# Patient Record
Sex: MALE | Race: WHITE | NOT HISPANIC OR LATINO | Employment: OTHER | URBAN - METROPOLITAN AREA
[De-identification: names, ages, dates, MRNs, and addresses within clinical notes are randomized per-mention and may not be internally consistent; named-entity substitution may affect disease eponyms.]

---

## 2019-01-01 ENCOUNTER — HOSPITAL ENCOUNTER (INPATIENT)
Facility: HOSPITAL | Age: 74
LOS: 9 days | DRG: 246 | End: 2019-12-14
Attending: EMERGENCY MEDICINE | Admitting: INTERNAL MEDICINE
Payer: MEDICARE

## 2019-01-01 VITALS
TEMPERATURE: 97 F | BODY MASS INDEX: 24.91 KG/M2 | WEIGHT: 149.5 LBS | DIASTOLIC BLOOD PRESSURE: 45 MMHG | HEIGHT: 65 IN | SYSTOLIC BLOOD PRESSURE: 71 MMHG

## 2019-01-01 DIAGNOSIS — E87.5 HYPERKALEMIA: ICD-10-CM

## 2019-01-01 DIAGNOSIS — E87.20 METABOLIC ACIDOSIS: ICD-10-CM

## 2019-01-01 DIAGNOSIS — I46.9 CARDIAC ARREST: ICD-10-CM

## 2019-01-01 DIAGNOSIS — K72.01 ACUTE LIVER FAILURE WITH HEPATIC COMA: ICD-10-CM

## 2019-01-01 DIAGNOSIS — R00.0 SINUS TACHYCARDIA: ICD-10-CM

## 2019-01-01 DIAGNOSIS — J96.01 ACUTE HYPOXEMIC RESPIRATORY FAILURE: ICD-10-CM

## 2019-01-01 DIAGNOSIS — I49.9 CARDIAC RHYTHM DISORDER OR DISTURBANCE OR CHANGE: ICD-10-CM

## 2019-01-01 DIAGNOSIS — R94.31 ABNORMAL EKG: ICD-10-CM

## 2019-01-01 DIAGNOSIS — E16.2 HYPOGLYCEMIA: ICD-10-CM

## 2019-01-01 DIAGNOSIS — K76.82 HEPATIC ENCEPHALOPATHY: ICD-10-CM

## 2019-01-01 DIAGNOSIS — I21.3 STEMI (ST ELEVATION MYOCARDIAL INFARCTION): ICD-10-CM

## 2019-01-01 DIAGNOSIS — G47.33 OSA (OBSTRUCTIVE SLEEP APNEA): Chronic | ICD-10-CM

## 2019-01-01 DIAGNOSIS — J90 PLEURAL EFFUSION, BILATERAL: ICD-10-CM

## 2019-01-01 DIAGNOSIS — K55.059 AMI (ACUTE MESENTERIC ISCHEMIA): ICD-10-CM

## 2019-01-01 DIAGNOSIS — R57.0 CARDIOGENIC SHOCK: ICD-10-CM

## 2019-01-01 DIAGNOSIS — I48.3 TYPICAL ATRIAL FLUTTER: ICD-10-CM

## 2019-01-01 DIAGNOSIS — I50.21 ACUTE SYSTOLIC CONGESTIVE HEART FAILURE, NYHA CLASS 3: ICD-10-CM

## 2019-01-01 DIAGNOSIS — I21.02 ST ELEVATION MYOCARDIAL INFARCTION INVOLVING LEFT ANTERIOR DESCENDING (LAD) CORONARY ARTERY: ICD-10-CM

## 2019-01-01 DIAGNOSIS — I21.9 MI (MYOCARDIAL INFARCTION): ICD-10-CM

## 2019-01-01 DIAGNOSIS — I13.0 CARDIORENAL SYNDROME, STAGE 1-4 OR UNSPECIFIED CHRONIC KIDNEY DISEASE, WITH HEART FAILURE: ICD-10-CM

## 2019-01-01 DIAGNOSIS — N17.9 AKI (ACUTE KIDNEY INJURY): ICD-10-CM

## 2019-01-01 DIAGNOSIS — E78.2 MIXED HYPERLIPIDEMIA: ICD-10-CM

## 2019-01-01 DIAGNOSIS — R07.9 CHEST PAIN: ICD-10-CM

## 2019-01-01 LAB
ABO + RH BLD: NORMAL
ALBUMIN SERPL BCP-MCNC: 2.5 G/DL (ref 3.5–5.2)
ALBUMIN SERPL BCP-MCNC: 2.5 G/DL (ref 3.5–5.2)
ALBUMIN SERPL BCP-MCNC: 2.7 G/DL (ref 3.5–5.2)
ALBUMIN SERPL BCP-MCNC: 2.7 G/DL (ref 3.5–5.2)
ALBUMIN SERPL BCP-MCNC: 2.9 G/DL (ref 3.5–5.2)
ALBUMIN SERPL BCP-MCNC: 3.3 G/DL (ref 3.5–5.2)
ALBUMIN SERPL BCP-MCNC: 3.4 G/DL (ref 3.5–5.2)
ALBUMIN SERPL BCP-MCNC: 3.6 G/DL (ref 3.5–5.2)
ALBUMIN SERPL BCP-MCNC: 3.9 G/DL (ref 3.5–5.2)
ALBUMIN SERPL BCP-MCNC: 3.9 G/DL (ref 3.5–5.2)
ALLENS TEST: ABNORMAL
ALLENS TEST: ABNORMAL
ALP SERPL-CCNC: 71 U/L (ref 55–135)
ALP SERPL-CCNC: 80 U/L (ref 55–135)
ALP SERPL-CCNC: 81 U/L (ref 55–135)
ALP SERPL-CCNC: 82 U/L (ref 55–135)
ALP SERPL-CCNC: 89 U/L (ref 55–135)
ALP SERPL-CCNC: 90 U/L (ref 55–135)
ALP SERPL-CCNC: 90 U/L (ref 55–135)
ALP SERPL-CCNC: 95 U/L (ref 55–135)
ALP SERPL-CCNC: 97 U/L (ref 55–135)
ALP SERPL-CCNC: 98 U/L (ref 55–135)
ALT SERPL W/O P-5'-P-CCNC: 1047 U/L (ref 10–44)
ALT SERPL W/O P-5'-P-CCNC: 1059 U/L (ref 10–44)
ALT SERPL W/O P-5'-P-CCNC: 107 U/L (ref 10–44)
ALT SERPL W/O P-5'-P-CCNC: 107 U/L (ref 10–44)
ALT SERPL W/O P-5'-P-CCNC: 27 U/L (ref 10–44)
ALT SERPL W/O P-5'-P-CCNC: 38 U/L (ref 10–44)
ALT SERPL W/O P-5'-P-CCNC: 42 U/L (ref 10–44)
ALT SERPL W/O P-5'-P-CCNC: 52 U/L (ref 10–44)
ALT SERPL W/O P-5'-P-CCNC: 67 U/L (ref 10–44)
ALT SERPL W/O P-5'-P-CCNC: 76 U/L (ref 10–44)
AMMONIA PLAS-SCNC: 98 UMOL/L (ref 10–50)
ANION GAP SERPL CALC-SCNC: 10 MMOL/L (ref 8–16)
ANION GAP SERPL CALC-SCNC: 11 MMOL/L (ref 8–16)
ANION GAP SERPL CALC-SCNC: 12 MMOL/L (ref 8–16)
ANION GAP SERPL CALC-SCNC: 12 MMOL/L (ref 8–16)
ANION GAP SERPL CALC-SCNC: 13 MMOL/L (ref 8–16)
ANION GAP SERPL CALC-SCNC: 14 MMOL/L (ref 8–16)
ANION GAP SERPL CALC-SCNC: 15 MMOL/L (ref 8–16)
ANION GAP SERPL CALC-SCNC: 21 MMOL/L (ref 8–16)
ANION GAP SERPL CALC-SCNC: 23 MMOL/L (ref 8–16)
APTT BLDCRRT: 29.3 SEC (ref 21–32)
APTT BLDCRRT: 34.4 SEC (ref 21–32)
APTT BLDCRRT: 37.6 SEC (ref 21–32)
APTT BLDCRRT: 45.9 SEC (ref 21–32)
APTT BLDCRRT: 48.1 SEC (ref 21–32)
APTT BLDCRRT: 50.3 SEC (ref 21–32)
APTT BLDCRRT: 67.8 SEC (ref 21–32)
AST SERPL-CCNC: 1281 U/L (ref 10–40)
AST SERPL-CCNC: 1323 U/L (ref 10–40)
AST SERPL-CCNC: 159 U/L (ref 10–40)
AST SERPL-CCNC: 29 U/L (ref 10–40)
AST SERPL-CCNC: 332 U/L (ref 10–40)
AST SERPL-CCNC: 42 U/L (ref 10–40)
AST SERPL-CCNC: 470 U/L (ref 10–40)
AST SERPL-CCNC: 51 U/L (ref 10–40)
AST SERPL-CCNC: 52 U/L (ref 10–40)
AST SERPL-CCNC: 78 U/L (ref 10–40)
BACTERIA BLD CULT: NORMAL
BACTERIA BLD CULT: NORMAL
BASOPHILS # BLD AUTO: 0.02 K/UL (ref 0–0.2)
BASOPHILS # BLD AUTO: 0.02 K/UL (ref 0–0.2)
BASOPHILS # BLD AUTO: 0.03 K/UL (ref 0–0.2)
BASOPHILS # BLD AUTO: 0.04 K/UL (ref 0–0.2)
BASOPHILS NFR BLD: 0 % (ref 0–1.9)
BASOPHILS NFR BLD: 0.2 % (ref 0–1.9)
BASOPHILS NFR BLD: 0.3 % (ref 0–1.9)
BASOPHILS NFR BLD: 0.3 % (ref 0–1.9)
BASOPHILS NFR BLD: 0.5 % (ref 0–1.9)
BILIRUB SERPL-MCNC: 0.7 MG/DL (ref 0.1–1)
BILIRUB SERPL-MCNC: 0.8 MG/DL (ref 0.1–1)
BILIRUB SERPL-MCNC: 0.9 MG/DL (ref 0.1–1)
BILIRUB SERPL-MCNC: 1 MG/DL (ref 0.1–1)
BILIRUB SERPL-MCNC: 1.2 MG/DL (ref 0.1–1)
BILIRUB SERPL-MCNC: 1.4 MG/DL (ref 0.1–1)
BILIRUB SERPL-MCNC: 1.5 MG/DL (ref 0.1–1)
BILIRUB SERPL-MCNC: 1.6 MG/DL (ref 0.1–1)
BILIRUB SERPL-MCNC: 1.7 MG/DL (ref 0.1–1)
BILIRUB SERPL-MCNC: 1.9 MG/DL (ref 0.1–1)
BILIRUB UR QL STRIP: NEGATIVE
BILIRUB UR QL STRIP: NEGATIVE
BLD GP AB SCN CELLS X3 SERPL QL: NORMAL
BNP SERPL-MCNC: 1193 PG/ML (ref 0–99)
BNP SERPL-MCNC: 1396 PG/ML (ref 0–99)
BNP SERPL-MCNC: 15 PG/ML (ref 0–99)
BNP SERPL-MCNC: 917 PG/ML (ref 0–99)
BUN SERPL-MCNC: 104 MG/DL (ref 8–23)
BUN SERPL-MCNC: 110 MG/DL (ref 8–23)
BUN SERPL-MCNC: 17 MG/DL (ref 8–23)
BUN SERPL-MCNC: 20 MG/DL (ref 8–23)
BUN SERPL-MCNC: 21 MG/DL (ref 8–23)
BUN SERPL-MCNC: 30 MG/DL (ref 8–23)
BUN SERPL-MCNC: 41 MG/DL (ref 8–23)
BUN SERPL-MCNC: 42 MG/DL (ref 8–23)
BUN SERPL-MCNC: 45 MG/DL (ref 8–23)
BUN SERPL-MCNC: 48 MG/DL (ref 8–23)
BUN SERPL-MCNC: 49 MG/DL (ref 8–23)
BUN SERPL-MCNC: 50 MG/DL (ref 8–23)
BUN SERPL-MCNC: 53 MG/DL (ref 8–23)
BUN SERPL-MCNC: 66 MG/DL (ref 8–23)
CALCIUM SERPL-MCNC: 8.2 MG/DL (ref 8.7–10.5)
CALCIUM SERPL-MCNC: 8.4 MG/DL (ref 8.7–10.5)
CALCIUM SERPL-MCNC: 8.7 MG/DL (ref 8.7–10.5)
CALCIUM SERPL-MCNC: 8.8 MG/DL (ref 8.7–10.5)
CALCIUM SERPL-MCNC: 8.9 MG/DL (ref 8.7–10.5)
CALCIUM SERPL-MCNC: 8.9 MG/DL (ref 8.7–10.5)
CALCIUM SERPL-MCNC: 9 MG/DL (ref 8.7–10.5)
CALCIUM SERPL-MCNC: 9.1 MG/DL (ref 8.7–10.5)
CALCIUM SERPL-MCNC: 9.4 MG/DL (ref 8.7–10.5)
CALCIUM SERPL-MCNC: 9.7 MG/DL (ref 8.7–10.5)
CALCIUM SERPL-MCNC: 9.8 MG/DL (ref 8.7–10.5)
CALCIUM SERPL-MCNC: 9.9 MG/DL (ref 8.7–10.5)
CHLORIDE SERPL-SCNC: 105 MMOL/L (ref 95–110)
CHLORIDE SERPL-SCNC: 105 MMOL/L (ref 95–110)
CHLORIDE SERPL-SCNC: 107 MMOL/L (ref 95–110)
CHLORIDE SERPL-SCNC: 109 MMOL/L (ref 95–110)
CHLORIDE SERPL-SCNC: 91 MMOL/L (ref 95–110)
CHLORIDE SERPL-SCNC: 92 MMOL/L (ref 95–110)
CHLORIDE SERPL-SCNC: 92 MMOL/L (ref 95–110)
CHLORIDE SERPL-SCNC: 94 MMOL/L (ref 95–110)
CHLORIDE SERPL-SCNC: 95 MMOL/L (ref 95–110)
CHLORIDE SERPL-SCNC: 96 MMOL/L (ref 95–110)
CHLORIDE SERPL-SCNC: 96 MMOL/L (ref 95–110)
CHLORIDE SERPL-SCNC: 98 MMOL/L (ref 95–110)
CHOLEST SERPL-MCNC: 137 MG/DL (ref 120–199)
CHOLEST/HDLC SERPL: 3.8 {RATIO} (ref 2–5)
CK MB SERPL-MCNC: 1.3 NG/ML (ref 0.1–6.5)
CK MB SERPL-RTO: 2.3 % (ref 0–5)
CK SERPL-CCNC: 180 U/L (ref 20–200)
CK SERPL-CCNC: 57 U/L (ref 20–200)
CLARITY UR: CLEAR
CLARITY UR: CLEAR
CO2 SERPL-SCNC: 17 MMOL/L (ref 23–29)
CO2 SERPL-SCNC: 20 MMOL/L (ref 23–29)
CO2 SERPL-SCNC: 20 MMOL/L (ref 23–29)
CO2 SERPL-SCNC: 21 MMOL/L (ref 23–29)
CO2 SERPL-SCNC: 22 MMOL/L (ref 23–29)
CO2 SERPL-SCNC: 25 MMOL/L (ref 23–29)
CO2 SERPL-SCNC: 25 MMOL/L (ref 23–29)
CO2 SERPL-SCNC: 27 MMOL/L (ref 23–29)
CO2 SERPL-SCNC: 28 MMOL/L (ref 23–29)
CO2 SERPL-SCNC: 28 MMOL/L (ref 23–29)
CO2 SERPL-SCNC: 29 MMOL/L (ref 23–29)
CO2 SERPL-SCNC: 29 MMOL/L (ref 23–29)
CO2 SERPL-SCNC: 30 MMOL/L (ref 23–29)
CO2 SERPL-SCNC: 32 MMOL/L (ref 23–29)
COLOR UR: YELLOW
COLOR UR: YELLOW
CORONARY STENOSIS: ABNORMAL
CORONARY STENT: YES
CREAT SERPL-MCNC: 1.3 MG/DL (ref 0.5–1.4)
CREAT SERPL-MCNC: 1.3 MG/DL (ref 0.5–1.4)
CREAT SERPL-MCNC: 1.4 MG/DL (ref 0.5–1.4)
CREAT SERPL-MCNC: 1.5 MG/DL (ref 0.5–1.4)
CREAT SERPL-MCNC: 1.5 MG/DL (ref 0.5–1.4)
CREAT SERPL-MCNC: 1.6 MG/DL (ref 0.5–1.4)
CREAT SERPL-MCNC: 1.6 MG/DL (ref 0.5–1.4)
CREAT SERPL-MCNC: 1.7 MG/DL (ref 0.5–1.4)
CREAT SERPL-MCNC: 1.7 MG/DL (ref 0.5–1.4)
CREAT SERPL-MCNC: 1.8 MG/DL (ref 0.5–1.4)
CREAT SERPL-MCNC: 3.6 MG/DL (ref 0.5–1.4)
CREAT SERPL-MCNC: 3.9 MG/DL (ref 0.5–1.4)
DELSYS: ABNORMAL
DELSYS: ABNORMAL
DIASTOLIC DYSFUNCTION: YES
DIASTOLIC DYSFUNCTION: YES
DIFFERENTIAL METHOD: ABNORMAL
EOSINOPHIL # BLD AUTO: 0 K/UL (ref 0–0.5)
EOSINOPHIL # BLD AUTO: 0.1 K/UL (ref 0–0.5)
EOSINOPHIL # BLD AUTO: 0.2 K/UL (ref 0–0.5)
EOSINOPHIL NFR BLD: 0 % (ref 0–8)
EOSINOPHIL NFR BLD: 0.1 % (ref 0–8)
EOSINOPHIL NFR BLD: 0.2 % (ref 0–8)
EOSINOPHIL NFR BLD: 0.2 % (ref 0–8)
EOSINOPHIL NFR BLD: 0.3 % (ref 0–8)
EOSINOPHIL NFR BLD: 0.6 % (ref 0–8)
EOSINOPHIL NFR BLD: 2.4 % (ref 0–8)
EP: 8
EP: 8
ERYTHROCYTE [DISTWIDTH] IN BLOOD BY AUTOMATED COUNT: 12.5 % (ref 11.5–14.5)
ERYTHROCYTE [DISTWIDTH] IN BLOOD BY AUTOMATED COUNT: 12.6 % (ref 11.5–14.5)
ERYTHROCYTE [DISTWIDTH] IN BLOOD BY AUTOMATED COUNT: 12.7 % (ref 11.5–14.5)
ERYTHROCYTE [DISTWIDTH] IN BLOOD BY AUTOMATED COUNT: 12.7 % (ref 11.5–14.5)
ERYTHROCYTE [DISTWIDTH] IN BLOOD BY AUTOMATED COUNT: 12.9 % (ref 11.5–14.5)
ERYTHROCYTE [DISTWIDTH] IN BLOOD BY AUTOMATED COUNT: 13.1 % (ref 11.5–14.5)
ERYTHROCYTE [DISTWIDTH] IN BLOOD BY AUTOMATED COUNT: 13.2 % (ref 11.5–14.5)
ERYTHROCYTE [DISTWIDTH] IN BLOOD BY AUTOMATED COUNT: 13.2 % (ref 11.5–14.5)
ERYTHROCYTE [SEDIMENTATION RATE] IN BLOOD BY WESTERGREN METHOD: 16 MM/H
ERYTHROCYTE [SEDIMENTATION RATE] IN BLOOD BY WESTERGREN METHOD: 16 MM/H
EST. GFR  (AFRICAN AMERICAN): 17 ML/MIN/1.73 M^2
EST. GFR  (AFRICAN AMERICAN): 18 ML/MIN/1.73 M^2
EST. GFR  (AFRICAN AMERICAN): 42 ML/MIN/1.73 M^2
EST. GFR  (AFRICAN AMERICAN): 45 ML/MIN/1.73 M^2
EST. GFR  (AFRICAN AMERICAN): 45 ML/MIN/1.73 M^2
EST. GFR  (AFRICAN AMERICAN): 48.7 ML/MIN/1.73 M^2
EST. GFR  (AFRICAN AMERICAN): 49 ML/MIN/1.73 M^2
EST. GFR  (AFRICAN AMERICAN): 53 ML/MIN/1.73 M^2
EST. GFR  (AFRICAN AMERICAN): 53 ML/MIN/1.73 M^2
EST. GFR  (AFRICAN AMERICAN): 57 ML/MIN/1.73 M^2
EST. GFR  (AFRICAN AMERICAN): >60 ML/MIN/1.73 M^2
EST. GFR  (AFRICAN AMERICAN): >60 ML/MIN/1.73 M^2
EST. GFR  (NON AFRICAN AMERICAN): 14 ML/MIN/1.73 M^2
EST. GFR  (NON AFRICAN AMERICAN): 16 ML/MIN/1.73 M^2
EST. GFR  (NON AFRICAN AMERICAN): 37 ML/MIN/1.73 M^2
EST. GFR  (NON AFRICAN AMERICAN): 39 ML/MIN/1.73 M^2
EST. GFR  (NON AFRICAN AMERICAN): 39 ML/MIN/1.73 M^2
EST. GFR  (NON AFRICAN AMERICAN): 42 ML/MIN/1.73 M^2
EST. GFR  (NON AFRICAN AMERICAN): 42.1 ML/MIN/1.73 M^2
EST. GFR  (NON AFRICAN AMERICAN): 46 ML/MIN/1.73 M^2
EST. GFR  (NON AFRICAN AMERICAN): 46 ML/MIN/1.73 M^2
EST. GFR  (NON AFRICAN AMERICAN): 49 ML/MIN/1.73 M^2
EST. GFR  (NON AFRICAN AMERICAN): 54 ML/MIN/1.73 M^2
EST. GFR  (NON AFRICAN AMERICAN): 54 ML/MIN/1.73 M^2
ESTIMATED AVG GLUCOSE: 103 MG/DL (ref 68–131)
ESTIMATED PA SYSTOLIC PRESSURE: 15.25
ESTIMATED PA SYSTOLIC PRESSURE: 43.05
FIO2: 50
FIO2: 75
GLOBAL PERICARDIAL EFFUSION: ABNORMAL
GLUCOSE SERPL-MCNC: 104 MG/DL (ref 70–110)
GLUCOSE SERPL-MCNC: 109 MG/DL (ref 70–110)
GLUCOSE SERPL-MCNC: 121 MG/DL (ref 70–110)
GLUCOSE SERPL-MCNC: 122 MG/DL (ref 70–110)
GLUCOSE SERPL-MCNC: 122 MG/DL (ref 70–110)
GLUCOSE SERPL-MCNC: 123 MG/DL (ref 70–110)
GLUCOSE SERPL-MCNC: 135 MG/DL (ref 70–110)
GLUCOSE SERPL-MCNC: 139 MG/DL (ref 70–110)
GLUCOSE SERPL-MCNC: 142 MG/DL (ref 70–110)
GLUCOSE SERPL-MCNC: 148 MG/DL (ref 70–110)
GLUCOSE SERPL-MCNC: 170 MG/DL (ref 70–110)
GLUCOSE SERPL-MCNC: 201 MG/DL (ref 70–110)
GLUCOSE SERPL-MCNC: 66 MG/DL (ref 70–110)
GLUCOSE SERPL-MCNC: 73 MG/DL (ref 70–110)
GLUCOSE SERPL-MCNC: 77 MG/DL (ref 70–110)
GLUCOSE SERPL-MCNC: 97 MG/DL (ref 70–110)
GLUCOSE UR QL STRIP: NEGATIVE
GLUCOSE UR QL STRIP: NEGATIVE
HAV IGM SERPL QL IA: NEGATIVE
HBA1C MFR BLD HPLC: 5.2 % (ref 4–5.6)
HBV CORE IGM SERPL QL IA: NEGATIVE
HBV SURFACE AG SERPL QL IA: NEGATIVE
HCO3 UR-SCNC: 11.3 MMOL/L (ref 24–28)
HCO3 UR-SCNC: 15 MMOL/L (ref 24–28)
HCT VFR BLD AUTO: 38.9 % (ref 40–54)
HCT VFR BLD AUTO: 39.1 % (ref 40–54)
HCT VFR BLD AUTO: 41.8 % (ref 40–54)
HCT VFR BLD AUTO: 43.4 % (ref 40–54)
HCT VFR BLD AUTO: 44.6 % (ref 40–54)
HCT VFR BLD AUTO: 45.5 % (ref 40–54)
HCT VFR BLD AUTO: 47.5 % (ref 40–54)
HCT VFR BLD AUTO: 48.4 % (ref 40–54)
HCT VFR BLD AUTO: 49.8 % (ref 40–54)
HCT VFR BLD AUTO: 50 % (ref 40–54)
HCT VFR BLD CALC: 19 %PCV (ref 36–54)
HCT VFR BLD CALC: 33 %PCV (ref 36–54)
HCV AB SERPL QL IA: NEGATIVE
HDLC SERPL-MCNC: 36 MG/DL (ref 40–75)
HDLC SERPL: 26.3 % (ref 20–50)
HGB BLD-MCNC: 12.7 G/DL (ref 14–18)
HGB BLD-MCNC: 12.8 G/DL (ref 14–18)
HGB BLD-MCNC: 13.8 G/DL (ref 14–18)
HGB BLD-MCNC: 14.4 G/DL (ref 14–18)
HGB BLD-MCNC: 14.5 G/DL (ref 14–18)
HGB BLD-MCNC: 14.7 G/DL (ref 14–18)
HGB BLD-MCNC: 15.9 G/DL (ref 14–18)
HGB BLD-MCNC: 16 G/DL (ref 14–18)
HGB BLD-MCNC: 16.1 G/DL (ref 14–18)
HGB BLD-MCNC: 16.4 G/DL (ref 14–18)
HGB UR QL STRIP: ABNORMAL
HGB UR QL STRIP: ABNORMAL
HYALINE CASTS #/AREA URNS LPF: 4 /LPF
IMM GRANULOCYTES # BLD AUTO: 0.02 K/UL (ref 0–0.04)
IMM GRANULOCYTES # BLD AUTO: 0.05 K/UL (ref 0–0.04)
IMM GRANULOCYTES # BLD AUTO: 0.05 K/UL (ref 0–0.04)
IMM GRANULOCYTES # BLD AUTO: 0.06 K/UL (ref 0–0.04)
IMM GRANULOCYTES # BLD AUTO: 0.07 K/UL (ref 0–0.04)
IMM GRANULOCYTES # BLD AUTO: 0.18 K/UL (ref 0–0.04)
IMM GRANULOCYTES # BLD AUTO: ABNORMAL K/UL (ref 0–0.04)
IMM GRANULOCYTES NFR BLD AUTO: 0.2 % (ref 0–0.5)
IMM GRANULOCYTES NFR BLD AUTO: 0.4 % (ref 0–0.5)
IMM GRANULOCYTES NFR BLD AUTO: 0.5 % (ref 0–0.5)
IMM GRANULOCYTES NFR BLD AUTO: 0.6 % (ref 0–0.5)
IMM GRANULOCYTES NFR BLD AUTO: 0.6 % (ref 0–0.5)
IMM GRANULOCYTES NFR BLD AUTO: 1 % (ref 0–0.5)
IMM GRANULOCYTES NFR BLD AUTO: ABNORMAL % (ref 0–0.5)
INR PPP: 1 (ref 0.8–1.2)
INR PPP: 1.9 (ref 0.8–1.2)
IP: 14
IP: 14
KETONES UR QL STRIP: NEGATIVE
KETONES UR QL STRIP: NEGATIVE
LACTATE SERPL-SCNC: 10 MMOL/L (ref 0.5–2.2)
LDLC SERPL CALC-MCNC: 62.2 MG/DL (ref 63–159)
LEUKOCYTE ESTERASE UR QL STRIP: NEGATIVE
LEUKOCYTE ESTERASE UR QL STRIP: NEGATIVE
LYMPHOCYTES # BLD AUTO: 0.7 K/UL (ref 1–4.8)
LYMPHOCYTES # BLD AUTO: 0.8 K/UL (ref 1–4.8)
LYMPHOCYTES # BLD AUTO: 0.9 K/UL (ref 1–4.8)
LYMPHOCYTES # BLD AUTO: 1 K/UL (ref 1–4.8)
LYMPHOCYTES # BLD AUTO: 1.1 K/UL (ref 1–4.8)
LYMPHOCYTES # BLD AUTO: 2.8 K/UL (ref 1–4.8)
LYMPHOCYTES NFR BLD: 3.9 % (ref 18–48)
LYMPHOCYTES NFR BLD: 33.2 % (ref 18–48)
LYMPHOCYTES NFR BLD: 4 % (ref 18–48)
LYMPHOCYTES NFR BLD: 7.1 % (ref 18–48)
LYMPHOCYTES NFR BLD: 7.3 % (ref 18–48)
LYMPHOCYTES NFR BLD: 8.6 % (ref 18–48)
LYMPHOCYTES NFR BLD: 9.5 % (ref 18–48)
MAGNESIUM SERPL-MCNC: 1.9 MG/DL (ref 1.6–2.6)
MAGNESIUM SERPL-MCNC: 2.6 MG/DL (ref 1.6–2.6)
MAGNESIUM SERPL-MCNC: 2.7 MG/DL (ref 1.6–2.6)
MAGNESIUM SERPL-MCNC: 2.8 MG/DL (ref 1.6–2.6)
MAGNESIUM SERPL-MCNC: 2.8 MG/DL (ref 1.6–2.6)
MAGNESIUM SERPL-MCNC: 2.9 MG/DL (ref 1.6–2.6)
MCH RBC QN AUTO: 31.2 PG (ref 27–31)
MCH RBC QN AUTO: 31.2 PG (ref 27–31)
MCH RBC QN AUTO: 31.5 PG (ref 27–31)
MCH RBC QN AUTO: 31.6 PG (ref 27–31)
MCH RBC QN AUTO: 31.6 PG (ref 27–31)
MCH RBC QN AUTO: 31.7 PG (ref 27–31)
MCH RBC QN AUTO: 31.7 PG (ref 27–31)
MCH RBC QN AUTO: 31.8 PG (ref 27–31)
MCH RBC QN AUTO: 31.8 PG (ref 27–31)
MCH RBC QN AUTO: 31.9 PG (ref 27–31)
MCHC RBC AUTO-ENTMCNC: 32.2 G/DL (ref 32–36)
MCHC RBC AUTO-ENTMCNC: 32.3 G/DL (ref 32–36)
MCHC RBC AUTO-ENTMCNC: 32.5 G/DL (ref 32–36)
MCHC RBC AUTO-ENTMCNC: 32.6 G/DL (ref 32–36)
MCHC RBC AUTO-ENTMCNC: 32.7 G/DL (ref 32–36)
MCHC RBC AUTO-ENTMCNC: 32.9 G/DL (ref 32–36)
MCHC RBC AUTO-ENTMCNC: 33 G/DL (ref 32–36)
MCHC RBC AUTO-ENTMCNC: 33.1 G/DL (ref 32–36)
MCHC RBC AUTO-ENTMCNC: 33.2 G/DL (ref 32–36)
MCHC RBC AUTO-ENTMCNC: 33.5 G/DL (ref 32–36)
MCV RBC AUTO: 95 FL (ref 82–98)
MCV RBC AUTO: 96 FL (ref 82–98)
MCV RBC AUTO: 96 FL (ref 82–98)
MCV RBC AUTO: 97 FL (ref 82–98)
MCV RBC AUTO: 97 FL (ref 82–98)
MCV RBC AUTO: 98 FL (ref 82–98)
MCV RBC AUTO: 98 FL (ref 82–98)
MICROSCOPIC COMMENT: ABNORMAL
MICROSCOPIC COMMENT: ABNORMAL
MITRAL VALVE REGURGITATION: ABNORMAL
MODE: ABNORMAL
MODE: ABNORMAL
MONOCYTES # BLD AUTO: 0.5 K/UL (ref 0.3–1)
MONOCYTES # BLD AUTO: 0.9 K/UL (ref 0.3–1)
MONOCYTES # BLD AUTO: 1.3 K/UL (ref 0.3–1)
MONOCYTES # BLD AUTO: 1.4 K/UL (ref 0.3–1)
MONOCYTES # BLD AUTO: 1.6 K/UL (ref 0.3–1)
MONOCYTES # BLD AUTO: 1.7 K/UL (ref 0.3–1)
MONOCYTES NFR BLD: 10 % (ref 4–15)
MONOCYTES NFR BLD: 10.3 % (ref 4–15)
MONOCYTES NFR BLD: 12.3 % (ref 4–15)
MONOCYTES NFR BLD: 13.1 % (ref 4–15)
MONOCYTES NFR BLD: 13.3 % (ref 4–15)
MONOCYTES NFR BLD: 3.9 % (ref 4–15)
MONOCYTES NFR BLD: 6 % (ref 4–15)
NEUTROPHILS # BLD AUTO: 10.9 K/UL (ref 1.8–7.7)
NEUTROPHILS # BLD AUTO: 14.7 K/UL (ref 1.8–7.7)
NEUTROPHILS # BLD AUTO: 4.5 K/UL (ref 1.8–7.7)
NEUTROPHILS # BLD AUTO: 7.9 K/UL (ref 1.8–7.7)
NEUTROPHILS # BLD AUTO: 8.7 K/UL (ref 1.8–7.7)
NEUTROPHILS # BLD AUTO: 9.6 K/UL (ref 1.8–7.7)
NEUTROPHILS NFR BLD: 53.4 % (ref 38–73)
NEUTROPHILS NFR BLD: 61 % (ref 38–73)
NEUTROPHILS NFR BLD: 76.2 % (ref 38–73)
NEUTROPHILS NFR BLD: 77.5 % (ref 38–73)
NEUTROPHILS NFR BLD: 79.7 % (ref 38–73)
NEUTROPHILS NFR BLD: 84.3 % (ref 38–73)
NEUTROPHILS NFR BLD: 87.6 % (ref 38–73)
NEUTS BAND NFR BLD MANUAL: 29 %
NITRITE UR QL STRIP: NEGATIVE
NITRITE UR QL STRIP: NEGATIVE
NONHDLC SERPL-MCNC: 101 MG/DL
NRBC BLD-RTO: 0 /100 WBC
NRBC BLD-RTO: 1 /100 WBC
PCO2 BLDA: 23 MMHG (ref 35–45)
PCO2 BLDA: 23.8 MMHG (ref 35–45)
PH SMN: 7.29 [PH] (ref 7.35–7.45)
PH SMN: 7.42 [PH] (ref 7.35–7.45)
PH UR STRIP: 5 [PH] (ref 5–8)
PH UR STRIP: 5 [PH] (ref 5–8)
PHOSPHATE SERPL-MCNC: 3.3 MG/DL (ref 2.7–4.5)
PHOSPHATE SERPL-MCNC: 3.4 MG/DL (ref 2.7–4.5)
PHOSPHATE SERPL-MCNC: 4.7 MG/DL (ref 2.7–4.5)
PHOSPHATE SERPL-MCNC: 5.8 MG/DL (ref 2.7–4.5)
PLATELET # BLD AUTO: 230 K/UL (ref 150–350)
PLATELET # BLD AUTO: 232 K/UL (ref 150–350)
PLATELET # BLD AUTO: 252 K/UL (ref 150–350)
PLATELET # BLD AUTO: 262 K/UL (ref 150–350)
PLATELET # BLD AUTO: 287 K/UL (ref 150–350)
PLATELET # BLD AUTO: 287 K/UL (ref 150–350)
PLATELET # BLD AUTO: 292 K/UL (ref 150–350)
PLATELET # BLD AUTO: 297 K/UL (ref 150–350)
PLATELET # BLD AUTO: 311 K/UL (ref 150–350)
PLATELET # BLD AUTO: 329 K/UL (ref 150–350)
PLATELET BLD QL SMEAR: ABNORMAL
PLATELET BLD QL SMEAR: ABNORMAL
PMV BLD AUTO: 10 FL (ref 9.2–12.9)
PMV BLD AUTO: 10.4 FL (ref 9.2–12.9)
PMV BLD AUTO: 10.5 FL (ref 9.2–12.9)
PMV BLD AUTO: 10.7 FL (ref 9.2–12.9)
PMV BLD AUTO: 10.9 FL (ref 9.2–12.9)
PMV BLD AUTO: 11 FL (ref 9.2–12.9)
PMV BLD AUTO: 11.2 FL (ref 9.2–12.9)
PMV BLD AUTO: 11.4 FL (ref 9.2–12.9)
PMV BLD AUTO: 9.7 FL (ref 9.2–12.9)
PMV BLD AUTO: 9.8 FL (ref 9.2–12.9)
PO2 BLDA: 50 MMHG (ref 80–100)
PO2 BLDA: 60 MMHG (ref 80–100)
POC ACTIVATED CLOTTING TIME K: 142 SEC (ref 74–137)
POC ACTIVATED CLOTTING TIME K: 169 SEC (ref 74–137)
POC BE: -15 MMOL/L
POC BE: -9 MMOL/L
POC IONIZED CALCIUM: 0.93 MMOL/L (ref 1.06–1.42)
POC IONIZED CALCIUM: 1 MMOL/L (ref 1.06–1.42)
POC SATURATED O2: 81 % (ref 95–100)
POC SATURATED O2: 92 % (ref 95–100)
POCT GLUCOSE: 104 MG/DL (ref 70–110)
POCT GLUCOSE: 108 MG/DL (ref 70–110)
POCT GLUCOSE: 112 MG/DL (ref 70–110)
POCT GLUCOSE: 113 MG/DL (ref 70–110)
POCT GLUCOSE: 114 MG/DL (ref 70–110)
POCT GLUCOSE: 115 MG/DL (ref 70–110)
POCT GLUCOSE: 117 MG/DL (ref 70–110)
POCT GLUCOSE: 121 MG/DL (ref 70–110)
POCT GLUCOSE: 123 MG/DL (ref 70–110)
POCT GLUCOSE: 127 MG/DL (ref 70–110)
POCT GLUCOSE: 128 MG/DL (ref 70–110)
POCT GLUCOSE: 128 MG/DL (ref 70–110)
POCT GLUCOSE: 132 MG/DL (ref 70–110)
POCT GLUCOSE: 134 MG/DL (ref 70–110)
POCT GLUCOSE: 137 MG/DL (ref 70–110)
POCT GLUCOSE: 143 MG/DL (ref 70–110)
POCT GLUCOSE: 144 MG/DL (ref 70–110)
POCT GLUCOSE: 147 MG/DL (ref 70–110)
POCT GLUCOSE: 149 MG/DL (ref 70–110)
POCT GLUCOSE: 155 MG/DL (ref 70–110)
POCT GLUCOSE: 158 MG/DL (ref 70–110)
POCT GLUCOSE: 170 MG/DL (ref 70–110)
POCT GLUCOSE: 175 MG/DL (ref 70–110)
POCT GLUCOSE: 227 MG/DL (ref 70–110)
POCT GLUCOSE: 27 MG/DL (ref 70–110)
POCT GLUCOSE: 34 MG/DL (ref 70–110)
POCT GLUCOSE: 40 MG/DL (ref 70–110)
POCT GLUCOSE: 57 MG/DL (ref 70–110)
POCT GLUCOSE: 67 MG/DL (ref 70–110)
POCT GLUCOSE: 72 MG/DL (ref 70–110)
POCT GLUCOSE: 89 MG/DL (ref 70–110)
POCT GLUCOSE: 95 MG/DL (ref 70–110)
POCT GLUCOSE: 95 MG/DL (ref 70–110)
POLYCHROMASIA BLD QL SMEAR: ABNORMAL
POTASSIUM BLD-SCNC: 3.4 MMOL/L (ref 3.5–5.1)
POTASSIUM BLD-SCNC: 5.6 MMOL/L (ref 3.5–5.1)
POTASSIUM SERPL-SCNC: 3.3 MMOL/L (ref 3.5–5.1)
POTASSIUM SERPL-SCNC: 3.8 MMOL/L (ref 3.5–5.1)
POTASSIUM SERPL-SCNC: 3.8 MMOL/L (ref 3.5–5.1)
POTASSIUM SERPL-SCNC: 3.9 MMOL/L (ref 3.5–5.1)
POTASSIUM SERPL-SCNC: 4.3 MMOL/L (ref 3.5–5.1)
POTASSIUM SERPL-SCNC: 4.3 MMOL/L (ref 3.5–5.1)
POTASSIUM SERPL-SCNC: 4.6 MMOL/L (ref 3.5–5.1)
POTASSIUM SERPL-SCNC: 4.6 MMOL/L (ref 3.5–5.1)
POTASSIUM SERPL-SCNC: 4.7 MMOL/L (ref 3.5–5.1)
POTASSIUM SERPL-SCNC: 4.8 MMOL/L (ref 3.5–5.1)
POTASSIUM SERPL-SCNC: 5.6 MMOL/L (ref 3.5–5.1)
POTASSIUM SERPL-SCNC: 5.6 MMOL/L (ref 3.5–5.1)
POTASSIUM SERPL-SCNC: 6.1 MMOL/L (ref 3.5–5.1)
PROCALCITONIN SERPL IA-MCNC: 0.39 NG/ML
PROT SERPL-MCNC: 5.5 G/DL (ref 6–8.4)
PROT SERPL-MCNC: 5.6 G/DL (ref 6–8.4)
PROT SERPL-MCNC: 6.5 G/DL (ref 6–8.4)
PROT SERPL-MCNC: 6.7 G/DL (ref 6–8.4)
PROT SERPL-MCNC: 6.8 G/DL (ref 6–8.4)
PROT SERPL-MCNC: 7.1 G/DL (ref 6–8.4)
PROT SERPL-MCNC: 7.2 G/DL (ref 6–8.4)
PROT SERPL-MCNC: 7.5 G/DL (ref 6–8.4)
PROT UR QL STRIP: NEGATIVE
PROT UR QL STRIP: NEGATIVE
PROTHROMBIN TIME: 10.4 SEC (ref 9–12.5)
PROTHROMBIN TIME: 10.7 SEC (ref 9–12.5)
PROTHROMBIN TIME: 11 SEC (ref 9–12.5)
PROTHROMBIN TIME: 20.3 SEC (ref 9–12.5)
RBC # BLD AUTO: 4 M/UL (ref 4.6–6.2)
RBC # BLD AUTO: 4.1 M/UL (ref 4.6–6.2)
RBC # BLD AUTO: 4.36 M/UL (ref 4.6–6.2)
RBC # BLD AUTO: 4.56 M/UL (ref 4.6–6.2)
RBC # BLD AUTO: 4.64 M/UL (ref 4.6–6.2)
RBC # BLD AUTO: 4.65 M/UL (ref 4.6–6.2)
RBC # BLD AUTO: 4.98 M/UL (ref 4.6–6.2)
RBC # BLD AUTO: 5.08 M/UL (ref 4.6–6.2)
RBC # BLD AUTO: 5.1 M/UL (ref 4.6–6.2)
RBC # BLD AUTO: 5.16 M/UL (ref 4.6–6.2)
RBC #/AREA URNS HPF: 2 /HPF (ref 0–4)
RBC #/AREA URNS HPF: 30 /HPF (ref 0–4)
RETIRED EF AND QEF - SEE NOTES: 15 (ref 55–65)
RETIRED EF AND QEF - SEE NOTES: 15 (ref 55–65)
RETIRED EF AND QEF - SEE NOTES: 25 (ref 55–65)
SAMPLE: ABNORMAL
SITE: ABNORMAL
SITE: ABNORMAL
SODIUM BLD-SCNC: 121 MMOL/L (ref 136–145)
SODIUM BLD-SCNC: 138 MMOL/L (ref 136–145)
SODIUM SERPL-SCNC: 131 MMOL/L (ref 136–145)
SODIUM SERPL-SCNC: 131 MMOL/L (ref 136–145)
SODIUM SERPL-SCNC: 133 MMOL/L (ref 136–145)
SODIUM SERPL-SCNC: 134 MMOL/L (ref 136–145)
SODIUM SERPL-SCNC: 135 MMOL/L (ref 136–145)
SODIUM SERPL-SCNC: 135 MMOL/L (ref 136–145)
SODIUM SERPL-SCNC: 136 MMOL/L (ref 136–145)
SODIUM SERPL-SCNC: 138 MMOL/L (ref 136–145)
SODIUM SERPL-SCNC: 139 MMOL/L (ref 136–145)
SODIUM SERPL-SCNC: 139 MMOL/L (ref 136–145)
SODIUM SERPL-SCNC: 142 MMOL/L (ref 136–145)
SODIUM SERPL-SCNC: 144 MMOL/L (ref 136–145)
SP GR UR STRIP: 1.02 (ref 1–1.03)
SP GR UR STRIP: >=1.03 (ref 1–1.03)
TRIGL SERPL-MCNC: 194 MG/DL (ref 30–150)
TROPONIN I SERPL DL<=0.01 NG/ML-MCNC: 10.31 NG/ML (ref 0–0.03)
TROPONIN I SERPL DL<=0.01 NG/ML-MCNC: 12.84 NG/ML (ref 0–0.03)
TROPONIN I SERPL DL<=0.01 NG/ML-MCNC: 2.67 NG/ML (ref 0–0.03)
TROPONIN I SERPL DL<=0.01 NG/ML-MCNC: 38.67 NG/ML (ref 0–0.03)
TROPONIN I SERPL DL<=0.01 NG/ML-MCNC: <0.006 NG/ML (ref 0–0.03)
TROPONIN I SERPL DL<=0.01 NG/ML-MCNC: >50 NG/ML (ref 0–0.03)
URN SPEC COLLECT METH UR: ABNORMAL
URN SPEC COLLECT METH UR: ABNORMAL
UROBILINOGEN UR STRIP-ACNC: NEGATIVE EU/DL
UROBILINOGEN UR STRIP-ACNC: NEGATIVE EU/DL
WBC # BLD AUTO: 10.3 K/UL (ref 3.9–12.7)
WBC # BLD AUTO: 10.33 K/UL (ref 3.9–12.7)
WBC # BLD AUTO: 10.92 K/UL (ref 3.9–12.7)
WBC # BLD AUTO: 12.36 K/UL (ref 3.9–12.7)
WBC # BLD AUTO: 12.38 K/UL (ref 3.9–12.7)
WBC # BLD AUTO: 14.32 K/UL (ref 3.9–12.7)
WBC # BLD AUTO: 15.08 K/UL (ref 3.9–12.7)
WBC # BLD AUTO: 15.3 K/UL (ref 3.9–12.7)
WBC # BLD AUTO: 17.38 K/UL (ref 3.9–12.7)
WBC # BLD AUTO: 8.37 K/UL (ref 3.9–12.7)
WBC #/AREA URNS HPF: 5 /HPF (ref 0–5)

## 2019-01-01 PROCEDURE — 83735 ASSAY OF MAGNESIUM: CPT

## 2019-01-01 PROCEDURE — 92941 PRQ TRLML REVSC TOT OCCL AMI: CPT | Mod: ,,, | Performed by: INTERNAL MEDICINE

## 2019-01-01 PROCEDURE — 93005 ELECTROCARDIOGRAM TRACING: CPT

## 2019-01-01 PROCEDURE — 85730 THROMBOPLASTIN TIME PARTIAL: CPT | Mod: 91

## 2019-01-01 PROCEDURE — 36415 COLL VENOUS BLD VENIPUNCTURE: CPT

## 2019-01-01 PROCEDURE — 25000003 PHARM REV CODE 250: Performed by: NURSE PRACTITIONER

## 2019-01-01 PROCEDURE — 63600175 PHARM REV CODE 636 W HCPCS: Mod: ER | Performed by: EMERGENCY MEDICINE

## 2019-01-01 PROCEDURE — 80048 BASIC METABOLIC PNL TOTAL CA: CPT

## 2019-01-01 PROCEDURE — 99291 CRITICAL CARE FIRST HOUR: CPT | Mod: ,,, | Performed by: NURSE PRACTITIONER

## 2019-01-01 PROCEDURE — 99233 PR SUBSEQUENT HOSPITAL CARE,LEVL III: ICD-10-PCS | Mod: ,,, | Performed by: NURSE PRACTITIONER

## 2019-01-01 PROCEDURE — 80053 COMPREHEN METABOLIC PANEL: CPT

## 2019-01-01 PROCEDURE — 94660 CPAP INITIATION&MGMT: CPT

## 2019-01-01 PROCEDURE — 25000003 PHARM REV CODE 250: Performed by: INTERNAL MEDICINE

## 2019-01-01 PROCEDURE — 20000000 HC ICU ROOM

## 2019-01-01 PROCEDURE — 85610 PROTHROMBIN TIME: CPT

## 2019-01-01 PROCEDURE — 93306 TTE W/DOPPLER COMPLETE: CPT | Mod: 26,,, | Performed by: INTERNAL MEDICINE

## 2019-01-01 PROCEDURE — 99292 CRITICAL CARE ADDL 30 MIN: CPT | Mod: 25,,, | Performed by: NURSE PRACTITIONER

## 2019-01-01 PROCEDURE — 82140 ASSAY OF AMMONIA: CPT

## 2019-01-01 PROCEDURE — 11000001 HC ACUTE MED/SURG PRIVATE ROOM

## 2019-01-01 PROCEDURE — 99233 PR SUBSEQUENT HOSPITAL CARE,LEVL III: ICD-10-PCS | Mod: ,,, | Performed by: INTERNAL MEDICINE

## 2019-01-01 PROCEDURE — 99291 PR CRITICAL CARE, E/M 30-74 MINUTES: ICD-10-PCS | Mod: 25,,, | Performed by: NURSE PRACTITIONER

## 2019-01-01 PROCEDURE — 36600 WITHDRAWAL OF ARTERIAL BLOOD: CPT

## 2019-01-01 PROCEDURE — 85027 COMPLETE CBC AUTOMATED: CPT

## 2019-01-01 PROCEDURE — 84100 ASSAY OF PHOSPHORUS: CPT

## 2019-01-01 PROCEDURE — 99900035 HC TECH TIME PER 15 MIN (STAT)

## 2019-01-01 PROCEDURE — 63600175 PHARM REV CODE 636 W HCPCS: Performed by: INTERNAL MEDICINE

## 2019-01-01 PROCEDURE — 36569 INSJ PICC 5 YR+ W/O IMAGING: CPT | Mod: ,,, | Performed by: NURSE PRACTITIONER

## 2019-01-01 PROCEDURE — 25000003 PHARM REV CODE 250

## 2019-01-01 PROCEDURE — 25000003 PHARM REV CODE 250: Performed by: EMERGENCY MEDICINE

## 2019-01-01 PROCEDURE — 93010 ELECTROCARDIOGRAM REPORT: CPT | Mod: ,,, | Performed by: INTERNAL MEDICINE

## 2019-01-01 PROCEDURE — 63600175 PHARM REV CODE 636 W HCPCS: Performed by: NURSE PRACTITIONER

## 2019-01-01 PROCEDURE — 99152 CATH LAB PROCEDURE: ICD-10-PCS | Mod: ,,, | Performed by: INTERNAL MEDICINE

## 2019-01-01 PROCEDURE — 99499 UNLISTED E&M SERVICE: CPT | Mod: ,,, | Performed by: INTERNAL MEDICINE

## 2019-01-01 PROCEDURE — 85730 THROMBOPLASTIN TIME PARTIAL: CPT

## 2019-01-01 PROCEDURE — 85014 HEMATOCRIT: CPT

## 2019-01-01 PROCEDURE — 84132 ASSAY OF SERUM POTASSIUM: CPT

## 2019-01-01 PROCEDURE — 84484 ASSAY OF TROPONIN QUANT: CPT | Mod: 91

## 2019-01-01 PROCEDURE — 27000221 HC OXYGEN, UP TO 24 HOURS: Mod: ER

## 2019-01-01 PROCEDURE — 84145 PROCALCITONIN (PCT): CPT

## 2019-01-01 PROCEDURE — C1751 CATH, INF, PER/CENT/MIDLINE: HCPCS

## 2019-01-01 PROCEDURE — 36556 INSERT NON-TUNNEL CV CATH: CPT

## 2019-01-01 PROCEDURE — 99292 PR CRITICAL CARE, ADDL 30 MIN: ICD-10-PCS | Mod: 25,,, | Performed by: NURSE PRACTITIONER

## 2019-01-01 PROCEDURE — 36800 INSERTION OF CANNULA: CPT | Mod: ,,, | Performed by: NURSE PRACTITIONER

## 2019-01-01 PROCEDURE — 84484 ASSAY OF TROPONIN QUANT: CPT

## 2019-01-01 PROCEDURE — 85025 COMPLETE CBC W/AUTO DIFF WBC: CPT

## 2019-01-01 PROCEDURE — 36569 INSJ PICC 5 YR+ W/O IMAGING: CPT

## 2019-01-01 PROCEDURE — 97530 THERAPEUTIC ACTIVITIES: CPT

## 2019-01-01 PROCEDURE — 97116 GAIT TRAINING THERAPY: CPT

## 2019-01-01 PROCEDURE — 81000 URINALYSIS NONAUTO W/SCOPE: CPT

## 2019-01-01 PROCEDURE — 99233 SBSQ HOSP IP/OBS HIGH 50: CPT | Mod: ,,, | Performed by: INTERNAL MEDICINE

## 2019-01-01 PROCEDURE — 99291 PR CRITICAL CARE, E/M 30-74 MINUTES: ICD-10-PCS | Mod: ,,, | Performed by: NURSE PRACTITIONER

## 2019-01-01 PROCEDURE — 85610 PROTHROMBIN TIME: CPT | Mod: 91,ER

## 2019-01-01 PROCEDURE — 99223 1ST HOSP IP/OBS HIGH 75: CPT | Mod: ,,, | Performed by: INTERNAL MEDICINE

## 2019-01-01 PROCEDURE — 21400001 HC TELEMETRY ROOM

## 2019-01-01 PROCEDURE — 82803 BLOOD GASES ANY COMBINATION: CPT

## 2019-01-01 PROCEDURE — 99291 CRITICAL CARE FIRST HOUR: CPT | Mod: 25,,, | Performed by: NURSE PRACTITIONER

## 2019-01-01 PROCEDURE — 27000221 HC OXYGEN, UP TO 24 HOURS

## 2019-01-01 PROCEDURE — 99223 PR INITIAL HOSPITAL CARE,LEVL III: ICD-10-PCS | Mod: ,,, | Performed by: NURSE PRACTITIONER

## 2019-01-01 PROCEDURE — 63600175 PHARM REV CODE 636 W HCPCS

## 2019-01-01 PROCEDURE — 99223 1ST HOSP IP/OBS HIGH 75: CPT | Mod: ,,, | Performed by: NURSE PRACTITIONER

## 2019-01-01 PROCEDURE — 36569 PR INSERT PICC W/O SUB-Q PORT >5Y/O: ICD-10-PCS | Mod: ,,, | Performed by: NURSE PRACTITIONER

## 2019-01-01 PROCEDURE — 93458 CATH LAB PROCEDURE: ICD-10-PCS | Mod: 26,59,, | Performed by: INTERNAL MEDICINE

## 2019-01-01 PROCEDURE — 36620 PR INSERT CATH,ART,PERCUT,SHORTTERM: ICD-10-PCS | Mod: 59,,, | Performed by: NURSE PRACTITIONER

## 2019-01-01 PROCEDURE — 93010 EKG 12-LEAD: ICD-10-PCS | Mod: 77,,, | Performed by: INTERNAL MEDICINE

## 2019-01-01 PROCEDURE — 93010 EKG 12-LEAD: ICD-10-PCS | Mod: 76,,, | Performed by: INTERNAL MEDICINE

## 2019-01-01 PROCEDURE — 82550 ASSAY OF CK (CPK): CPT | Mod: ER

## 2019-01-01 PROCEDURE — 36620 INSERTION CATHETER ARTERY: CPT | Mod: 59,,, | Performed by: NURSE PRACTITIONER

## 2019-01-01 PROCEDURE — 25000003 PHARM REV CODE 250: Performed by: PHYSICIAN ASSISTANT

## 2019-01-01 PROCEDURE — 87040 BLOOD CULTURE FOR BACTERIA: CPT

## 2019-01-01 PROCEDURE — 27100171 HC OXYGEN HIGH FLOW UP TO 24 HOURS

## 2019-01-01 PROCEDURE — 99233 SBSQ HOSP IP/OBS HIGH 50: CPT | Mod: 25,,, | Performed by: INTERNAL MEDICINE

## 2019-01-01 PROCEDURE — 94761 N-INVAS EAR/PLS OXIMETRY MLT: CPT

## 2019-01-01 PROCEDURE — 93010 ELECTROCARDIOGRAM REPORT: CPT | Mod: 77,,, | Performed by: INTERNAL MEDICINE

## 2019-01-01 PROCEDURE — 99291 CRITICAL CARE FIRST HOUR: CPT | Mod: 25

## 2019-01-01 PROCEDURE — 93306 TTE W/DOPPLER COMPLETE: CPT

## 2019-01-01 PROCEDURE — 97802 MEDICAL NUTRITION INDIV IN: CPT

## 2019-01-01 PROCEDURE — 51702 INSERT TEMP BLADDER CATH: CPT

## 2019-01-01 PROCEDURE — 83605 ASSAY OF LACTIC ACID: CPT

## 2019-01-01 PROCEDURE — 93010 ELECTROCARDIOGRAM REPORT: CPT | Mod: 76,,, | Performed by: INTERNAL MEDICINE

## 2019-01-01 PROCEDURE — 36620 INSERTION CATHETER ARTERY: CPT

## 2019-01-01 PROCEDURE — 82550 ASSAY OF CK (CPK): CPT

## 2019-01-01 PROCEDURE — 99291 CRITICAL CARE FIRST HOUR: CPT | Mod: ,,, | Performed by: INTERNAL MEDICINE

## 2019-01-01 PROCEDURE — 93010 EKG 12-LEAD: ICD-10-PCS | Mod: ,,, | Performed by: INTERNAL MEDICINE

## 2019-01-01 PROCEDURE — 80061 LIPID PANEL: CPT

## 2019-01-01 PROCEDURE — 93306 2D ECHO WITH COLOR FLOW DOPPLER: ICD-10-PCS | Mod: 26,,, | Performed by: INTERNAL MEDICINE

## 2019-01-01 PROCEDURE — 97162 PT EVAL MOD COMPLEX 30 MIN: CPT

## 2019-01-01 PROCEDURE — 83880 ASSAY OF NATRIURETIC PEPTIDE: CPT

## 2019-01-01 PROCEDURE — C1887 CATHETER, GUIDING: HCPCS

## 2019-01-01 PROCEDURE — 83036 HEMOGLOBIN GLYCOSYLATED A1C: CPT

## 2019-01-01 PROCEDURE — 96374 THER/PROPH/DIAG INJ IV PUSH: CPT

## 2019-01-01 PROCEDURE — 25000003 PHARM REV CODE 250: Mod: ER | Performed by: EMERGENCY MEDICINE

## 2019-01-01 PROCEDURE — S5010 5% DEXTROSE AND 0.45% SALINE: HCPCS | Performed by: NURSE PRACTITIONER

## 2019-01-01 PROCEDURE — 84295 ASSAY OF SERUM SODIUM: CPT

## 2019-01-01 PROCEDURE — 80053 COMPREHEN METABOLIC PANEL: CPT | Mod: 91,ER

## 2019-01-01 PROCEDURE — 85025 COMPLETE CBC W/AUTO DIFF WBC: CPT | Mod: 91,ER

## 2019-01-01 PROCEDURE — 99223 PR INITIAL HOSPITAL CARE,LEVL III: ICD-10-PCS | Mod: ,,, | Performed by: INTERNAL MEDICINE

## 2019-01-01 PROCEDURE — 63600175 PHARM REV CODE 636 W HCPCS: Performed by: EMERGENCY MEDICINE

## 2019-01-01 PROCEDURE — C9113 INJ PANTOPRAZOLE SODIUM, VIA: HCPCS | Performed by: INTERNAL MEDICINE

## 2019-01-01 PROCEDURE — 99152 MOD SED SAME PHYS/QHP 5/>YRS: CPT | Mod: ,,, | Performed by: INTERNAL MEDICINE

## 2019-01-01 PROCEDURE — 99233 SBSQ HOSP IP/OBS HIGH 50: CPT | Mod: ,,, | Performed by: NURSE PRACTITIONER

## 2019-01-01 PROCEDURE — 82330 ASSAY OF CALCIUM: CPT

## 2019-01-01 PROCEDURE — 93458 L HRT ARTERY/VENTRICLE ANGIO: CPT | Mod: 26,59,, | Performed by: INTERNAL MEDICINE

## 2019-01-01 PROCEDURE — 86850 RBC ANTIBODY SCREEN: CPT

## 2019-01-01 PROCEDURE — 93005 ELECTROCARDIOGRAM TRACING: CPT | Mod: ER

## 2019-01-01 PROCEDURE — 84484 ASSAY OF TROPONIN QUANT: CPT | Mod: 91,ER

## 2019-01-01 PROCEDURE — 92941 CATH LAB PROCEDURE: ICD-10-PCS | Mod: ,,, | Performed by: INTERNAL MEDICINE

## 2019-01-01 PROCEDURE — 96375 TX/PRO/DX INJ NEW DRUG ADDON: CPT

## 2019-01-01 PROCEDURE — 99499 NO LOS: ICD-10-PCS | Mod: ,,, | Performed by: INTERNAL MEDICINE

## 2019-01-01 PROCEDURE — 85007 BL SMEAR W/DIFF WBC COUNT: CPT

## 2019-01-01 PROCEDURE — 85347 COAGULATION TIME ACTIVATED: CPT

## 2019-01-01 PROCEDURE — 99291 PR CRITICAL CARE, E/M 30-74 MINUTES: ICD-10-PCS | Mod: ,,, | Performed by: INTERNAL MEDICINE

## 2019-01-01 PROCEDURE — 80074 ACUTE HEPATITIS PANEL: CPT

## 2019-01-01 PROCEDURE — 99152 MOD SED SAME PHYS/QHP 5/>YRS: CPT

## 2019-01-01 PROCEDURE — 82553 CREATINE MB FRACTION: CPT | Mod: ER

## 2019-01-01 PROCEDURE — 80053 COMPREHEN METABOLIC PANEL: CPT | Mod: 91

## 2019-01-01 PROCEDURE — 63600175 PHARM REV CODE 636 W HCPCS: Mod: JG | Performed by: NURSE PRACTITIONER

## 2019-01-01 PROCEDURE — 27000190 HC CPAP FULL FACE MASK W/VALVE

## 2019-01-01 PROCEDURE — 25500020 PHARM REV CODE 255

## 2019-01-01 PROCEDURE — 99233 PR SUBSEQUENT HOSPITAL CARE,LEVL III: ICD-10-PCS | Mod: 25,,, | Performed by: INTERNAL MEDICINE

## 2019-01-01 PROCEDURE — 36800 PR INSERT CANNULA,VEIN-VEIN: ICD-10-PCS | Mod: ,,, | Performed by: NURSE PRACTITIONER

## 2019-01-01 RX ORDER — MELATONIN 3 MG
CAPSULE ORAL
COMMUNITY

## 2019-01-01 RX ORDER — POTASSIUM CHLORIDE 7.45 MG/ML
10 INJECTION INTRAVENOUS ONCE
Status: COMPLETED | OUTPATIENT
Start: 2019-01-01 | End: 2019-01-01

## 2019-01-01 RX ORDER — NOREPINEPHRINE BITARTRATE/D5W 4MG/250ML
PLASTIC BAG, INJECTION (ML) INTRAVENOUS
Status: COMPLETED
Start: 2019-01-01 | End: 2019-01-01

## 2019-01-01 RX ORDER — FUROSEMIDE 10 MG/ML
40 INJECTION INTRAMUSCULAR; INTRAVENOUS 3 TIMES DAILY
Status: DISCONTINUED | OUTPATIENT
Start: 2019-01-01 | End: 2019-01-01

## 2019-01-01 RX ORDER — FAMOTIDINE 20 MG/1
20 TABLET, FILM COATED ORAL DAILY
Status: DISCONTINUED | OUTPATIENT
Start: 2019-01-01 | End: 2019-01-01

## 2019-01-01 RX ORDER — HEPARIN SODIUM 10000 [USP'U]/100ML
1000 INJECTION, SOLUTION INTRAVENOUS CONTINUOUS
Status: DISCONTINUED | OUTPATIENT
Start: 2019-01-01 | End: 2019-01-01

## 2019-01-01 RX ORDER — RAMIPRIL 1.25 MG/1
2.5 CAPSULE ORAL DAILY
Status: DISCONTINUED | OUTPATIENT
Start: 2019-01-01 | End: 2019-01-01

## 2019-01-01 RX ORDER — ASPIRIN 325 MG
325 TABLET ORAL DAILY
Status: DISCONTINUED | OUTPATIENT
Start: 2019-01-01 | End: 2019-01-01

## 2019-01-01 RX ORDER — CALCIUM CHLORIDE INJECTION 100 MG/ML
1 INJECTION, SOLUTION INTRAVENOUS ONCE
Status: COMPLETED | OUTPATIENT
Start: 2019-01-01 | End: 2019-01-01

## 2019-01-01 RX ORDER — ASPIRIN 81 MG/1
81 TABLET ORAL
COMMUNITY

## 2019-01-01 RX ORDER — HEPARIN SODIUM,PORCINE/D5W 25000/250
12 INTRAVENOUS SOLUTION INTRAVENOUS CONTINUOUS
Status: DISCONTINUED | OUTPATIENT
Start: 2019-01-01 | End: 2019-01-01

## 2019-01-01 RX ORDER — NITROGLYCERIN 20 MG/100ML
5 INJECTION INTRAVENOUS CONTINUOUS
Status: DISCONTINUED | OUTPATIENT
Start: 2019-01-01 | End: 2019-01-01

## 2019-01-01 RX ORDER — RANOLAZINE 500 MG/1
500 TABLET, EXTENDED RELEASE ORAL ONCE
Status: COMPLETED | OUTPATIENT
Start: 2019-01-01 | End: 2019-01-01

## 2019-01-01 RX ORDER — FUROSEMIDE 10 MG/ML
40 INJECTION INTRAMUSCULAR; INTRAVENOUS EVERY 8 HOURS
Status: DISCONTINUED | OUTPATIENT
Start: 2019-01-01 | End: 2019-01-01

## 2019-01-01 RX ORDER — FUROSEMIDE 10 MG/ML
40 INJECTION INTRAMUSCULAR; INTRAVENOUS 2 TIMES DAILY
Status: DISCONTINUED | OUTPATIENT
Start: 2019-01-01 | End: 2019-01-01

## 2019-01-01 RX ORDER — IBUPROFEN 200 MG
24 TABLET ORAL
Status: DISCONTINUED | OUTPATIENT
Start: 2019-01-01 | End: 2019-01-01

## 2019-01-01 RX ORDER — PANTOPRAZOLE SODIUM 40 MG/10ML
40 INJECTION, POWDER, LYOPHILIZED, FOR SOLUTION INTRAVENOUS DAILY
Status: DISCONTINUED | OUTPATIENT
Start: 2019-01-01 | End: 2019-01-01

## 2019-01-01 RX ORDER — HYDROCODONE BITARTRATE AND ACETAMINOPHEN 5; 325 MG/1; MG/1
1 TABLET ORAL EVERY 4 HOURS PRN
Status: DISCONTINUED | OUTPATIENT
Start: 2019-01-01 | End: 2019-01-01

## 2019-01-01 RX ORDER — METOPROLOL TARTRATE 1 MG/ML
2.5 INJECTION, SOLUTION INTRAVENOUS EVERY 6 HOURS
Status: DISCONTINUED | OUTPATIENT
Start: 2019-01-01 | End: 2019-01-01

## 2019-01-01 RX ORDER — FUROSEMIDE 10 MG/ML
40 INJECTION INTRAMUSCULAR; INTRAVENOUS ONCE
Status: COMPLETED | OUTPATIENT
Start: 2019-01-01 | End: 2019-01-01

## 2019-01-01 RX ORDER — IPRATROPIUM BROMIDE AND ALBUTEROL SULFATE 2.5; .5 MG/3ML; MG/3ML
3 SOLUTION RESPIRATORY (INHALATION) EVERY 6 HOURS PRN
Status: DISCONTINUED | OUTPATIENT
Start: 2019-01-01 | End: 2019-01-01

## 2019-01-01 RX ORDER — GLUCAGON 1 MG
1 KIT INJECTION
Status: DISCONTINUED | OUTPATIENT
Start: 2019-01-01 | End: 2019-01-01

## 2019-01-01 RX ORDER — METOPROLOL TARTRATE 1 MG/ML
2.5 INJECTION, SOLUTION INTRAVENOUS ONCE
Status: COMPLETED | OUTPATIENT
Start: 2019-01-01 | End: 2019-01-01

## 2019-01-01 RX ORDER — ONDANSETRON 2 MG/ML
4 INJECTION INTRAMUSCULAR; INTRAVENOUS
Status: COMPLETED | OUTPATIENT
Start: 2019-01-01 | End: 2019-01-01

## 2019-01-01 RX ORDER — CEFEPIME HYDROCHLORIDE 1 G/50ML
1 INJECTION, SOLUTION INTRAVENOUS
Status: DISCONTINUED | OUTPATIENT
Start: 2019-01-01 | End: 2019-01-01

## 2019-01-01 RX ORDER — INSULIN ASPART 100 [IU]/ML
1-10 INJECTION, SOLUTION INTRAVENOUS; SUBCUTANEOUS
Status: DISCONTINUED | OUTPATIENT
Start: 2019-01-01 | End: 2019-01-01

## 2019-01-01 RX ORDER — RAMIPRIL 5 MG/1
5 CAPSULE ORAL
COMMUNITY

## 2019-01-01 RX ORDER — ASPIRIN 81 MG/1
81 TABLET ORAL DAILY
Status: DISCONTINUED | OUTPATIENT
Start: 2019-01-01 | End: 2019-01-01

## 2019-01-01 RX ORDER — OXYCODONE HYDROCHLORIDE 5 MG/1
10 TABLET ORAL EVERY 4 HOURS PRN
Status: DISCONTINUED | OUTPATIENT
Start: 2019-01-01 | End: 2019-01-01

## 2019-01-01 RX ORDER — LANOLIN ALCOHOL/MO/W.PET/CERES
400 CREAM (GRAM) TOPICAL ONCE
Status: COMPLETED | OUTPATIENT
Start: 2019-01-01 | End: 2019-01-01

## 2019-01-01 RX ORDER — ENOXAPARIN SODIUM 100 MG/ML
40 INJECTION SUBCUTANEOUS EVERY 24 HOURS
Status: DISCONTINUED | OUTPATIENT
Start: 2019-01-01 | End: 2019-01-01

## 2019-01-01 RX ORDER — HYOSCYAMINE SULFATE 0.12 MG/1
0.12 TABLET SUBLINGUAL EVERY 4 HOURS PRN
Status: DISCONTINUED | OUTPATIENT
Start: 2019-01-01 | End: 2019-01-01

## 2019-01-01 RX ORDER — RANOLAZINE 500 MG/1
500 TABLET, EXTENDED RELEASE ORAL 2 TIMES DAILY
Status: DISCONTINUED | OUTPATIENT
Start: 2019-01-01 | End: 2019-01-01

## 2019-01-01 RX ORDER — LABETALOL HYDROCHLORIDE 5 MG/ML
10 INJECTION, SOLUTION INTRAVENOUS ONCE
Status: COMPLETED | OUTPATIENT
Start: 2019-01-01 | End: 2019-01-01

## 2019-01-01 RX ORDER — NOREPINEPHRINE BITARTRATE/D5W 4MG/250ML
0.02 PLASTIC BAG, INJECTION (ML) INTRAVENOUS CONTINUOUS
Status: DISCONTINUED | OUTPATIENT
Start: 2019-01-01 | End: 2019-01-01

## 2019-01-01 RX ORDER — POLYETHYLENE GLYCOL 3350 17 G/17G
17 POWDER, FOR SOLUTION ORAL DAILY
Status: DISCONTINUED | OUTPATIENT
Start: 2019-01-01 | End: 2019-01-01

## 2019-01-01 RX ORDER — FUROSEMIDE 10 MG/ML
40 INJECTION INTRAMUSCULAR; INTRAVENOUS DAILY
Status: DISCONTINUED | OUTPATIENT
Start: 2019-01-01 | End: 2019-01-01

## 2019-01-01 RX ORDER — CEFEPIME HYDROCHLORIDE 1 G/50ML
2 INJECTION, SOLUTION INTRAVENOUS
Status: DISCONTINUED | OUTPATIENT
Start: 2019-01-01 | End: 2019-01-01

## 2019-01-01 RX ORDER — OMEGA-3-ACID ETHYL ESTERS 1 G/1
4 CAPSULE, LIQUID FILLED ORAL DAILY
Status: DISCONTINUED | OUTPATIENT
Start: 2019-01-01 | End: 2019-01-01

## 2019-01-01 RX ORDER — POTASSIUM CHLORIDE 20 MEQ/1
40 TABLET, EXTENDED RELEASE ORAL DAILY
Status: DISCONTINUED | OUTPATIENT
Start: 2019-01-01 | End: 2019-01-01

## 2019-01-01 RX ORDER — HEPARIN SODIUM 5000 [USP'U]/ML
3000 INJECTION, SOLUTION INTRAVENOUS; SUBCUTANEOUS
Status: DISCONTINUED | OUTPATIENT
Start: 2019-01-01 | End: 2019-01-01

## 2019-01-01 RX ORDER — NOREPINEPHRINE BITARTRATE/D5W 4MG/250ML
0.02 PLASTIC BAG, INJECTION (ML) INTRAVENOUS CONTINUOUS
Status: DISCONTINUED | OUTPATIENT
Start: 2019-01-01 | End: 2019-01-01 | Stop reason: HOSPADM

## 2019-01-01 RX ORDER — TALC
6 POWDER (GRAM) TOPICAL NIGHTLY PRN
Status: DISCONTINUED | OUTPATIENT
Start: 2019-01-01 | End: 2019-01-01

## 2019-01-01 RX ORDER — DEXTROMETHORPHAN/PSEUDOEPHED 2.5-7.5/.8
40 DROPS ORAL 4 TIMES DAILY PRN
Status: DISCONTINUED | OUTPATIENT
Start: 2019-01-01 | End: 2019-01-01

## 2019-01-01 RX ORDER — ATORVASTATIN CALCIUM 40 MG/1
80 TABLET, FILM COATED ORAL NIGHTLY
Status: DISCONTINUED | OUTPATIENT
Start: 2019-01-01 | End: 2019-01-01

## 2019-01-01 RX ORDER — DOBUTAMINE HYDROCHLORIDE 400 MG/100ML
5 INJECTION INTRAVENOUS CONTINUOUS
Status: DISCONTINUED | OUTPATIENT
Start: 2019-01-01 | End: 2019-01-01

## 2019-01-01 RX ORDER — FUROSEMIDE 40 MG/1
40 TABLET ORAL DAILY
Status: DISCONTINUED | OUTPATIENT
Start: 2019-01-01 | End: 2019-01-01

## 2019-01-01 RX ORDER — MORPHINE SULFATE 4 MG/ML
4 INJECTION, SOLUTION INTRAMUSCULAR; INTRAVENOUS EVERY 4 HOURS PRN
Status: DISCONTINUED | OUTPATIENT
Start: 2019-01-01 | End: 2019-01-01

## 2019-01-01 RX ORDER — ACETAMINOPHEN 325 MG/1
650 TABLET ORAL EVERY 4 HOURS PRN
Status: DISCONTINUED | OUTPATIENT
Start: 2019-01-01 | End: 2019-01-01

## 2019-01-01 RX ORDER — MORPHINE SULFATE 4 MG/ML
2 INJECTION, SOLUTION INTRAMUSCULAR; INTRAVENOUS ONCE
Status: COMPLETED | OUTPATIENT
Start: 2019-01-01 | End: 2019-01-01

## 2019-01-01 RX ORDER — AMIODARONE HYDROCHLORIDE 200 MG/1
200 TABLET ORAL ONCE
Status: COMPLETED | OUTPATIENT
Start: 2019-01-01 | End: 2019-01-01

## 2019-01-01 RX ORDER — POTASSIUM CHLORIDE 20 MEQ/1
20 TABLET, EXTENDED RELEASE ORAL DAILY
Status: DISCONTINUED | OUTPATIENT
Start: 2019-01-01 | End: 2019-01-01

## 2019-01-01 RX ORDER — METOPROLOL TARTRATE 1 MG/ML
5 INJECTION, SOLUTION INTRAVENOUS
Status: DISCONTINUED | OUTPATIENT
Start: 2019-01-01 | End: 2019-01-01

## 2019-01-01 RX ORDER — CALCIUM GLUCONATE 98 MG/ML
INJECTION, SOLUTION INTRAVENOUS
Status: DISCONTINUED
Start: 2019-01-01 | End: 2019-01-01 | Stop reason: WASHOUT

## 2019-01-01 RX ORDER — POTASSIUM CHLORIDE 20 MEQ/1
20 TABLET, EXTENDED RELEASE ORAL ONCE
Status: COMPLETED | OUTPATIENT
Start: 2019-01-01 | End: 2019-01-01

## 2019-01-01 RX ORDER — METOPROLOL SUCCINATE 25 MG/1
12.5 TABLET, EXTENDED RELEASE ORAL
COMMUNITY

## 2019-01-01 RX ORDER — AMOXICILLIN 500 MG
1 CAPSULE ORAL
COMMUNITY

## 2019-01-01 RX ORDER — NITROGLYCERIN 20 MG/100ML
10 INJECTION INTRAVENOUS CONTINUOUS
Status: DISCONTINUED | OUTPATIENT
Start: 2019-01-01 | End: 2019-01-01

## 2019-01-01 RX ORDER — DOCUSATE SODIUM 100 MG/1
100 CAPSULE, LIQUID FILLED ORAL DAILY
Status: DISCONTINUED | OUTPATIENT
Start: 2019-01-01 | End: 2019-01-01

## 2019-01-01 RX ORDER — LEVALBUTEROL INHALATION SOLUTION 0.63 MG/3ML
0.63 SOLUTION RESPIRATORY (INHALATION) EVERY 6 HOURS PRN
Status: DISCONTINUED | OUTPATIENT
Start: 2019-01-01 | End: 2019-01-01

## 2019-01-01 RX ORDER — AMOXICILLIN 500 MG
1 CAPSULE ORAL DAILY
Status: DISCONTINUED | OUTPATIENT
Start: 2019-01-01 | End: 2019-01-01

## 2019-01-01 RX ORDER — RAMIPRIL 5 MG/1
5 CAPSULE ORAL DAILY
Status: DISCONTINUED | OUTPATIENT
Start: 2019-01-01 | End: 2019-01-01

## 2019-01-01 RX ORDER — HEPARIN SODIUM 5000 [USP'U]/ML
5000 INJECTION, SOLUTION INTRAVENOUS; SUBCUTANEOUS ONCE
Status: COMPLETED | OUTPATIENT
Start: 2019-01-01 | End: 2019-01-01

## 2019-01-01 RX ORDER — ISOSORBIDE DINITRATE 10 MG/1
10 TABLET ORAL
COMMUNITY

## 2019-01-01 RX ORDER — DICYCLOMINE HYDROCHLORIDE 10 MG/ML
10 INJECTION INTRAMUSCULAR ONCE
Status: COMPLETED | OUTPATIENT
Start: 2019-01-01 | End: 2019-01-01

## 2019-01-01 RX ORDER — NITROGLYCERIN 0.4 MG/1
0.4 TABLET SUBLINGUAL EVERY 5 MIN PRN
Status: DISCONTINUED | OUTPATIENT
Start: 2019-01-01 | End: 2019-01-01

## 2019-01-01 RX ORDER — INDOMETHACIN 25 MG/1
50 CAPSULE ORAL ONCE
Status: COMPLETED | OUTPATIENT
Start: 2019-01-01 | End: 2019-01-01

## 2019-01-01 RX ORDER — AMIODARONE HYDROCHLORIDE 200 MG/1
200 TABLET ORAL 2 TIMES DAILY
Status: DISCONTINUED | OUTPATIENT
Start: 2019-01-01 | End: 2019-01-01

## 2019-01-01 RX ORDER — PRAVASTATIN SODIUM 20 MG/1
20 TABLET ORAL
COMMUNITY

## 2019-01-01 RX ORDER — MORPHINE SULFATE 4 MG/ML
4 INJECTION, SOLUTION INTRAMUSCULAR; INTRAVENOUS EVERY 30 MIN PRN
Status: DISCONTINUED | OUTPATIENT
Start: 2019-01-01 | End: 2019-01-01 | Stop reason: HOSPADM

## 2019-01-01 RX ORDER — CLOPIDOGREL BISULFATE 75 MG/1
75 TABLET ORAL DAILY
Status: DISCONTINUED | OUTPATIENT
Start: 2019-01-01 | End: 2019-01-01

## 2019-01-01 RX ORDER — IBUPROFEN 200 MG
16 TABLET ORAL
Status: DISCONTINUED | OUTPATIENT
Start: 2019-01-01 | End: 2019-01-01

## 2019-01-01 RX ORDER — ESCITALOPRAM OXALATE 10 MG/1
10 TABLET ORAL
COMMUNITY

## 2019-01-01 RX ORDER — INDOMETHACIN 25 MG/1
100 CAPSULE ORAL ONCE
Status: COMPLETED | OUTPATIENT
Start: 2019-01-01 | End: 2019-01-01

## 2019-01-01 RX ORDER — SODIUM CHLORIDE 9 MG/ML
INJECTION, SOLUTION INTRAVENOUS CONTINUOUS
Status: ACTIVE | OUTPATIENT
Start: 2019-01-01 | End: 2019-01-01

## 2019-01-01 RX ORDER — ESCITALOPRAM OXALATE 10 MG/1
10 TABLET ORAL DAILY
Status: DISCONTINUED | OUTPATIENT
Start: 2019-01-01 | End: 2019-01-01

## 2019-01-01 RX ORDER — INDOMETHACIN 25 MG/1
CAPSULE ORAL
Status: COMPLETED
Start: 2019-01-01 | End: 2019-01-01

## 2019-01-01 RX ORDER — POTASSIUM CHLORIDE 20 MEQ/15ML
20 SOLUTION ORAL DAILY
Status: DISCONTINUED | OUTPATIENT
Start: 2019-01-01 | End: 2019-01-01

## 2019-01-01 RX ORDER — PRAVASTATIN SODIUM 20 MG/1
20 TABLET ORAL NIGHTLY
Status: DISCONTINUED | OUTPATIENT
Start: 2019-01-01 | End: 2019-01-01

## 2019-01-01 RX ORDER — MORPHINE SULFATE 4 MG/ML
4 INJECTION, SOLUTION INTRAMUSCULAR; INTRAVENOUS
Status: COMPLETED | OUTPATIENT
Start: 2019-01-01 | End: 2019-01-01

## 2019-01-01 RX ORDER — NITROGLYCERIN 0.4 MG/1
0.4 TABLET SUBLINGUAL
Status: COMPLETED | OUTPATIENT
Start: 2019-01-01 | End: 2019-01-01

## 2019-01-01 RX ORDER — DEXTROSE MONOHYDRATE 100 MG/ML
INJECTION, SOLUTION INTRAVENOUS CONTINUOUS
Status: DISCONTINUED | OUTPATIENT
Start: 2019-01-01 | End: 2019-01-01

## 2019-01-01 RX ORDER — ONDANSETRON 2 MG/ML
4 INJECTION INTRAMUSCULAR; INTRAVENOUS EVERY 6 HOURS PRN
Status: DISCONTINUED | OUTPATIENT
Start: 2019-01-01 | End: 2019-01-01 | Stop reason: HOSPADM

## 2019-01-01 RX ORDER — ATROPINE SULFATE 0.1 MG/ML
0.5 INJECTION INTRAVENOUS
Status: DISCONTINUED | OUTPATIENT
Start: 2019-01-01 | End: 2019-01-01

## 2019-01-01 RX ADMIN — INDOMETHACIN 50 MEQ: 25 CAPSULE ORAL at 08:12

## 2019-01-01 RX ADMIN — HYOSCYAMINE SULFATE 0.12 MG: 0.12 TABLET, ORALLY DISINTEGRATING ORAL at 11:12

## 2019-01-01 RX ADMIN — NITROGLYCERIN 10 MCG/MIN: 20 INJECTION INTRAVENOUS at 09:12

## 2019-01-01 RX ADMIN — MINERAL OIL AND WHITE PETROLATUM: 150; 830 OINTMENT OPHTHALMIC at 08:12

## 2019-01-01 RX ADMIN — FAMOTIDINE 20 MG: 20 TABLET ORAL at 10:12

## 2019-01-01 RX ADMIN — SODIUM BICARBONATE 100 MEQ: 84 INJECTION, SOLUTION INTRAVENOUS at 09:12

## 2019-01-01 RX ADMIN — METOPROLOL SUCCINATE 12.5 MG: 25 TABLET, EXTENDED RELEASE ORAL at 09:12

## 2019-01-01 RX ADMIN — MINERAL OIL AND WHITE PETROLATUM: 150; 830 OINTMENT OPHTHALMIC at 10:12

## 2019-01-01 RX ADMIN — OMEGA-3-ACID ETHYL ESTERS 4 G: 900 CAPSULE, LIQUID FILLED ORAL at 09:12

## 2019-01-01 RX ADMIN — NITROGLYCERIN 0.4 MG: 0.4 TABLET, ORALLY DISINTEGRATING SUBLINGUAL at 08:12

## 2019-01-01 RX ADMIN — SODIUM CHLORIDE: 0.9 INJECTION, SOLUTION INTRAVENOUS at 11:12

## 2019-01-01 RX ADMIN — FUROSEMIDE 40 MG: 10 INJECTION, SOLUTION INTRAMUSCULAR; INTRAVENOUS at 06:12

## 2019-01-01 RX ADMIN — DICYCLOMINE HYDROCHLORIDE 10 MG: 20 INJECTION INTRAMUSCULAR at 11:12

## 2019-01-01 RX ADMIN — CLOPIDOGREL BISULFATE 75 MG: 75 TABLET ORAL at 08:12

## 2019-01-01 RX ADMIN — RANOLAZINE 500 MG: 500 TABLET, FILM COATED, EXTENDED RELEASE ORAL at 09:12

## 2019-01-01 RX ADMIN — INSULIN ASPART 4 UNITS: 100 INJECTION, SOLUTION INTRAVENOUS; SUBCUTANEOUS at 07:12

## 2019-01-01 RX ADMIN — ONDANSETRON 4 MG: 2 INJECTION INTRAMUSCULAR; INTRAVENOUS at 07:12

## 2019-01-01 RX ADMIN — POLYETHYLENE GLYCOL (3350) 17 G: 17 POWDER, FOR SOLUTION ORAL at 09:12

## 2019-01-01 RX ADMIN — MORPHINE SULFATE 4 MG: 4 INJECTION, SOLUTION INTRAMUSCULAR; INTRAVENOUS at 11:12

## 2019-01-01 RX ADMIN — CLOPIDOGREL BISULFATE 75 MG: 75 TABLET ORAL at 10:12

## 2019-01-01 RX ADMIN — MORPHINE SULFATE 4 MG: 4 INJECTION, SOLUTION INTRAMUSCULAR; INTRAVENOUS at 10:12

## 2019-01-01 RX ADMIN — FUROSEMIDE 40 MG: 10 INJECTION, SOLUTION INTRAMUSCULAR; INTRAVENOUS at 03:12

## 2019-01-01 RX ADMIN — FUROSEMIDE 40 MG: 10 INJECTION, SOLUTION INTRAMUSCULAR; INTRAVENOUS at 02:12

## 2019-01-01 RX ADMIN — AMIODARONE HYDROCHLORIDE 0.5 MG/MIN: 1.8 INJECTION, SOLUTION INTRAVENOUS at 11:12

## 2019-01-01 RX ADMIN — ALUMINUM HYDROXIDE, MAGNESIUM HYDROXIDE, AND SIMETHICONE: 200; 200; 20 SUSPENSION ORAL at 10:12

## 2019-01-01 RX ADMIN — OMEGA-3-ACID ETHYL ESTERS 4 G: 900 CAPSULE, LIQUID FILLED ORAL at 10:12

## 2019-01-01 RX ADMIN — Medication 0.4 MCG/KG/MIN: at 10:12

## 2019-01-01 RX ADMIN — ESCITALOPRAM OXALATE 10 MG: 10 TABLET ORAL at 08:12

## 2019-01-01 RX ADMIN — ONDANSETRON 4 MG: 2 INJECTION INTRAMUSCULAR; INTRAVENOUS at 04:12

## 2019-01-01 RX ADMIN — SODIUM BICARBONATE: 84 INJECTION, SOLUTION INTRAVENOUS at 08:12

## 2019-01-01 RX ADMIN — AMIODARONE HYDROCHLORIDE 200 MG: 200 TABLET ORAL at 08:12

## 2019-01-01 RX ADMIN — PROMETHAZINE HYDROCHLORIDE 12.5 MG: 25 INJECTION INTRAMUSCULAR; INTRAVENOUS at 08:12

## 2019-01-01 RX ADMIN — APIXABAN 5 MG: 2.5 TABLET, FILM COATED ORAL at 08:12

## 2019-01-01 RX ADMIN — GLUCAGON 1 MG: KIT at 05:12

## 2019-01-01 RX ADMIN — ESCITALOPRAM OXALATE 10 MG: 10 TABLET ORAL at 10:12

## 2019-01-01 RX ADMIN — Medication 400 MG: at 12:12

## 2019-01-01 RX ADMIN — FUROSEMIDE 40 MG: 10 INJECTION, SOLUTION INTRAMUSCULAR; INTRAVENOUS at 08:12

## 2019-01-01 RX ADMIN — METOPROLOL TARTRATE 2.5 MG: 5 INJECTION INTRAVENOUS at 06:12

## 2019-01-01 RX ADMIN — MORPHINE SULFATE 4 MG: 4 INJECTION, SOLUTION INTRAMUSCULAR; INTRAVENOUS at 05:12

## 2019-01-01 RX ADMIN — HEPARIN SODIUM 1000 UNITS/HR: 10000 INJECTION, SOLUTION INTRAVENOUS at 08:12

## 2019-01-01 RX ADMIN — ESCITALOPRAM OXALATE 10 MG: 10 TABLET ORAL at 09:12

## 2019-01-01 RX ADMIN — LABETALOL HYDROCHLORIDE 140 MG: 5 INJECTION, SOLUTION INTRAVENOUS at 04:12

## 2019-01-01 RX ADMIN — ENOXAPARIN SODIUM 40 MG: 100 INJECTION SUBCUTANEOUS at 05:12

## 2019-01-01 RX ADMIN — NITROGLYCERIN 50 MCG/MIN: 20 INJECTION INTRAVENOUS at 10:12

## 2019-01-01 RX ADMIN — AMIODARONE HYDROCHLORIDE 0.5 MG/MIN: 1.8 INJECTION, SOLUTION INTRAVENOUS at 12:12

## 2019-01-01 RX ADMIN — FUROSEMIDE 40 MG: 10 INJECTION, SOLUTION INTRAMUSCULAR; INTRAVENOUS at 09:12

## 2019-01-01 RX ADMIN — ONDANSETRON 4 MG: 2 INJECTION INTRAMUSCULAR; INTRAVENOUS at 08:12

## 2019-01-01 RX ADMIN — POLYETHYLENE GLYCOL (3350) 17 G: 17 POWDER, FOR SOLUTION ORAL at 08:12

## 2019-01-01 RX ADMIN — POTASSIUM CHLORIDE 10 MEQ: 7.46 INJECTION, SOLUTION INTRAVENOUS at 07:12

## 2019-01-01 RX ADMIN — AMIODARONE HYDROCHLORIDE 200 MG: 200 TABLET ORAL at 09:12

## 2019-01-01 RX ADMIN — CLOPIDOGREL BISULFATE 75 MG: 75 TABLET ORAL at 09:12

## 2019-01-01 RX ADMIN — METOPROLOL SUCCINATE 12.5 MG: 25 TABLET, EXTENDED RELEASE ORAL at 12:12

## 2019-01-01 RX ADMIN — ONDANSETRON 4 MG: 2 INJECTION INTRAMUSCULAR; INTRAVENOUS at 09:12

## 2019-01-01 RX ADMIN — LORAZEPAM 2 MG: 2 INJECTION INTRAMUSCULAR; INTRAVENOUS at 09:12

## 2019-01-01 RX ADMIN — HEPARIN SODIUM 16 UNITS/KG/HR: 10000 INJECTION, SOLUTION INTRAVENOUS at 06:12

## 2019-01-01 RX ADMIN — HEPARIN SODIUM 14 UNITS/KG/HR: 10000 INJECTION, SOLUTION INTRAVENOUS at 05:12

## 2019-01-01 RX ADMIN — ATORVASTATIN CALCIUM 80 MG: 40 TABLET, FILM COATED ORAL at 02:12

## 2019-01-01 RX ADMIN — APIXABAN 5 MG: 2.5 TABLET, FILM COATED ORAL at 09:12

## 2019-01-01 RX ADMIN — MORPHINE SULFATE 4 MG: 4 INJECTION, SOLUTION INTRAMUSCULAR; INTRAVENOUS at 12:12

## 2019-01-01 RX ADMIN — LORAZEPAM 0.5 MG: 2 INJECTION INTRAMUSCULAR; INTRAVENOUS at 10:12

## 2019-01-01 RX ADMIN — RANOLAZINE 500 MG: 500 TABLET, FILM COATED, EXTENDED RELEASE ORAL at 08:12

## 2019-01-01 RX ADMIN — METOPROLOL TARTRATE 5 MG: 5 INJECTION INTRAVENOUS at 11:12

## 2019-01-01 RX ADMIN — DEXTROSE 250 ML: 10 SOLUTION INTRAVENOUS at 08:12

## 2019-01-01 RX ADMIN — AMIODARONE HYDROCHLORIDE 200 MG: 200 TABLET ORAL at 01:12

## 2019-01-01 RX ADMIN — CALCIUM CHLORIDE 1 G: 100 INJECTION, SOLUTION INTRAVENOUS at 08:12

## 2019-01-01 RX ADMIN — GLUCAGON 1 MG: KIT at 06:12

## 2019-01-01 RX ADMIN — POTASSIUM CHLORIDE 40 MEQ: 20 TABLET, EXTENDED RELEASE ORAL at 08:12

## 2019-01-01 RX ADMIN — AMIODARONE HYDROCHLORIDE 200 MG: 200 TABLET ORAL at 12:12

## 2019-01-01 RX ADMIN — ASPIRIN 81 MG: 81 TABLET, COATED ORAL at 10:12

## 2019-01-01 RX ADMIN — ATORVASTATIN CALCIUM 80 MG: 40 TABLET, FILM COATED ORAL at 08:12

## 2019-01-01 RX ADMIN — POTASSIUM CHLORIDE 20 MEQ: 1500 TABLET, EXTENDED RELEASE ORAL at 09:12

## 2019-01-01 RX ADMIN — LORAZEPAM 0.5 MG: 2 INJECTION INTRAMUSCULAR; INTRAVENOUS at 03:12

## 2019-01-01 RX ADMIN — MORPHINE SULFATE 4 MG: 4 INJECTION, SOLUTION INTRAMUSCULAR; INTRAVENOUS at 02:12

## 2019-01-01 RX ADMIN — MINERAL OIL AND WHITE PETROLATUM: 150; 830 OINTMENT OPHTHALMIC at 09:12

## 2019-01-01 RX ADMIN — FAMOTIDINE 20 MG: 20 TABLET ORAL at 08:12

## 2019-01-01 RX ADMIN — POTASSIUM CHLORIDE 20 MEQ: 20 TABLET, EXTENDED RELEASE ORAL at 11:12

## 2019-01-01 RX ADMIN — PROMETHAZINE HYDROCHLORIDE 12.5 MG: 25 INJECTION INTRAMUSCULAR; INTRAVENOUS at 05:12

## 2019-01-01 RX ADMIN — FUROSEMIDE 40 MG: 10 INJECTION, SOLUTION INTRAMUSCULAR; INTRAVENOUS at 10:12

## 2019-01-01 RX ADMIN — LORAZEPAM 0.5 MG: 2 INJECTION INTRAMUSCULAR; INTRAVENOUS at 04:12

## 2019-01-01 RX ADMIN — SIMETHICONE 40 MG: 20 SUSPENSION/ DROPS ORAL at 10:12

## 2019-01-01 RX ADMIN — OMEGA-3-ACID ETHYL ESTERS 4 G: 900 CAPSULE, LIQUID FILLED ORAL at 08:12

## 2019-01-01 RX ADMIN — ASPIRIN 81 MG: 81 TABLET, COATED ORAL at 09:12

## 2019-01-01 RX ADMIN — HEPARIN SODIUM 12 UNITS/KG/HR: 10000 INJECTION, SOLUTION INTRAVENOUS at 09:12

## 2019-01-01 RX ADMIN — ENOXAPARIN SODIUM 40 MG: 100 INJECTION SUBCUTANEOUS at 08:12

## 2019-01-01 RX ADMIN — PANTOPRAZOLE SODIUM 40 MG: 40 INJECTION, POWDER, FOR SOLUTION INTRAVENOUS at 10:12

## 2019-01-01 RX ADMIN — HEPARIN SODIUM 5000 UNITS: 5000 INJECTION, SOLUTION INTRAVENOUS; SUBCUTANEOUS at 08:12

## 2019-01-01 RX ADMIN — SODIUM CHLORIDE: 0.9 INJECTION, SOLUTION INTRAVENOUS at 02:12

## 2019-01-01 RX ADMIN — APIXABAN 5 MG: 2.5 TABLET, FILM COATED ORAL at 12:12

## 2019-01-01 RX ADMIN — METOPROLOL TARTRATE 2.5 MG: 5 INJECTION INTRAVENOUS at 07:12

## 2019-01-01 RX ADMIN — RAMIPRIL 2.5 MG: 1.25 CAPSULE ORAL at 10:12

## 2019-01-01 RX ADMIN — MORPHINE SULFATE 4 MG: 4 INJECTION, SOLUTION INTRAMUSCULAR; INTRAVENOUS at 08:12

## 2019-01-01 RX ADMIN — ONDANSETRON 4 MG: 2 INJECTION INTRAMUSCULAR; INTRAVENOUS at 02:12

## 2019-01-01 RX ADMIN — INSULIN ASPART 2 UNITS: 100 INJECTION, SOLUTION INTRAVENOUS; SUBCUTANEOUS at 05:12

## 2019-01-01 RX ADMIN — ENOXAPARIN SODIUM 40 MG: 100 INJECTION SUBCUTANEOUS at 06:12

## 2019-01-01 RX ADMIN — ASPIRIN 325 MG ORAL TABLET 325 MG: 325 PILL ORAL at 09:12

## 2019-01-01 RX ADMIN — LORAZEPAM 2 MG: 2 INJECTION INTRAMUSCULAR; INTRAVENOUS at 12:12

## 2019-01-01 RX ADMIN — MELATONIN 6 MG: at 09:12

## 2019-01-01 RX ADMIN — HEPARIN SODIUM 16 UNITS/KG/HR: 10000 INJECTION, SOLUTION INTRAVENOUS at 08:12

## 2019-01-01 RX ADMIN — SIMETHICONE 40 MG: 20 SUSPENSION/ DROPS ORAL at 09:12

## 2019-01-01 RX ADMIN — ATORVASTATIN CALCIUM 80 MG: 40 TABLET, FILM COATED ORAL at 09:12

## 2019-01-01 RX ADMIN — FAMOTIDINE 20 MG: 20 TABLET ORAL at 09:12

## 2019-01-01 RX ADMIN — RANOLAZINE 500 MG: 500 TABLET, FILM COATED, EXTENDED RELEASE ORAL at 12:12

## 2019-01-01 RX ADMIN — SODIUM BICARBONATE 50 MEQ: 84 INJECTION, SOLUTION INTRAVENOUS at 08:12

## 2019-01-01 RX ADMIN — MORPHINE SULFATE 2 MG: 4 INJECTION INTRAVENOUS at 09:12

## 2019-01-01 RX ADMIN — NITROGLYCERIN 1 INCH: 20 OINTMENT TOPICAL at 08:12

## 2019-01-01 RX ADMIN — DOCUSATE SODIUM 100 MG: 100 CAPSULE, LIQUID FILLED ORAL at 09:12

## 2019-01-01 RX ADMIN — TENECTEPLASE 35 MG: KIT at 08:12

## 2019-01-01 RX ADMIN — METOPROLOL TARTRATE 2.5 MG: 5 INJECTION INTRAVENOUS at 02:12

## 2019-01-01 RX ADMIN — METOPROLOL TARTRATE 2.5 MG: 5 INJECTION INTRAVENOUS at 08:12

## 2019-01-01 RX ADMIN — PROMETHAZINE HYDROCHLORIDE 12.5 MG: 25 INJECTION INTRAMUSCULAR; INTRAVENOUS at 11:12

## 2019-01-01 RX ADMIN — Medication 0.7 MCG/KG/MIN: at 08:12

## 2019-01-01 RX ADMIN — ASPIRIN 81 MG: 81 TABLET, COATED ORAL at 08:12

## 2019-01-01 RX ADMIN — FUROSEMIDE 40 MG: 10 INJECTION, SOLUTION INTRAMUSCULAR; INTRAVENOUS at 05:12

## 2019-01-01 RX ADMIN — AMIODARONE HYDROCHLORIDE 1 MG/MIN: 1.8 INJECTION, SOLUTION INTRAVENOUS at 05:12

## 2019-01-01 RX ADMIN — DEXTROSE: 10 SOLUTION INTRAVENOUS at 07:12

## 2019-01-01 RX ADMIN — METOPROLOL TARTRATE 2.5 MG: 5 INJECTION INTRAVENOUS at 12:12

## 2019-01-01 RX ADMIN — METOPROLOL TARTRATE 2.5 MG: 5 INJECTION INTRAVENOUS at 11:12

## 2019-01-01 RX ADMIN — DOBUTAMINE HYDROCHLORIDE 2.5 MCG/KG/MIN: 400 INJECTION INTRAVENOUS at 08:12

## 2019-01-01 RX ADMIN — POLYETHYLENE GLYCOL (3350) 17 G: 17 POWDER, FOR SOLUTION ORAL at 10:12

## 2019-01-01 RX ADMIN — DOCUSATE SODIUM 100 MG: 100 CAPSULE, LIQUID FILLED ORAL at 10:12

## 2019-12-05 PROBLEM — I21.3 STEMI (ST ELEVATION MYOCARDIAL INFARCTION): Status: ACTIVE | Noted: 2019-01-01

## 2019-12-06 PROBLEM — R11.0 NAUSEA: Status: ACTIVE | Noted: 2019-01-01

## 2019-12-06 PROBLEM — E78.2 MIXED HYPERLIPIDEMIA: Status: ACTIVE | Noted: 2019-01-01

## 2019-12-06 PROBLEM — I47.29 PAROXYSMAL VT: Status: ACTIVE | Noted: 2019-01-01

## 2019-12-06 PROBLEM — G47.33 OSA (OBSTRUCTIVE SLEEP APNEA): Status: ACTIVE | Noted: 2019-01-01

## 2019-12-06 NOTE — ASSESSMENT & PLAN NOTE
STEMI   S/p PCI with JOSE to proximal LAD (See operative Note)  Continue ASA/Statin/BB/Plavix  Topical nitrites;  Supplemental oxygen;   Consider CPAP for desaturations  Pain relief measures: hydrocodone and Prn morphine  Continue as per Cardiology;  Trend troponins

## 2019-12-06 NOTE — HPI
74 year old male with PMH including CAD s/p MI with stent to LAD; HTN; HLD; depression; insomnia; reports YESENIA diagnosis and has CPAP but has not routinely used  Presented to [facility] ED with CP and EKG showed STEMI  tnkase given at 2046 and he was urgently transferred here for evaluation by cardiology  Taken for C after arrival at this facility  PCI of LAD stent with balloon + new LAD stent perfromed

## 2019-12-06 NOTE — PROGRESS NOTES
"Resting well; mild chest pain reported;monitor showing sinus tachycardia;  Noted to drop his sats into the low 80's;  On questioning states he's supposed to be on CPAP at home but rarely uses it due to difficullty with the mask    BP (!) 148/62 (BP Location: Right arm, Patient Position: Lying)   Pulse 96   Temp 97.7 °F (36.5 °C) (Oral)   Resp 12   Ht 5' 5" (1.651 m)   Wt 73.3 kg (161 lb 9.6 oz)   SpO2 (!) 90%   BMI 26.89 kg/m²        No JVD; Lungs clear  COR benign  No pedal edema    ECG  With sinus tach ; LAFb;  Loss of anterior forces;     A/P Stable post PCI with JOSE to proximal LAD  Hypoxemia  OSAS  Plan:  CPAP ordered; respiratory therapy to adjust  Trend troponins;     "

## 2019-12-06 NOTE — SUBJECTIVE & OBJECTIVE
Past Medical History:   Diagnosis Date    Angina pectoris     Depression     Hyperlipidemia     Hypertension     Insomnia     MI (myocardial infarction) 2009    YESENIA on CPAP        Past Surgical History:   Procedure Laterality Date    CERVICAL FUSION      CORONARY ANGIOPLASTY WITH STENT PLACEMENT  2009       Review of patient's allergies indicates:  No Known Allergies    Family History     None        Tobacco Use    Smoking status: Former Smoker    Smokeless tobacco: Never Used   Substance and Sexual Activity    Alcohol use: Never     Frequency: Never    Drug use: Never    Sexual activity: Not on file         Review of Systems   Constitutional: Positive for fatigue. Negative for chills and fever.   HENT: Negative for congestion.    Eyes: Negative.    Respiratory: Negative for shortness of breath.         YESENIA   Cardiovascular: Positive for chest pain. Negative for leg swelling.   Gastrointestinal: Positive for nausea and vomiting.   Musculoskeletal: Positive for arthralgias and back pain.   Neurological: Positive for weakness. Negative for dizziness, tremors and speech difficulty.   Hematological: Bruises/bleeds easily.   Psychiatric/Behavioral: The patient is not nervous/anxious.      Objective:     Vital Signs (Most Recent):  Temp: 97.6 °F (36.4 °C) (12/06/19 1115)  Pulse: 90 (12/06/19 1115)  Resp: 17 (12/06/19 1115)  BP: 101/77 (12/06/19 1115)  SpO2: (!) 90 % (12/06/19 1115) Vital Signs (24h Range):  Temp:  [97.5 °F (36.4 °C)-98.6 °F (37 °C)] 97.6 °F (36.4 °C)  Pulse:  [] 90  Resp:  [11-26] 17  SpO2:  [88 %-97 %] 90 %  BP: ()/() 101/77     Weight: 73.3 kg (161 lb 9.6 oz)  Body mass index is 26.89 kg/m².      Intake/Output Summary (Last 24 hours) at 12/6/2019 1215  Last data filed at 12/6/2019 0948  Gross per 24 hour   Intake 785.42 ml   Output 275 ml   Net 510.42 ml       Physical Exam   Constitutional: He is oriented to person, place, and time. No distress.   HENT:   Head:  Atraumatic.   Eyes: Pupils are equal, round, and reactive to light. Conjunctivae are normal.   Neck: No tracheal deviation present.   Cardiovascular: Normal rate and regular rhythm.   No murmur heard.  Pulses:       Radial pulses are 2+ on the right side, and 2+ on the left side.        Dorsalis pedis pulses are 2+ on the right side, and 2+ on the left side.   Pulmonary/Chest: Effort normal and breath sounds normal. He has no wheezes. He has no rales.   Abdominal: Soft. Bowel sounds are normal.   Musculoskeletal: He exhibits no edema.   Neurological: He is alert and oriented to person, place, and time.   Skin: Skin is warm and dry. Capillary refill takes less than 2 seconds.            Vents:  Oxygen Concentration (%): 50 (12/06/19 0300)    Lines/Drains/Airways     Peripheral Intravenous Line                 Peripheral IV - Single Lumen 12/05/19 2032 18 G Right Antecubital less than 1 day         Peripheral IV - Single Lumen 12/05/19 2035 18 G Left Antecubital less than 1 day         Peripheral IV - Single Lumen 12/05/19 2329 20 G Right Forearm less than 1 day                Significant Labs:    CBC/Anemia Profile:  Recent Labs   Lab 12/05/19  2030 12/05/19 2319 12/06/19  0324   WBC 8.37 12.38 10.33   HGB 15.9 14.4 16.0   HCT 47.5 43.4 48.4    232 287   MCV 95 95 95   RDW 12.7 12.5 12.6        Chemistries:  Recent Labs   Lab 12/05/19 2030 12/05/19 2319 12/06/19  0324    142 138   K 3.9 3.8 4.8    109 105   CO2 25 22* 20*   BUN 17 20 21   CREATININE 1.6* 1.4 1.4   CALCIUM 9.9 8.8 9.0   ALBUMIN 3.9 3.6 3.9   PROT 7.1 6.7 7.5   BILITOT 0.7 0.8 1.0   ALKPHOS 90 81 95   ALT 27 38 107*   AST 29 51* 470*       All pertinent labs within the past 24 hours have been reviewed.    Significant Imaging:   I have reviewed all pertinent imaging results/findings within the past 24 hours.

## 2019-12-06 NOTE — PROVIDER PROGRESS NOTES - EMERGENCY DEPT.
Encounter Date: 12/5/2019    ED Physician Progress Notes       SCRIBE NOTE: Kirti ACUNA, am scribing for, and in the presence of,  Albert Ayon Do, MD.  Physician Statement: Albert ACUNA Do, MD, personally performed the services described in this documentation as scribed by Kirti Gomez in my presence, and it is both accurate and complete.            SCRIBE #1 NOTE: IKirti am scribing for, and in the presence of, Albert Ayon Do, MD. I have scribed the entire note.   SCRIBE #1 NOTE: IKirti, am scribing for, and in the presence of,  Albert Ayon Do, MD. I have scribed the entire note.       History      Chief Complaint   Patient presents with    Chest Pain       Review of patient's allergies indicates:  No Known Allergies     HPI   HPI    12/5/2019, 10:17 PM   History obtained from the [facility] and patient      History of Present Illness: Raleigh Walsh is a 74 y.o. male patient with a PSHx of Angioplasty with stents who presents to the Emergency Department for evaluation of Chest Pain which onset about 2 hours PTA. Patient stated his pain rated 10/10 on initial onset while sitting down watching television at home. [facility] arrival patient rated his pain at 4/10 and given 1 inch nitro paste, heparin, and zofran. Patient currently rates his pain in the ED 2/10 in severity. No mitigating or exacerbating factors reported. No associated sxs. No further complaints or concerns at this time.       Arrival mode: AASI    PCP: Akhil Smith MD       Past Medical History:  Past Medical History:   Diagnosis Date    Hypertension        Past Surgical History:  Past Surgical History:   Procedure Laterality Date    CORONARY ANGIOPLASTY WITH STENT PLACEMENT           Family History:  History reviewed. No pertinent family history.    Social History:  Social History     Tobacco Use    Smoking status: Former Smoker    Smokeless tobacco: Never Used   Substance and Sexual Activity    Alcohol use:  Never     Frequency: Never    Drug use: Never    Sexual activity: Unknown       ROS   Review of Systems   Constitutional: Negative for fever.   HENT: Negative for sore throat.    Respiratory: Negative for shortness of breath.    Cardiovascular: Positive for chest pain.   Genitourinary: Negative for dysuria.   Musculoskeletal: Negative for back pain.   Skin: Negative for rash.   Neurological: Negative for weakness.   Hematological: Does not bruise/bleed easily.   All other systems reviewed and are negative.    Physical Exam      Initial Vitals [12/05/19 2030]   BP Pulse Resp Temp SpO2   (!) 158/96 85 20 98.4 °F (36.9 °C) 95 %      MAP       --          Physical Exam    Nursing Notes and Vital Signs Reviewed.  Constitutional: Patient is in no acute distress. Well-developed and well-nourished.  Head: Atraumatic. Normocephalic.  Eyes: PERRL. EOM intact. Conjunctivae are not pale. No scleral icterus.  ENT: Mucous membranes are moist.    Neck: Supple. Full ROM.  Cardiovascular: Regular rate. Regular rhythm. No murmurs, rubs, or gallops. Distal pulses are 2+ and symmetric.  Pulmonary/Chest: No respiratory distress. Clear to auscultation bilaterally. No wheezing or rales.  Abdominal: Soft and non-distended.  There is no tenderness.  Musculoskeletal: Moves all extremities. No obvious deformities. No edema.  Skin: Warm and dry.  Neurological:  Alert, awake, and appropriate.  Normal speech.  No acute focal neurological deficits are appreciated.  Psychiatric: Normal affect. Good eye contact. Appropriate in content.    ED Discussion    10:06 PM: [facility] picked up pt with 4/10 chest pain. 1 inch of NTG with heparin up to 20 units per hour. EKG shows ST elevation in leads 2-5, 1 in aVL and reciprocal changes in aFV. Dr. Alcazar en route to see patient at this time.    Critical Care  Date/Time: 12/6/2019 11:43 PM  Performed by: Albert Ayon Do, MD  Authorized by: Albert Ayon Do, MD   Direct patient critical care time: 15  minutes  Additional history critical care time: 5 minutes  Ordering / reviewing critical care time: 5 minutes  Documentation critical care time: 5 minutes  Consulting other physicians critical care time: 5 minutes  Total critical care time (exclusive of procedural time) : 35 minutes  Critical care time was exclusive of separately billable procedures and treating other patients and teaching time.  Critical care was necessary to treat or prevent imminent or life-threatening deterioration of the following conditions: STEMI.  Critical care was time spent personally by me on the following activities: blood draw for specimens, development of treatment plan with patient or surrogate, discussions with consultants, interpretation of cardiac output measurements, evaluation of patient's response to treatment, examination of patient, obtaining history from patient or surrogate, ordering and performing treatments and interventions, ordering and review of laboratory studies, pulse oximetry, ordering and review of radiographic studies and re-evaluation of patient's condition.

## 2019-12-06 NOTE — SUBJECTIVE & OBJECTIVE
Past Medical History:   Diagnosis Date    Angina pectoris     Depression     Hyperlipidemia     Hypertension     Insomnia     MI (myocardial infarction) 2009       Past Surgical History:   Procedure Laterality Date    CERVICAL FUSION      CORONARY ANGIOPLASTY WITH STENT PLACEMENT  2009       Review of patient's allergies indicates:  No Known Allergies    No current facility-administered medications on file prior to encounter.      Current Outpatient Medications on File Prior to Encounter   Medication Sig    aspirin (ECOTRIN) 81 MG EC tablet Take 81 mg by mouth.    escitalopram oxalate (LEXAPRO) 10 MG tablet Take 10 mg by mouth.    isosorbide dinitrate (ISORDIL) 10 MG tablet Take 10 mg by mouth.    melatonin 3 mg Cap Take by mouth.    metoprolol succinate (TOPROL-XL) 25 MG 24 hr tablet Take 12.5 mg by mouth.    multivitamin capsule Take 1 capsule by mouth once daily.    omega-3 fatty acids/fish oil (FISH OIL-OMEGA-3 FATTY ACIDS) 300-1,000 mg capsule Take 1 g by mouth.    pravastatin (PRAVACHOL) 20 MG tablet Take 20 mg by mouth.    ramipril (ALTACE) 5 MG capsule Take 5 mg by mouth.     Family History     None        Tobacco Use    Smoking status: Former Smoker    Smokeless tobacco: Never Used   Substance and Sexual Activity    Alcohol use: Never     Frequency: Never    Drug use: Never    Sexual activity: Not on file     Review of Systems   Constitution: Negative. Negative for weight gain.   HENT: Negative.    Eyes: Negative.    Cardiovascular: Negative.  Negative for chest pain, leg swelling and palpitations.   Respiratory: Negative.  Negative for shortness of breath.    Endocrine: Negative.    Hematologic/Lymphatic: Negative.    Skin: Negative.    Musculoskeletal: Negative for muscle weakness.   Gastrointestinal: Negative.    Genitourinary: Negative.    Neurological: Negative.  Negative for dizziness.   Psychiatric/Behavioral: Negative.    Allergic/Immunologic: Negative.      Objective:      Vital Signs (Most Recent):  Temp: 97.7 °F (36.5 °C) (12/06/19 0106)  Pulse: (Abnormal) 118 (12/06/19 0400)  Resp: (Abnormal) 26 (12/06/19 0400)  BP: (Abnormal) 153/120 (12/06/19 0400)  SpO2: 96 % (12/06/19 0400) Vital Signs (24h Range):  Temp:  [97.7 °F (36.5 °C)-98.6 °F (37 °C)] 97.7 °F (36.5 °C)  Pulse:  [] 118  Resp:  [11-26] 26  SpO2:  [88 %-97 %] 96 %  BP: (117-160)/() 153/120     Weight: 73.3 kg (161 lb 9.6 oz)  Body mass index is 26.89 kg/m².    SpO2: 96 %  O2 Device (Oxygen Therapy): nasal cannula      Intake/Output Summary (Last 24 hours) at 12/6/2019 0513  Last data filed at 12/6/2019 0400  Gross per 24 hour   Intake 160.42 ml   Output 25 ml   Net 135.42 ml       Lines/Drains/Airways     Peripheral Intravenous Line     Name Duration         Peripheral IV - Single Lumen 12/05/19 2032 18 G Right Antecubital less than 1 day         Peripheral IV - Single Lumen 12/05/19 2035 18 G Left Antecubital less than 1 day         Peripheral IV - Single Lumen 12/05/19 2329 20 G Right Forearm less than 1 day                Physical Exam   Constitutional: He is oriented to person, place, and time. He appears well-developed and well-nourished.   HENT:   Head: Normocephalic and atraumatic.   Eyes: Pupils are equal, round, and reactive to light. Conjunctivae are normal.   Neck: Normal range of motion. Neck supple.   Cardiovascular: Normal rate, regular rhythm, normal heart sounds and intact distal pulses.   Pulmonary/Chest: Effort normal and breath sounds normal.   Abdominal: Soft. Bowel sounds are normal.   Neurological: He is alert and oriented to person, place, and time.   Skin: Skin is warm and dry.   Psychiatric: He has a normal mood and affect.   Nursing note and vitals reviewed.      Significant Labs: All pertinent lab results from the last 24 hours have been reviewed.    Significant Imaging: X-Ray: CXR: X-Ray Chest 1 View (CXR): No results found for this visit on 12/05/19.

## 2019-12-06 NOTE — SUBJECTIVE & OBJECTIVE
Past Medical History:   Diagnosis Date    Angina pectoris     Depression     Hyperlipidemia     Hypertension     Insomnia     MI (myocardial infarction) 2009       Past Surgical History:   Procedure Laterality Date    CERVICAL FUSION      CORONARY ANGIOPLASTY WITH STENT PLACEMENT  2009       Review of patient's allergies indicates:  No Known Allergies    No current facility-administered medications on file prior to encounter.      Current Outpatient Medications on File Prior to Encounter   Medication Sig    aspirin (ECOTRIN) 81 MG EC tablet Take 81 mg by mouth.    escitalopram oxalate (LEXAPRO) 10 MG tablet Take 10 mg by mouth.    isosorbide dinitrate (ISORDIL) 10 MG tablet Take 10 mg by mouth.    melatonin 3 mg Cap Take by mouth.    metoprolol succinate (TOPROL-XL) 25 MG 24 hr tablet Take 12.5 mg by mouth.    multivitamin capsule Take 1 capsule by mouth once daily.    omega-3 fatty acids/fish oil (FISH OIL-OMEGA-3 FATTY ACIDS) 300-1,000 mg capsule Take 1 g by mouth.    pravastatin (PRAVACHOL) 20 MG tablet Take 20 mg by mouth.    ramipril (ALTACE) 5 MG capsule Take 5 mg by mouth.     Family History     None        Tobacco Use    Smoking status: Former Smoker    Smokeless tobacco: Never Used   Substance and Sexual Activity    Alcohol use: Never     Frequency: Never    Drug use: Never    Sexual activity: Not on file     Review of Systems   Constitutional: Negative for activity change, appetite change, diaphoresis, fatigue and fever.   HENT: Negative.    Respiratory: Positive for chest tightness and shortness of breath. Negative for apnea.    Cardiovascular: Positive for chest pain. Negative for palpitations and leg swelling.   Gastrointestinal: Negative for abdominal distention and nausea.   Genitourinary: Negative for difficulty urinating.   Musculoskeletal: Negative.    Allergic/Immunologic: Negative.    Neurological: Negative for dizziness and syncope.   Hematological: Negative for adenopathy.    Psychiatric/Behavioral: Negative for agitation and behavioral problems.     Objective:     Vital Signs (Most Recent):  Temp: 97.7 °F (36.5 °C) (12/06/19 0106)  Pulse: 96 (12/06/19 0106)  Resp: 12 (12/06/19 0106)  BP: (!) 148/62 (12/06/19 0106)  SpO2: (!) 90 % (12/06/19 0106) Vital Signs (24h Range):  Temp:  [97.7 °F (36.5 °C)-98.6 °F (37 °C)] 97.7 °F (36.5 °C)  Pulse:  [75-97] 96  Resp:  [11-21] 12  SpO2:  [90 %-97 %] 90 %  BP: (117-158)/(62-97) 148/62     Weight: 73.3 kg (161 lb 9.6 oz)  Body mass index is 26.89 kg/m².    Physical Exam   Constitutional: He is oriented to person, place, and time.   HENT:   Head: Normocephalic and atraumatic.   Eyes: Pupils are equal, round, and reactive to light. EOM are normal.   Neck: Normal range of motion. Neck supple. No JVD present.   Cardiovascular: Normal rate, regular rhythm and normal heart sounds.   Pulmonary/Chest: Effort normal and breath sounds normal. No respiratory distress. He has no wheezes. He exhibits no tenderness.   Abdominal: Soft. Bowel sounds are normal.   Genitourinary:   Genitourinary Comments: Deferred   Musculoskeletal: Normal range of motion. He exhibits no edema or deformity.   Neurological: He is alert and oriented to person, place, and time.   Skin: Skin is warm and dry. Capillary refill takes less than 2 seconds. He is not diaphoretic. No erythema.   Psychiatric: He has a normal mood and affect. His behavior is normal. Judgment and thought content normal.   Nursing note and vitals reviewed.        CRANIAL NERVES     CN III, IV, VI   Pupils are equal, round, and reactive to light.  Extraocular motions are normal.        Significant Labs:   BMP:   Recent Labs   Lab 12/05/19  2319   *      K 3.8      CO2 22*   BUN 20   CREATININE 1.4   CALCIUM 8.8     CBC:   Recent Labs   Lab 12/05/19  2030 12/05/19  2319   WBC 8.37 12.38   HGB 15.9 14.4   HCT 47.5 43.4    232     CMP:   Recent Labs   Lab 12/05/19  2030 12/05/19  2319   NA  144 142   K 3.9 3.8    109   CO2 25 22*   * 201*   BUN 17 20   CREATININE 1.6* 1.4   CALCIUM 9.9 8.8   PROT 7.1 6.7   ALBUMIN 3.9 3.6   BILITOT 0.7 0.8   ALKPHOS 90 81   AST 29 51*   ALT 27 38   ANIONGAP 12 11   EGFRNONAA 42.1* 49*     Cardiac Markers:   Recent Labs   Lab 12/05/19 2319   BNP 15     Coagulation:   Recent Labs   Lab 12/05/19 2319   INR 1.0   APTT 67.8*     Troponin:   Recent Labs   Lab 12/05/19  2030 12/05/19 2319   TROPONINI <0.006 2.674*       Significant Imaging:   Imaging Results          IR Heart Cath Images (Final result)  Result time 12/06/19 00:37:34    Final result by Arlet Villasenor RN (12/06/19 00:37:34)                 Narrative:    See OP Notes for results.     IMPRESSION: See OP Notes for results.             This procedure was auto-finalized by: Virtual Radiologist

## 2019-12-06 NOTE — PLAN OF CARE
POC reviewed with pt. AAOx4. Follows commands. DAMION.  Pt admitted to unit 0100 this AM.  O2 5L NC due to desaturation; placed on CPAP 50%.  ST on monitor (110s-120s).  Hypertensive.  Labetalol administered x1; 10 mg. Voided per urinal x1.  Morphine administered x1.  NS infusing at 125 ml/hr.  Call bell in reach. Bed locked and in lowest position. SR upx2.

## 2019-12-06 NOTE — PLAN OF CARE
Pt aaox3.  Pt is NSR/ST on the heart monitor with frequent PVCs with runs of MD dominique aware.  Echo today showed EF 15%.  Pt has not had any CP today.  Only complaint is 4 episodes of emesis, prn zofran and phenergan given.  Resp: sats 88-92% on 5L NC.  : pt voiding per urinal with minimal UOP, lasix given and pt only urinated 250ml, total UOP this shift 500ml. Skin: right groin site CDI.  Pt turned and repositioned with use of pillows and wedge, heel boots in place.  PIV intact with no redness, swelling or drainage.   Gtts: NS 75ml/hr.  Sedation turned off on arrival to ICU, pt starting to move extremities spontaneously intermittently but not consistently and not following commands.  Bed low, wheels locked, alarms audible.  Plan of care reviewed with pt family.  Pt made a DNR.  Pt family verbalizes understanding. Will continue to monitor.

## 2019-12-06 NOTE — PROGRESS NOTES
[facility] -   Cardiology  Progress Note    Patient Name: Raleigh Walsh  MRN: [id number]  Admission Date: 12/5/2019  Hospital Length of Stay: 1 days  Code Status: No Order   Attending Physician: Gilmar Florence MD   Primary Care Physician: Akhil Smith MD  Expected Discharge Date:   Principal Problem:STEMI (ST elevation myocardial infarction)    Subjective:   HPI    73 y/o male woth PMHx for CADHx of PCI, HTN, HLP presented to [facility] ER with anterior MI and tx with TNK. [facility] Pt initially reperfused clinically but later on with CP.  Pt taken to the cath lab for postinfarct angina.      Hospital Course:   12/6/19--Patient seen and examined in room, lying in bed. Continues to complain of intermittent chest discomfort today but much improved since PCI. He was noted to have 8-10 beats of VT on monitor at time of exam this AM. Keep in ICU for now. Labs reviewed, K+ 4.8, Cr 1.4, Troponin >50.     Interval History: no acute issues noted o/n. Intermittent chest discomfort noted but much improved today.     Review of Systems   Constitution: Positive for malaise/fatigue.   HENT: Negative for hearing loss and hoarse voice.    Eyes: Negative for blurred vision and visual disturbance.   Cardiovascular: Positive for chest pain and irregular heartbeat. Negative for claudication, dyspnea on exertion, leg swelling, near-syncope, orthopnea, palpitations, paroxysmal nocturnal dyspnea and syncope.   Respiratory: Positive for snoring. Negative for cough, hemoptysis, shortness of breath, sleep disturbances due to breathing and wheezing.    Endocrine: Negative for cold intolerance and heat intolerance.   Hematologic/Lymphatic: Bruises/bleeds easily.   Skin: Negative for color change, dry skin and nail changes.   Musculoskeletal: Positive for arthritis and back pain. Negative for joint pain and myalgias.   Gastrointestinal: Negative for bloating, abdominal pain, constipation, nausea and vomiting.    Genitourinary: Negative for dysuria, flank pain, hematuria and hesitancy.   Neurological: Positive for weakness. Negative for headaches, light-headedness, loss of balance, numbness and paresthesias.   Psychiatric/Behavioral: Negative for altered mental status.   Allergic/Immunologic: Negative for environmental allergies.     Objective:     Vital Signs (Most Recent):  Temp: 97.5 °F (36.4 °C) (12/06/19 0715)  Pulse: 89 (12/06/19 1010)  Resp: 16 (12/06/19 1010)  BP: 99/76 (12/06/19 1010)  SpO2: (!) 89 % (12/06/19 1010) Vital Signs (24h Range):  Temp:  [97.5 °F (36.4 °C)-98.6 °F (37 °C)] 97.5 °F (36.4 °C)  Pulse:  [] 89  Resp:  [11-26] 16  SpO2:  [88 %-97 %] 89 %  BP: ()/() 99/76     Weight: 73.3 kg (161 lb 9.6 oz)  Body mass index is 26.89 kg/m².     SpO2: (!) 89 %  O2 Device (Oxygen Therapy): nasal cannula      Intake/Output Summary (Last 24 hours) at 12/6/2019 1113  Last data filed at 12/6/2019 0948  Gross per 24 hour   Intake 785.42 ml   Output 275 ml   Net 510.42 ml       Lines/Drains/Airways     Peripheral Intravenous Line                 Peripheral IV - Single Lumen 12/05/19 2032 18 G Right Antecubital less than 1 day         Peripheral IV - Single Lumen 12/05/19 2035 18 G Left Antecubital less than 1 day         Peripheral IV - Single Lumen 12/05/19 2329 20 G Right Forearm less than 1 day                Physical Exam   Constitutional: He is oriented to person, place, and time. He appears well-developed and well-nourished. No distress.   HENT:   Head: Normocephalic and atraumatic.   Eyes: Pupils are equal, round, and reactive to light.   Neck: Normal range of motion and full passive range of motion without pain. Neck supple. No JVD present. No tracheal deviation present. No thyromegaly present.   Cardiovascular: Normal rate, regular rhythm, S1 normal, S2 normal and intact distal pulses. Frequent extrasystoles are present. PMI is not displaced. Exam reveals no distant heart sounds.   No murmur  heard.  Pulses:       Radial pulses are 2+ on the right side, and 2+ on the left side.        Dorsalis pedis pulses are 2+ on the right side, and 2+ on the left side.   Episode of VT this AM at time of exam    Right groin access site C/D/I, no hematoma or ecchymosis noted. No drainage or signs of infection noted.    Pulmonary/Chest: Effort normal and breath sounds normal. No accessory muscle usage. No respiratory distress. He has no decreased breath sounds. He has no wheezes. He has no rales.   Abdominal: Soft. Bowel sounds are normal. He exhibits no distension. There is no tenderness.   Musculoskeletal: Normal range of motion. He exhibits no edema.        Right ankle: He exhibits no swelling.        Left ankle: He exhibits no swelling.   Neurological: He is alert and oriented to person, place, and time.   Skin: Skin is warm and dry. He is not diaphoretic. No cyanosis. Nails show no clubbing.   Psychiatric: He has a normal mood and affect. His speech is normal and behavior is normal. Judgment and thought content normal. Cognition and memory are normal.   Nursing note and vitals reviewed.      Significant Labs:   BMP:   Recent Labs   Lab 12/05/19 2030 12/05/19 2319 12/06/19  0324   * 201* 170*    142 138   K 3.9 3.8 4.8    109 105   CO2 25 22* 20*   BUN 17 20 21   CREATININE 1.6* 1.4 1.4   CALCIUM 9.9 8.8 9.0   , CBC   Recent Labs   Lab 12/05/19 2030 12/05/19 2319 12/06/19  0324   WBC 8.37 12.38 10.33   HGB 15.9 14.4 16.0   HCT 47.5 43.4 48.4    232 287   , Lipid Panel No results for input(s): CHOL, HDL, LDLCALC, TRIG, CHOLHDL in the last 48 hours., Troponin   Recent Labs   Lab 12/05/19 2030 12/05/19 2319 12/06/19  0324   TROPONINI <0.006 2.674* >50.000*    and All pertinent lab results from the last 24 hours have been reviewed.    Significant Imaging: Echocardiogram: 2D echo with color flow doppler: No results found for this or any previous visit. and X-Ray: CXR: X-Ray Chest 1 View  (CXR): No results found for this visit on 12/05/19. and X-Ray Chest PA and Lateral (CXR): No results found for this visit on 12/05/19.    Assessment and Plan:       * STEMI (ST elevation myocardial infarction)  73 y/o male woth PMHx for CADHx of PCI, HTN, HLP presented to [facility] ER with anterior MI and tx with TNK. Pt transferred to [facility]. Pt initially reperfused clinically but later on with CP.  Pt taken to the cath lab for postinfarct angina.    12/6/19  -s/p PCI of LAD per Dr. Alcazar  -ECHO pending  -Continue ASA, Statin, BB, ACEi, Plavix  -Check FLP  -Keep in ICU for today  -Dash diet, 2 gm sodium restriction  -1.5L Fluid restriction  -Repeat labs in AM    Paroxysmal VT  Continue BB  Keep K+ >4 and Mag >2  Continue telemetry monitoring  Keep in ICU for now    Mixed hyperlipidemia  Continue statin  Check FLP        VTE Risk Mitigation (From admission, onward)         Ordered     enoxaparin injection 40 mg  Daily      12/06/19 0910                EMILIE HunterC  Cardiology  [facility]

## 2019-12-06 NOTE — HOSPITAL COURSE
Admitted to ICU overnight post [facility]  Intermittent complaints of chest discomfort along with occasional reperfusion ectopy on cardiac monitor  Intermittent nausea/vomiting throughout today  12/7 - tachycardia, anxiety reported overnight; this am tachycardia, chest pressure, SOB with transition off nocturnal CPAP to NC; troponin remains > 50k  12/8 - less pronounced dyspnea and tachycardia today; Dr Andres reports no decline on repeat echo last evening; afebrile; diuresed 1.5L  12/9 - overnight ST converted to atrial flutter with RVR, amiodarone and heparin infusions initiated; BP remains marginal; intermittent dyspnea with some improvement  12/10 - SR on monitor with amiodarone and heparin infusions; continued intermittent SOB, chest tightness; 12 Lead EKG reveals no changes; BP remains soft, lopressor held overnight  12/11 - overnight intermittent atrial fib with RVR did not require intervention, remains on heparin infusion; one episode of mild chest pain, self resolved  12/14 - Patient had transfer orders for Tele placed 12/11.  Continued to have abd discomfort and creatine increased to 1.7 yesterday and Lasix held and patient denied SOB or CP per notes.  Until yesterday afternoon developed SOB and placed on BiPAP.  Early this AM decreased LOC, increased SOB, WBC up with 29% bandemia, creatine increased to 3.6 w/ oliguria and K+ 6.1.  LFTs increased and patient appears jaundice and Ammonia 98.  LA 10 and glucose in 40s.  CXR with mod to large bilat pleural effusions.  Transferred to ICU on BiPAP and somnolent.  Assessed patient and reviewed chart and labs.

## 2019-12-06 NOTE — ASSESSMENT & PLAN NOTE
75 y/o male woth PMHx for CADHx of PCI, HTN, HLP presented to [facility] ER with anterior MI and tx with TNK. Pt transferred to [facility]. Pt initially reperfused clinically but later on with CP.  Pt taken to the cath lab for postinfarct angina.    12/6/19  -s/p PCI of LAD per Dr. Alcazar  -ECHO pending  -Continue ASA, Statin, BB, ACEi, Plavix  -Check FLP  -Keep in ICU for today  -Dash diet, 2 gm sodium restriction  -1.5L Fluid restriction  -Repeat labs in AM

## 2019-12-06 NOTE — BRIEF OP NOTE
<[facility]  Surgery Department  Operative Note    SUMMARY     Date of Procedure: 12/5/2019     Procedure: Procedure(s) (LRB):  CATHETERIZATION, HEART, LEFT (Left)     Surgeon(s) and Role:     * Remberto Alcazar MD - Primary    Assisting Surgeon: None    Pre-Operative Diagnosis: STEMI (ST elevation myocardial infarction) [I21.3]    Post-Operative Diagnosis: Post-Op Diagnosis Codes:     * STEMI (ST elevation myocardial infarction) [I21.3]    Anesthesia: RN IV Sedation    Technical Procedures Used: [facility]    Description of the Findings of the Procedure: LHC, PCI OF LAD STENT    Significant Surgical Tasks Conducted by the Assistant(s), if Applicable: NONE    Complications: No    Estimated Blood Loss (EBL): < 50 cc           Implants: * No implants in log *    Specimens:   Specimen (12h ago, onward)    None                  Condition: Good    Disposition: PACU - hemodynamically stable.    Attestation: I was present and scrubbed for the entire procedure.

## 2019-12-06 NOTE — ASSESSMENT & PLAN NOTE
Post PCI  Echo results pending  On ASA, plavix, statin, BB and ACEi  Continue ICU cardiac monitoring

## 2019-12-06 NOTE — CONSULTS
[facility] -   Critical Care Medicine  Consult Note    Patient Name: Raleigh Walsh  MRN: [id number]  Admission Date: 12/5/2019  Hospital Length of Stay: 1 days  Code Status: No Order  Attending Physician: Gilmar Florence MD   Primary Care Provider: Akhil Smith MD   Principal Problem: STEMI (ST elevation myocardial infarction)      Subjective:     HPI:  74 year old male with PMH including CAD s/p MI with stent to LAD; HTN; HLD; depression; insomnia; reports YESENIA diagnosis and has CPAP but has not routinely used  Presented to [facility] ED with CP and EKG showed STEMI  tnkase given at 2046 and he was urgently transferred here for evaluation by cardiology  Taken for [facility] after arrival at this facility  PCI of LAD stent with balloon + new LAD stent perfromed    Hospital/ICU Course:  Admitted to ICU overnight post C  Intermittent complaints of chest discomfort along with occasional reperfusion ectopy on cardiac monitor  Intermittent nausea/vomiting throughout today    Past Medical History:   Diagnosis Date    Angina pectoris     Depression     Hyperlipidemia     Hypertension     Insomnia     MI (myocardial infarction) 2009    YESENIA on CPAP        Past Surgical History:   Procedure Laterality Date    CERVICAL FUSION      CORONARY ANGIOPLASTY WITH STENT PLACEMENT  2009       Review of patient's allergies indicates:  No Known Allergies    Family History     None        Tobacco Use    Smoking status: Former Smoker    Smokeless tobacco: Never Used   Substance and Sexual Activity    Alcohol use: Never     Frequency: Never    Drug use: Never    Sexual activity: Not on file         Review of Systems   Constitutional: Positive for fatigue. Negative for chills and fever.   HENT: Negative for congestion.    Eyes: Negative.    Respiratory: Negative for shortness of breath.         YESENIA   Cardiovascular: Positive for chest pain. Negative for leg swelling.   Gastrointestinal: Positive for nausea and  vomiting.   Musculoskeletal: Positive for arthralgias and back pain.   Neurological: Positive for weakness. Negative for dizziness, tremors and speech difficulty.   Hematological: Bruises/bleeds easily.   Psychiatric/Behavioral: The patient is not nervous/anxious.      Objective:     Vital Signs (Most Recent):  Temp: 97.6 °F (36.4 °C) (12/06/19 1115)  Pulse: 90 (12/06/19 1115)  Resp: 17 (12/06/19 1115)  BP: 101/77 (12/06/19 1115)  SpO2: (!) 90 % (12/06/19 1115) Vital Signs (24h Range):  Temp:  [97.5 °F (36.4 °C)-98.6 °F (37 °C)] 97.6 °F (36.4 °C)  Pulse:  [] 90  Resp:  [11-26] 17  SpO2:  [88 %-97 %] 90 %  BP: ()/() 101/77     Weight: 73.3 kg (161 lb 9.6 oz)  Body mass index is 26.89 kg/m².      Intake/Output Summary (Last 24 hours) at 12/6/2019 1215  Last data filed at 12/6/2019 0948  Gross per 24 hour   Intake 785.42 ml   Output 275 ml   Net 510.42 ml       Physical Exam   Constitutional: He is oriented to person, place, and time. No distress.   HENT:   Head: Atraumatic.   Eyes: Pupils are equal, round, and reactive to light. Conjunctivae are normal.   Neck: No tracheal deviation present.   Cardiovascular: Normal rate and regular rhythm.   No murmur heard.  Pulses:       Radial pulses are 2+ on the right side, and 2+ on the left side.        Dorsalis pedis pulses are 2+ on the right side, and 2+ on the left side.   Pulmonary/Chest: Effort normal and breath sounds normal. He has no wheezes. He has no rales.   Abdominal: Soft. Bowel sounds are normal.   Musculoskeletal: He exhibits no edema.   Neurological: He is alert and oriented to person, place, and time.   Skin: Skin is warm and dry. Capillary refill takes less than 2 seconds.            Vents:  Oxygen Concentration (%): 50 (12/06/19 0300)    Lines/Drains/Airways     Peripheral Intravenous Line                 Peripheral IV - Single Lumen 12/05/19 2032 18 G Right Antecubital less than 1 day         Peripheral IV - Single Lumen 12/05/19 2035 18  G Left Antecubital less than 1 day         Peripheral IV - Single Lumen 12/05/19 2329 20 G Right Forearm less than 1 day                Significant Labs:    CBC/Anemia Profile:  Recent Labs   Lab 12/05/19 2030 12/05/19 2319 12/06/19  0324   WBC 8.37 12.38 10.33   HGB 15.9 14.4 16.0   HCT 47.5 43.4 48.4    232 287   MCV 95 95 95   RDW 12.7 12.5 12.6        Chemistries:  Recent Labs   Lab 12/05/19 2030 12/05/19 2319 12/06/19  0324    142 138   K 3.9 3.8 4.8    109 105   CO2 25 22* 20*   BUN 17 20 21   CREATININE 1.6* 1.4 1.4   CALCIUM 9.9 8.8 9.0   ALBUMIN 3.9 3.6 3.9   PROT 7.1 6.7 7.5   BILITOT 0.7 0.8 1.0   ALKPHOS 90 81 95   ALT 27 38 107*   AST 29 51* 470*       All pertinent labs within the past 24 hours have been reviewed.    Significant Imaging:   I have reviewed all pertinent imaging results/findings within the past 24 hours.      ABG  No results for input(s): PH, PO2, PCO2, HCO3, BE in the last 168 hours.  Assessment/Plan:     Cardiac/Vascular  * STEMI (ST elevation myocardial infarction)  Post PCI  Echo results pending  On ASA, plavix, statin, BB and ACEi  Continue ICU cardiac monitoring    Paroxysmal VT  Reperfusion beats likely  Continue BB  Monitor electrolytes and replace as indicated    GI  Nausea  Prn antiemetic  Check orthostatic VS    Other  YESENIA (obstructive sleep apnea)  Nocturnal CPAP ordered      Critical Care Daily Checklist:    A: Awake: RASS Goal/Actual Goal:    Actual: Musa Agitation Sedation Scale (RASS): Alert and calm   B: Spontaneous Breathing Trial Performed?     C: SAT & SBT Coordinated?  n/a                      D: Delirium: CAM-ICU Overall CAM-ICU: Negative   E: Early Mobility Performed? Yes   F: Feeding Goal:    Status:     Current Diet Order   Procedures    Diet Cardiac      AS: Analgesia/Sedation prn   T: Thromboembolic Prophylaxis SCDs, start lovenox 24 hours post tnkase   H: HOB > 300 Yes   U: Stress Ulcer Prophylaxis (if needed) pepcid   G: Glucose  Control monitoring   B: Bowel Function     I: Indwelling Catheter (Lines & New) Necessity reviewed   D: De-escalation of Antimicrobials/Pharmacotherapies reviewed    Plan for the day/ETD ICU hemodynamic monitoring      Family/Goals of Care:   Home on discharge   I have discussed case and plan of care in detail with Dr Barnhart; Status and plan of care were discussed with team on multidisciplinary rounds.  Will follow and assist while in ICU and sign off on transfer out of unit; please call if we can be of assistance after transfer.    Thank you for your consult.      MARIELENA Pa-BC  Critical Care Medicine  [facility]

## 2019-12-06 NOTE — ED NOTES
Patient and family very upset that cardiology is not seeing them tonight, CP is increasing; secure chat sent to Ottoniel and DELISA.

## 2019-12-06 NOTE — ED TRIAGE NOTES
Patient presents to ED via POV with c/o chest pain.  That started approximately 30 min prior to arrival.

## 2019-12-06 NOTE — PROGRESS NOTES
Pt has had 3 episodes of emesis, zofran given this AM 0715.  Pt states he has gotten sick before when he gets pain med or anesthesia.  Ludivina WALDEN made aware and at bedside.  Md stated to order phenergan.  MD also made aware of pt sats staying 88-91% on 5 L NC pt denies any SOB.  Chest xray ordered and will be done shortly per radiology.  Will continue to monitor.

## 2019-12-06 NOTE — HOSPITAL COURSE
12/6/19--Patient seen and examined in room, lying in bed. Continues to complain of intermittent chest discomfort today but much improved since PCI. He was noted to have 8-10 beats of VT on monitor at time of exam this AM. Keep in ICU for now. Labs reviewed, K+ 4.8, Cr 1.4, Troponin >50.     12/7/19--Patient seen and examined in ICU, lying in bed. Had multiple episodes of shortness of breath, palpitations with diaphoresis noted o/n. HR at time of exam 110s. Will optimize medical regimen for CAD/CHF today. Discussed with patient and family at bedside given guarded prognosis given ICM post STEMI. Will add Tridil gtt today and reassess response.     12/8/19--Patient seen and examined in ICU today. Reports continued shortness of breath today but slightly/marginally improved from yesterday. HR remains elevated today in 110-120s, EKG revealed ST with PACs today. Patient unable to tolerate Tridil gtt yesterday due to hypotension. Will try to optimize medical regimen today but low BP and elevated HR makes medical therapy challenging. Labs reviewed, K+ 4.3, Cr 1.7, BUN 42. Repeat CXR ordered.     12/9/19-Patient seen and examined today. Still SOB and fatigued, some improvement overnight. Denies chest pain. Converted to aflutter this AM, amiodarone drip initiated without bolus. Labs reviewed. Creatinine 1.5, liver enzymes improving. Troponin trending down. BP remains marginal.     12/10/19-Patient seen and examined today. Appears slightly stronger, more talkative. Still endorses SOB. No sharan chest pain or tightness. Converted to SR overnight. Labs reviewed. Creatinine stable.     12/11/19-Patient seen and examined today, sitting up in chair. Slowly progressing. SOB improved. Did admit to some mild chest tightness overnight, has since resolved. Intermittent afib noted as well. Labs reviewed, K slightly low at 3.3. BP stable.     12/12/19-Patient seen and examined today, sitting up in chair. Ambulated around ICU unit this AM.  Admits to fatigue/malaise. SOB improving. No chest pain/tightness overnight. Labs reviewed and are stable. BP borderline.    12/13/19--Patient seen and examined in ICU today, complains of abdominal discomfort today. No chest pain or SOB today. Labs reviewed, bump in creatinine to 1.7 today. Hold Lasix today and resume tomorrow PO Lasix 20 or 40 mg daily.     12/14/19-Patient seen and examined today. Deteriorated overnight with worsening respiratory status and cardiogenic shock/multiorgan failure. Somnolent this AM on Bipap. BP low, even on pressor support. Labs reviewed. Creatinine 3.9. LA > 10. AST/ALT markedly elevated. WBC 15,000. Ammonia 98. Critical care med discussed code status with family, changed to DNR with plans for comfort care. Repeat echo pending.

## 2019-12-06 NOTE — ED NOTES
EKG complete.  Dr. Shah spoke with Dr. Alcazar and he is on his way to ER. ICU was informed that the pt is here

## 2019-12-06 NOTE — HPI
73 YO WM with known CAD S/P MI and prior stenting;  Presented to ED at [facility] after experiencing chest pain while at a ballgame; He ruled in foe a STEMI; was treated with TPA and transferred here; He was taken to the cath lab where he underwent PCI with stent placed to the proximal LAD by Dr. Alcazar.  He was transferred to the ICU in stable condition.

## 2019-12-06 NOTE — ED PROVIDER NOTES
Encounter Date: 12/5/2019       History     Chief Complaint   Patient presents with    Chest Pain     Chief complaint: Chest Pain    73 y/o male with c/o acute onset of chest pain onset 20-30 minutes ago.  Usually has NTG but did not have it with himl  Substernal with abrupt onset 20 minutes PTA. Prior history of stents . Cardiologist with [facility] in [place], Dr. Wild.  Recnet history of SOB with exertion with spontaneous resolution        Review of patient's allergies indicates:  No Known Allergies  History reviewed. No pertinent past medical history.  History reviewed. No pertinent surgical history.  History reviewed. No pertinent family history.  Social History     Tobacco Use    Smoking status: Not on file   Substance Use Topics    Alcohol use: Not on file    Drug use: Not on file     Review of Systems   Constitutional: Positive for diaphoresis.   HENT: Negative.    Respiratory: Positive for chest tightness. Negative for shortness of breath.    Cardiovascular: Positive for chest pain.   Gastrointestinal: Negative.    Genitourinary: Negative.    Musculoskeletal: Negative.    All other systems reviewed and are negative.      Physical Exam     Initial Vitals [12/05/19 2030]   BP Pulse Resp Temp SpO2   (!) 158/96 85 20 98.4 °F (36.9 °C) 95 %      MAP       --         Physical Exam    Constitutional: He appears well-developed and well-nourished.  Non-toxic appearance. He does not appear ill. He appears distressed.   HENT:   Head: Normocephalic.   Eyes: EOM are normal. Pupils are equal, round, and reactive to light.   Neck: Normal range of motion.   Cardiovascular: Normal rate and regular rhythm.   Pulmonary/Chest: Effort normal and breath sounds normal.   Musculoskeletal: Normal range of motion.   Neurological: He is alert and oriented to person, place, and time.   Skin: Skin is dry. Capillary refill takes less than 2 seconds.   Psychiatric: His behavior is normal. His mood appears anxious.         ED Course    Critical Care  Date/Time: 12/5/2019 11:28 PM  Performed by: Yunior Staley MD  Authorized by: Albert Ayon Do, MD   Direct patient critical care time: 12 minutes  Additional history critical care time: 5 minutes  Ordering / reviewing critical care time: 15 minutes  Documentation critical care time: 10 minutes  Consulting other physicians critical care time: 5 minutes  Consult with family critical care time: 5 minutes  Total critical care time (exclusive of procedural time) : 52 minutes        Labs Reviewed - No data to display  EKG Readings: (Independently Interpreted)   Initial Reading: STEMI. Rhythm: Normal Sinus Rhythm. ST Segment Depression: II, III and AVF. T Waves Elevated: V1, V2, V3, V4 and V5. Axis: Normal.       Imaging Results    None          Medical Decision Making:   Initial Assessment:   Stat EKG with STEMI likely anteroseptal. Stat page to Cardiology and spoke with Dr. Alcazar and reviewed EKG and recommended TNKase and protocol to follow with heparin. Arrangements for stat transfer by ground unit to [facility] adfter discussion with family. OK with patient and spouse for transfer to [facility].  Expeditious transfer was had in lieu of [facility] truck on site to transport patient to [facility].* Spouse initially suggested [facility], but nurse advised that this hospital was on saturation and not likely to have bed for care, spouse stating [facility] was OK and agreed to the transfer to [facility] for Care of interventional cardiology.  Differential Diagnosis:   STEMI  ED Management:  TnKase/protocol with heparin infusing. NTG and IV zofran for nausea. Pt with improved symptoms prior to transfer with decreased chest discomfort though not completely gone.  Other:   I have discussed this case with another health care provider.       <> Summary of the Discussion: Dr. Alcazar stat discussion upon EKG reading. Wishes to have TNKase and transport.                                 Clinical Impression:                ICD-10-CM ICD-9-CM   1. STEMI (ST elevation myocardial infarction) I21.3 410.90   2. Chest pain R07.9 786.50   3. Abnormal EKG R94.31 794.31          Yunior Staley MD  12/06/19 0426

## 2019-12-06 NOTE — ASSESSMENT & PLAN NOTE
75 y/o male woth PMHx for CADHx of PCI, HTN, HLP presented to [facility] ER with anterior MI and tx with TNK. Pt transferred to [facility]. Pt initially reperfused clinically but later on with CP.  Pt taken to the cath lab for postinfarct angina.

## 2019-12-06 NOTE — PROGRESS NOTES
eICU Brief Note    Issue: Post  PCI of LAD stent ; STEMI on EKG  Got heparin IV    Plan :  Monitor for chest pain  Low K to consider repleting    D/w RN

## 2019-12-06 NOTE — HPI
73 y/o male woth PMHx for CADHx of PCI, HTN, HLP presented to [facility] ER with anterior MI and tx with TNK. Pt transferred to [facility]. Pt initially reperfused clinically but later on with CP.  Pt taken to the cath lab for postinfarct angina.

## 2019-12-06 NOTE — H&P
[facility] Hospital Medicine  History & Physical    Patient Name: Raleigh Walsh  MRN: [id number]  Admission Date: 12/5/2019  Attending Physician: Dameon Chavez MD  Primary Care Provider: Akhil Smith MD         Patient information was obtained from patient, spouse/SO and ER records.     Subjective:     Principal Problem:STEMI (ST elevation myocardial infarction)    Chief Complaint:   Chief Complaint   Patient presents with    Chest Pain        HPI: 73 YO WM with known CAD S/P MI and prior stenting;  Presented to ED at [facility] after experiencing chest pain while at a ballgame; He ruled in foe a STEMI; was treated with TPA and transferred here; He was taken to the cath lab where he underwent PCI with stent placed to the proximal LAD by Dr. Alcazar.  He was transferred to the ICU in stable condition.    Past Medical History:   Diagnosis Date    Angina pectoris     Depression     Hyperlipidemia     Hypertension     Insomnia     MI (myocardial infarction) 2009       Past Surgical History:   Procedure Laterality Date    CERVICAL FUSION      CORONARY ANGIOPLASTY WITH STENT PLACEMENT  2009       Review of patient's allergies indicates:  No Known Allergies    No current facility-administered medications on file prior to encounter.      Current Outpatient Medications on File Prior to Encounter   Medication Sig    aspirin (ECOTRIN) 81 MG EC tablet Take 81 mg by mouth.    escitalopram oxalate (LEXAPRO) 10 MG tablet Take 10 mg by mouth.    isosorbide dinitrate (ISORDIL) 10 MG tablet Take 10 mg by mouth.    melatonin 3 mg Cap Take by mouth.    metoprolol succinate (TOPROL-XL) 25 MG 24 hr tablet Take 12.5 mg by mouth.    multivitamin capsule Take 1 capsule by mouth once daily.    omega-3 fatty acids/fish oil (FISH OIL-OMEGA-3 FATTY ACIDS) 300-1,000 mg capsule Take 1 g by mouth.    pravastatin (PRAVACHOL) 20 MG tablet Take 20 mg by mouth.    ramipril (ALTACE) 5 MG capsule Take 5 mg by  mouth.     Family History     None        Tobacco Use    Smoking status: Former Smoker    Smokeless tobacco: Never Used   Substance and Sexual Activity    Alcohol use: Never     Frequency: Never    Drug use: Never    Sexual activity: Not on file     Review of Systems   Constitutional: Negative for activity change, appetite change, diaphoresis, fatigue and fever.   HENT: Negative.    Respiratory: Positive for chest tightness and shortness of breath. Negative for apnea.    Cardiovascular: Positive for chest pain. Negative for palpitations and leg swelling.   Gastrointestinal: Negative for abdominal distention and nausea.   Genitourinary: Negative for difficulty urinating.   Musculoskeletal: Negative.    Allergic/Immunologic: Negative.    Neurological: Negative for dizziness and syncope.   Hematological: Negative for adenopathy.   Psychiatric/Behavioral: Negative for agitation and behavioral problems.     Objective:     Vital Signs (Most Recent):  Temp: 97.7 °F (36.5 °C) (12/06/19 0106)  Pulse: 96 (12/06/19 0106)  Resp: 12 (12/06/19 0106)  BP: (!) 148/62 (12/06/19 0106)  SpO2: (!) 90 % (12/06/19 0106) Vital Signs (24h Range):  Temp:  [97.7 °F (36.5 °C)-98.6 °F (37 °C)] 97.7 °F (36.5 °C)  Pulse:  [75-97] 96  Resp:  [11-21] 12  SpO2:  [90 %-97 %] 90 %  BP: (117-158)/(62-97) 148/62     Weight: 73.3 kg (161 lb 9.6 oz)  Body mass index is 26.89 kg/m².    Physical Exam   Constitutional: He is oriented to person, place, and time.   HENT:   Head: Normocephalic and atraumatic.   Eyes: Pupils are equal, round, and reactive to light. EOM are normal.   Neck: Normal range of motion. Neck supple. No JVD present.   Cardiovascular: Normal rate, regular rhythm and normal heart sounds.   Pulmonary/Chest: Effort normal and breath sounds normal. No respiratory distress. He has no wheezes. He exhibits no tenderness.   Abdominal: Soft. Bowel sounds are normal.   Genitourinary:   Genitourinary Comments: Deferred   Musculoskeletal:  Normal range of motion. He exhibits no edema or deformity.   Neurological: He is alert and oriented to person, place, and time.   Skin: Skin is warm and dry. Capillary refill takes less than 2 seconds. He is not diaphoretic. No erythema.   Psychiatric: He has a normal mood and affect. His behavior is normal. Judgment and thought content normal.   Nursing note and vitals reviewed.        CRANIAL NERVES     CN III, IV, VI   Pupils are equal, round, and reactive to light.  Extraocular motions are normal.        Significant Labs:   BMP:   Recent Labs   Lab 12/05/19 2319   *      K 3.8      CO2 22*   BUN 20   CREATININE 1.4   CALCIUM 8.8     CBC:   Recent Labs   Lab 12/05/19  2030 12/05/19 2319   WBC 8.37 12.38   HGB 15.9 14.4   HCT 47.5 43.4    232     CMP:   Recent Labs   Lab 12/05/19 2030 12/05/19 2319    142   K 3.9 3.8    109   CO2 25 22*   * 201*   BUN 17 20   CREATININE 1.6* 1.4   CALCIUM 9.9 8.8   PROT 7.1 6.7   ALBUMIN 3.9 3.6   BILITOT 0.7 0.8   ALKPHOS 90 81   AST 29 51*   ALT 27 38   ANIONGAP 12 11   EGFRNONAA 42.1* 49*     Cardiac Markers:   Recent Labs   Lab 12/05/19 2319   BNP 15     Coagulation:   Recent Labs   Lab 12/05/19 2319   INR 1.0   APTT 67.8*     Troponin:   Recent Labs   Lab 12/05/19 2030 12/05/19 2319   TROPONINI <0.006 2.674*       Significant Imaging:   Imaging Results          IR Heart Cath Images (Final result)  Result time 12/06/19 00:37:34    Final result by Arlet Villasenor RN (12/06/19 00:37:34)                 Narrative:    See OP Notes for results.     IMPRESSION: See OP Notes for results.             This procedure was auto-finalized by: Virtual Radiologist                                Assessment/Plan:     * STEMI (ST elevation myocardial infarction)  STEMI   S/p PCI with JOSE to proximal LAD (See operative Note)  Continue ASA/Statin/BB/Plavix  Topical nitrites;  Supplemental oxygen;   Consider CPAP for desaturations  Pain relief  measures: hydrocodone and Prn morphine  Continue as per Cardiology;  Trend troponins        VTE Risk Mitigation (From admission, onward)    None        Critical care time spent on the evaluation and treatment of severe organ dysfunction, review of pertinent labs and imaging studies, discussions with consulting providers and discussions with patient/family: 35 minutes.     Dameon Gonzalez MD  Department of Hospital Medicine   [facility]

## 2019-12-06 NOTE — SUBJECTIVE & OBJECTIVE
Interval History: no acute issues noted o/n. Intermittent chest discomfort noted but much improved today.     Review of Systems   Constitution: Positive for malaise/fatigue.   HENT: Negative for hearing loss and hoarse voice.    Eyes: Negative for blurred vision and visual disturbance.   Cardiovascular: Positive for chest pain and irregular heartbeat. Negative for claudication, dyspnea on exertion, leg swelling, near-syncope, orthopnea, palpitations, paroxysmal nocturnal dyspnea and syncope.   Respiratory: Positive for snoring. Negative for cough, hemoptysis, shortness of breath, sleep disturbances due to breathing and wheezing.    Endocrine: Negative for cold intolerance and heat intolerance.   Hematologic/Lymphatic: Bruises/bleeds easily.   Skin: Negative for color change, dry skin and nail changes.   Musculoskeletal: Positive for arthritis and back pain. Negative for joint pain and myalgias.   Gastrointestinal: Negative for bloating, abdominal pain, constipation, nausea and vomiting.   Genitourinary: Negative for dysuria, flank pain, hematuria and hesitancy.   Neurological: Positive for weakness. Negative for headaches, light-headedness, loss of balance, numbness and paresthesias.   Psychiatric/Behavioral: Negative for altered mental status.   Allergic/Immunologic: Negative for environmental allergies.     Objective:     Vital Signs (Most Recent):  Temp: 97.5 °F (36.4 °C) (12/06/19 0715)  Pulse: 89 (12/06/19 1010)  Resp: 16 (12/06/19 1010)  BP: 99/76 (12/06/19 1010)  SpO2: (!) 89 % (12/06/19 1010) Vital Signs (24h Range):  Temp:  [97.5 °F (36.4 °C)-98.6 °F (37 °C)] 97.5 °F (36.4 °C)  Pulse:  [] 89  Resp:  [11-26] 16  SpO2:  [88 %-97 %] 89 %  BP: ()/() 99/76     Weight: 73.3 kg (161 lb 9.6 oz)  Body mass index is 26.89 kg/m².     SpO2: (!) 89 %  O2 Device (Oxygen Therapy): nasal cannula      Intake/Output Summary (Last 24 hours) at 12/6/2019 1113  Last data filed at 12/6/2019 0948  Gross per 24  hour   Intake 785.42 ml   Output 275 ml   Net 510.42 ml       Lines/Drains/Airways     Peripheral Intravenous Line                 Peripheral IV - Single Lumen 12/05/19 2032 18 G Right Antecubital less than 1 day         Peripheral IV - Single Lumen 12/05/19 2035 18 G Left Antecubital less than 1 day         Peripheral IV - Single Lumen 12/05/19 2329 20 G Right Forearm less than 1 day                Physical Exam   Constitutional: He is oriented to person, place, and time. He appears well-developed and well-nourished. No distress.   HENT:   Head: Normocephalic and atraumatic.   Eyes: Pupils are equal, round, and reactive to light.   Neck: Normal range of motion and full passive range of motion without pain. Neck supple. No JVD present. No tracheal deviation present. No thyromegaly present.   Cardiovascular: Normal rate, regular rhythm, S1 normal, S2 normal and intact distal pulses. Frequent extrasystoles are present. PMI is not displaced. Exam reveals no distant heart sounds.   No murmur heard.  Pulses:       Radial pulses are 2+ on the right side, and 2+ on the left side.        Dorsalis pedis pulses are 2+ on the right side, and 2+ on the left side.   Episode of VT this AM at time of exam    Right groin access site C/D/I, no hematoma or ecchymosis noted. No drainage or signs of infection noted.    Pulmonary/Chest: Effort normal and breath sounds normal. No accessory muscle usage. No respiratory distress. He has no decreased breath sounds. He has no wheezes. He has no rales.   Abdominal: Soft. Bowel sounds are normal. He exhibits no distension. There is no tenderness.   Musculoskeletal: Normal range of motion. He exhibits no edema.        Right ankle: He exhibits no swelling.        Left ankle: He exhibits no swelling.   Neurological: He is alert and oriented to person, place, and time.   Skin: Skin is warm and dry. He is not diaphoretic. No cyanosis. Nails show no clubbing.   Psychiatric: He has a normal mood and  affect. His speech is normal and behavior is normal. Judgment and thought content normal. Cognition and memory are normal.   Nursing note and vitals reviewed.      Significant Labs:   BMP:   Recent Labs   Lab 12/05/19 2030 12/05/19 2319 12/06/19  0324   * 201* 170*    142 138   K 3.9 3.8 4.8    109 105   CO2 25 22* 20*   BUN 17 20 21   CREATININE 1.6* 1.4 1.4   CALCIUM 9.9 8.8 9.0   , CBC   Recent Labs   Lab 12/05/19 2030 12/05/19 2319 12/06/19 0324   WBC 8.37 12.38 10.33   HGB 15.9 14.4 16.0   HCT 47.5 43.4 48.4    232 287   , Lipid Panel No results for input(s): CHOL, HDL, LDLCALC, TRIG, CHOLHDL in the last 48 hours., Troponin   Recent Labs   Lab 12/05/19 2030 12/05/19 2319 12/06/19 0324   TROPONINI <0.006 2.674* >50.000*    and All pertinent lab results from the last 24 hours have been reviewed.    Significant Imaging: Echocardiogram: 2D echo with color flow doppler: No results found for this or any previous visit. and X-Ray: CXR: X-Ray Chest 1 View (CXR): No results found for this visit on 12/05/19. and X-Ray Chest PA and Lateral (CXR): No results found for this visit on 12/05/19.

## 2019-12-06 NOTE — ED NOTES
Secure chat sent to Ottoniel and DELISA concerning need for pain meds and patient request for cardiology's plan of care.

## 2019-12-06 NOTE — CONSULTS
[facility]  Cardiology  Consult Note    Patient Name: Raleigh Walsh  MRN: [id number]  Admission Date: 12/5/2019  Hospital Length of Stay: 1 days  Code Status: No Order   Attending Provider: Dameon Chavez MD   Consulting Provider: Remberto Alcazar MD  Primary Care Physician: Akhil Smith MD  Principal Problem:STEMI (ST elevation myocardial infarction)    Patient information was obtained from patient and ER records.     Consults  Subjective:     Chief Complaint:  CP     HPI:   73 y/o male woth PMHx for CADHx of PCI, HTN, HLP presented to [facility] ER with anterior MI and tx with TNK. Pt transferred to [facility]. Pt initially reperfused clinically but later on with CP.  Pt taken to the cath lab for postinfarct angina.    Past Medical History:   Diagnosis Date    Angina pectoris     Depression     Hyperlipidemia     Hypertension     Insomnia     MI (myocardial infarction) 2009       Past Surgical History:   Procedure Laterality Date    CERVICAL FUSION      CORONARY ANGIOPLASTY WITH STENT PLACEMENT  2009       Review of patient's allergies indicates:  No Known Allergies    No current facility-administered medications on file prior to encounter.      Current Outpatient Medications on File Prior to Encounter   Medication Sig    aspirin (ECOTRIN) 81 MG EC tablet Take 81 mg by mouth.    escitalopram oxalate (LEXAPRO) 10 MG tablet Take 10 mg by mouth.    isosorbide dinitrate (ISORDIL) 10 MG tablet Take 10 mg by mouth.    melatonin 3 mg Cap Take by mouth.    metoprolol succinate (TOPROL-XL) 25 MG 24 hr tablet Take 12.5 mg by mouth.    multivitamin capsule Take 1 capsule by mouth once daily.    omega-3 fatty acids/fish oil (FISH OIL-OMEGA-3 FATTY ACIDS) 300-1,000 mg capsule Take 1 g by mouth.    pravastatin (PRAVACHOL) 20 MG tablet Take 20 mg by mouth.    ramipril (ALTACE) 5 MG capsule Take 5 mg by mouth.     Family History     None        Tobacco Use    Smoking status: Former Smoker     Smokeless tobacco: Never Used   Substance and Sexual Activity    Alcohol use: Never     Frequency: Never    Drug use: Never    Sexual activity: Not on file     Review of Systems   Constitution: Negative. Negative for weight gain.   HENT: Negative.    Eyes: Negative.    Cardiovascular: Negative.  Negative for chest pain, leg swelling and palpitations.   Respiratory: Negative.  Negative for shortness of breath.    Endocrine: Negative.    Hematologic/Lymphatic: Negative.    Skin: Negative.    Musculoskeletal: Negative for muscle weakness.   Gastrointestinal: Negative.    Genitourinary: Negative.    Neurological: Negative.  Negative for dizziness.   Psychiatric/Behavioral: Negative.    Allergic/Immunologic: Negative.      Objective:     Vital Signs (Most Recent):  Temp: 97.7 °F (36.5 °C) (12/06/19 0106)  Pulse: (Abnormal) 118 (12/06/19 0400)  Resp: (Abnormal) 26 (12/06/19 0400)  BP: (Abnormal) 153/120 (12/06/19 0400)  SpO2: 96 % (12/06/19 0400) Vital Signs (24h Range):  Temp:  [97.7 °F (36.5 °C)-98.6 °F (37 °C)] 97.7 °F (36.5 °C)  Pulse:  [] 118  Resp:  [11-26] 26  SpO2:  [88 %-97 %] 96 %  BP: (117-160)/() 153/120     Weight: 73.3 kg (161 lb 9.6 oz)  Body mass index is 26.89 kg/m².    SpO2: 96 %  O2 Device (Oxygen Therapy): nasal cannula      Intake/Output Summary (Last 24 hours) at 12/6/2019 0513  Last data filed at 12/6/2019 0400  Gross per 24 hour   Intake 160.42 ml   Output 25 ml   Net 135.42 ml       Lines/Drains/Airways     Peripheral Intravenous Line     Name Duration         Peripheral IV - Single Lumen 12/05/19 2032 18 G Right Antecubital less than 1 day         Peripheral IV - Single Lumen 12/05/19 2035 18 G Left Antecubital less than 1 day         Peripheral IV - Single Lumen 12/05/19 2329 20 G Right Forearm less than 1 day                Physical Exam   Constitutional: He is oriented to person, place, and time. He appears well-developed and well-nourished.   HENT:   Head: Normocephalic and  atraumatic.   Eyes: Pupils are equal, round, and reactive to light. Conjunctivae are normal.   Neck: Normal range of motion. Neck supple.   Cardiovascular: Normal rate, regular rhythm, normal heart sounds and intact distal pulses.   Pulmonary/Chest: Effort normal and breath sounds normal.   Abdominal: Soft. Bowel sounds are normal.   Neurological: He is alert and oriented to person, place, and time.   Skin: Skin is warm and dry.   Psychiatric: He has a normal mood and affect.   Nursing note and vitals reviewed.      Significant Labs: All pertinent lab results from the last 24 hours have been reviewed.    Significant Imaging: X-Ray: CXR: X-Ray Chest 1 View (CXR): No results found for this visit on 12/05/19.    Assessment and Plan:     * STEMI (ST elevation myocardial infarction)  75 y/o male woth PMHx for CADHx of PCI, HTN, HLP presented to [facility] ER with anterior MI and tx with TNK. Pt transferred [facility]. Pt initially reperfused clinically but later on with CP.  Pt taken to the cath lab for postinfarct angina.          VTE Risk Mitigation (From admission, onward)    None          Thank you for your consult. I will follow-up with patient. Please contact us if you have any additional questions.    Remberto Alcazar MD  Cardiology   [facility]

## 2019-12-06 NOTE — ED NOTES
Patient arrived via [facility] with heparin at 1000units/hr and nitro at 20mcg/min; patient is calm. Transferred to ED stretcher.

## 2019-12-06 NOTE — PLAN OF CARE
Pt presented with complaints of chest pain that began at a ball game.  Heart cath procedure done 12/5.  The pt reports that he has a hx of cardiac issues and does see a cardiologist on outpatient basis.  Prior to onset of symptoms the pt was independent with ADLs.  The pt has help at home from his spouse and uses express scripts for delivery of medications.  At this time the pt does not anticipate any CM needs.  Bedside nurse states pt may benefit from home oxygen.  CM provided a transitional care folder, information on advanced directives, information on pharmacy bedside delivery, and discharge planning begins on admission with contact information for any needs/questions.    D/C plan: home        12/06/19 1216   Discharge Assessment   Assessment Type Discharge Planning Assessment   Confirmed/corrected address and phone number on facesheet? Yes   Assessment information obtained from? Patient;Medical Record   Expected Length of Stay (days)   (tbd)   Communicated expected length of stay with patient/caregiver yes   Prior to hospitilization cognitive status: Alert/Oriented   Prior to hospitalization functional status: Independent   Current cognitive status: Alert/Oriented   Current Functional Status: Needs Assistance   Facility Arrived From: home    Lives With spouse   Able to Return to Prior Arrangements yes   Is patient able to care for self after discharge? Yes   Who are your caregiver(s) and their phone number(s)? Anahi Walsh, spouse: [phone]   Patient's perception of discharge disposition home or selfcare   Readmission Within the Last 30 Days no previous admission in last 30 days   Patient currently being followed by outpatient case management? No   Patient currently receives any other outside agency services? No   Equipment Currently Used at Home none   Do you have any problems affording any of your prescribed medications? No   Is the patient taking medications as prescribed? yes   Does the patient have  transportation home? Yes   Transportation Anticipated family or friend will provide   Does the patient receive services at the [facility]? No   Discharge Plan A Home   Discharge Plan B Home   DME Needed Upon Discharge  oxygen   Patient/Family in Agreement with Plan yes

## 2019-12-07 PROBLEM — R79.89 ELEVATED LFTS: Status: ACTIVE | Noted: 2019-01-01

## 2019-12-07 PROBLEM — N17.9 AKI (ACUTE KIDNEY INJURY): Status: ACTIVE | Noted: 2019-01-01

## 2019-12-07 PROBLEM — I50.21 ACUTE SYSTOLIC CONGESTIVE HEART FAILURE, NYHA CLASS 3: Status: ACTIVE | Noted: 2019-01-01

## 2019-12-07 PROBLEM — D72.829 LEUKOCYTOSIS: Status: ACTIVE | Noted: 2019-01-01

## 2019-12-07 NOTE — PROGRESS NOTES
Pt gets SOB and HR 120s with trying to use urinal. Ludivina WALDEN made aware and stated to place mcfarland.  Mcfarland placed at this time with no problem.  Will continue to monitor.

## 2019-12-07 NOTE — PROGRESS NOTES
[facility] -   Critical Care Medicine  Progress Note    Patient Name: Raleigh Walsh  MRN: [id number]  Admission Date: 12/5/2019  Hospital Length of Stay: 2 days  Code Status: No Order  Attending Provider: Gilmar Florence MD  Primary Care Provider: Akhil Smith MD   Principal Problem: STEMI (ST elevation myocardial infarction)    Subjective:     HPI:  74 year old male with PMH including CAD s/p MI with stent to LAD; HTN; HLD; depression; insomnia; reports YESENIA diagnosis and has CPAP but has not routinely used  Presented to [facility] ED with CP and EKG showed STEMI  tnkase given at 2046 and he was urgently transferred here for evaluation by cardiology  Taken for [facility] after arrival at this facility  PCI of LAD stent with balloon + new LAD stent perfromed    Hospital/ICU Course:  Admitted to ICU overnight post C  Intermittent complaints of chest discomfort along with occasional reperfusion ectopy on cardiac monitor  Intermittent nausea/vomiting throughout today  12/7 - tachycardia, anxiety reported overnight; this am tachycardia, chest pressure, SOB with transition off nocturnal CPAP to NC; troponin remains > 50k    Review of Systems   Constitutional: Positive for malaise/fatigue. Negative for chills and fever.   Respiratory: Positive for shortness of breath (intermittent).    Cardiovascular: Negative for orthopnea and leg swelling.        Intermittent chest tightness   Genitourinary:        New in place   Musculoskeletal: Negative.    Neurological: Negative for dizziness and focal weakness.   Psychiatric/Behavioral: The patient is nervous/anxious.          Objective:     Vital Signs (Most Recent):  Temp: 97.9 °F (36.6 °C) (12/07/19 1105)  Pulse: (!) 113 (12/07/19 1115)  Resp: (!) 27 (12/07/19 1115)  BP: 106/75 (12/07/19 1115)  SpO2: 97 % (12/07/19 1115) Vital Signs (24h Range):  Temp:  [97.5 °F (36.4 °C)-98 °F (36.7 °C)] 97.9 °F (36.6 °C)  Pulse:  [] 113  Resp:  [15-28] 27  SpO2:  [88 %-99  %] 97 %  BP: ()/(68-92) 106/75     Weight: 73.3 kg (161 lb 9.6 oz)  Body mass index is 26.89 kg/m².      Intake/Output Summary (Last 24 hours) at 12/7/2019 1135  Last data filed at 12/7/2019 1117  Gross per 24 hour   Intake 1343.2 ml   Output 1735 ml   Net -391.8 ml       Physical Exam   Constitutional: He is oriented to person, place, and time. He appears ill. Nasal cannula in place.   HENT:   Head: Atraumatic.   Eyes: Pupils are equal, round, and reactive to light. Conjunctivae are normal.   Neck: No tracheal deviation present.   Cardiovascular: Regular rhythm. Tachycardia present.   No murmur heard.  Pulses:       Radial pulses are 2+ on the right side, and 2+ on the left side.        Dorsalis pedis pulses are 2+ on the right side, and 2+ on the left side.   Pulmonary/Chest: Effort normal. He has decreased breath sounds. He has no wheezes. He has no rales.   Abdominal: Soft. Bowel sounds are normal.   Musculoskeletal: He exhibits no edema.   Neurological: He is alert and oriented to person, place, and time.   Skin: Skin is warm and dry. Capillary refill takes less than 2 seconds.            Vents:  Oxygen Concentration (%): 50 (12/07/19 0600)    Lines/Drains/Airways     Drain                 Urethral Catheter 12/07/19 1000 Latex less than 1 day          Peripheral Intravenous Line                 Peripheral IV - Single Lumen 12/05/19 2032 18 G Right Antecubital 1 day         Peripheral IV - Single Lumen 12/05/19 2329 20 G Right Forearm 1 day                Significant Labs:    CBC/Anemia Profile:  Recent Labs   Lab 12/05/19 2319 12/06/19  0324 12/07/19  0331   WBC 12.38 10.33 14.32*   HGB 14.4 16.0 16.4   HCT 43.4 48.4 49.8    287 292   MCV 95 95 97   RDW 12.5 12.6 13.2        Chemistries:  Recent Labs   Lab 12/05/19 2319 12/06/19  0324 12/07/19  0331    138 138   K 3.8 4.8 4.6    105 105   CO2 22* 20* 21*   BUN 20 21 30*   CREATININE 1.4 1.4 1.6*   CALCIUM 8.8 9.0 9.4   ALBUMIN 3.6 3.9  3.4*   PROT 6.7 7.5 6.8   BILITOT 0.8 1.0 1.2*   ALKPHOS 81 95 89   ALT 38 107* 107*   AST 51* 470* 332*   MG  --   --  1.9       All pertinent labs within the past 24 hours have been reviewed.    12 lead EKG obtained and reviewed with cardiology    Significant Imaging:  I have reviewed all pertinent imaging results/findings within the past 24 hours.      ABG  No results for input(s): PH, PO2, PCO2, HCO3, BE in the last 168 hours.  Assessment/Plan:     Cardiac/Vascular  * STEMI (ST elevation myocardial infarction)  Post PCI  Echo shows severely reduced EF 15%, diastolic dysfunction, and elevated PA pressure  On ASA, plavix, statin, BB and ACEi  Concern for intermittent distress, continued troponin elevation, and EKG findings; discussed with cardiology - additional IV BB given; tridil infusion trial failed with hypotension; may need more aggressive support  Continue close ICU hemodynamic monitoring    Paroxysmal VT  Decreased ectopy and short runs of v tach through night; none seen to now this morning  Continue BB  Will give additional mag    GI  Nausea  None this morning, monitor    Other  YESENIA (obstructive sleep apnea)  Nocturnal CPAP ordered       Critical Care Daily Checklist:    A: Awake: RASS Goal/Actual Goal:    Actual: Musa Agitation Sedation Scale (RASS): Alert and calm   B: Spontaneous Breathing Trial Performed?     C: SAT & SBT Coordinated?  n/a                      D: Delirium: CAM-ICU Overall CAM-ICU: Negative   E: Early Mobility Performed? Yes   F: Feeding Goal:    Status:     Current Diet Order   Procedures    Diet NPO      AS: Analgesia/Sedation prn   T: Thromboembolic Prophylaxis lovenox   H: HOB > 300 Yes   U: Stress Ulcer Prophylaxis (if needed) pepcid   G: Glucose Control monitoring   B: Bowel Function     I: Indwelling Catheter (Lines & New) Necessity reviewed   D: De-escalation of Antimicrobials/Pharmacotherapies reviewed    Plan for the day/ETD ICU hemodynamic monitoring    Family/Goals  of Care: Home on discharge   I have discussed case and plan of care in detail with Dr Barnhart and Dr Andres; Status and plan of care were discussed with team on multidisciplinary rounds.    Critical Care Time: 50 minutes  Critical secondary to MI, hemodynamic instability; Critical care was time spent personally by me on the following activities: development of treatment plan with patient or surrogate and bedside caregivers, discussions with consultants, evaluation of patient's response to treatment, examination of patient, ordering and performing treatments and interventions, ordering and review of laboratory studies, ordering and review of radiographic studies, pulse oximetry, re-evaluation of patient's condition. This critical care time did not overlap with that of any other provider or involve time for any procedures.     MARIELENA Pa-BC  Critical Care Medicine  [facility]

## 2019-12-07 NOTE — PROGRESS NOTES
[facility] -   Cardiology  Progress Note    Patient Name: Raleigh Walsh  MRN: [id number]  Admission Date: 12/5/2019  Hospital Length of Stay: 2 days  Code Status: No Order   Attending Physician: Gilmar Florence MD   Primary Care Physician: Akhil Smith MD  Expected Discharge Date:   Principal Problem:STEMI (ST elevation myocardial infarction)    Subjective:   HPI    73 y/o male woth PMHx for CADHx of PCI, HTN, HLP presented to [facility] ER with anterior MI and tx with TNK. [facility] Pt initially reperfused clinically but later on with CP.  Pt taken to the cath lab for postinfarct angina.      Hospital Course:   12/6/19--Patient seen and examined in room, lying in bed. Continues to complain of intermittent chest discomfort today but much improved since PCI. He was noted to have 8-10 beats of VT on monitor at time of exam this AM. Keep in ICU for now. Labs reviewed, K+ 4.8, Cr 1.4, Troponin >50.     12/7/19--Patient seen and examined in ICU, lying in bed. Had multiple episodes of shortness of breath, palpitations with diaphoresis noted o/n. HR at time of exam 110s. Will optimize medical regimen for CAD/CHF today. Discussed with patient and family at bedside given guarded prognosis given ICM post STEMI. Will add Tridil gtt today and reassess response.     Interval History: multiple episodes of SOB, tachycardia and diaphoresis overnight and this AM. Improved with rate control and Bipap usage. Medical therapy adjusted today, will reassess for further interventions pending clinical response.     Review of Systems   Constitution: Positive for diaphoresis and malaise/fatigue.   HENT: Negative for hearing loss and hoarse voice.    Eyes: Negative for blurred vision and visual disturbance.   Cardiovascular: Positive for chest pain, irregular heartbeat, orthopnea and palpitations. Negative for claudication, dyspnea on exertion, leg swelling, near-syncope, paroxysmal nocturnal dyspnea and  syncope.   Respiratory: Positive for cough, shortness of breath and snoring. Negative for hemoptysis, sleep disturbances due to breathing and wheezing.    Endocrine: Negative for cold intolerance and heat intolerance.   Hematologic/Lymphatic: Bruises/bleeds easily.   Skin: Negative for color change, dry skin and nail changes.   Musculoskeletal: Positive for arthritis and back pain. Negative for joint pain and myalgias.   Gastrointestinal: Negative for bloating, abdominal pain, constipation, nausea and vomiting.   Genitourinary: Negative for dysuria, flank pain, hematuria and hesitancy.   Neurological: Positive for weakness. Negative for headaches, light-headedness, loss of balance, numbness and paresthesias.   Psychiatric/Behavioral: Negative for altered mental status.   Allergic/Immunologic: Negative for environmental allergies.     Objective:     Vital Signs (Most Recent):  Temp: 97.7 °F (36.5 °C) (12/07/19 0710)  Pulse: (!) 117 (12/07/19 1015)  Resp: (!) 28 (12/07/19 1015)  BP: 96/69 (12/07/19 1015)  SpO2: 97 % (12/07/19 1015) Vital Signs (24h Range):  Temp:  [97.5 °F (36.4 °C)-98 °F (36.7 °C)] 97.7 °F (36.5 °C)  Pulse:  [] 117  Resp:  [15-28] 28  SpO2:  [88 %-97 %] 97 %  BP: ()/(68-92) 96/69     Weight: 73.3 kg (161 lb 9.6 oz)  Body mass index is 26.89 kg/m².     SpO2: 97 %  O2 Device (Oxygen Therapy): nasal cannula      Intake/Output Summary (Last 24 hours) at 12/7/2019 1055  Last data filed at 12/7/2019 1000  Gross per 24 hour   Intake 1390 ml   Output 1395 ml   Net -5 ml       Lines/Drains/Airways     Drain                 Urethral Catheter 12/07/19 1000 Latex less than 1 day          Peripheral Intravenous Line                 Peripheral IV - Single Lumen 12/05/19 2032 18 G Right Antecubital 1 day         Peripheral IV - Single Lumen 12/05/19 2035 18 G Left Antecubital 1 day         Peripheral IV - Single Lumen 12/05/19 2329 20 G Right Forearm 1 day                Physical Exam   Constitutional:  He is oriented to person, place, and time. He appears well-developed and well-nourished. No distress.   HENT:   Head: Normocephalic and atraumatic.   Eyes: Pupils are equal, round, and reactive to light.   Neck: Normal range of motion and full passive range of motion without pain. Neck supple. No JVD present. No tracheal deviation present. No thyromegaly present.   Cardiovascular: Regular rhythm, S1 normal, S2 normal and intact distal pulses. Frequent extrasystoles are present. Tachycardia present. PMI is not displaced. Exam reveals no distant heart sounds.   No murmur heard.  Pulses:       Radial pulses are 1+ on the right side, and 1+ on the left side.        Dorsalis pedis pulses are 1+ on the right side, and 1+ on the left side.   Right groin access site C/D/I, no hematoma or ecchymosis noted. No drainage or signs of infection noted.    Pulmonary/Chest: Accessory muscle usage present. No respiratory distress. He has decreased breath sounds in the right lower field and the left lower field. He has no wheezes. He has no rales.   +multiple episodes of SOB, BECERRA overnight and required bipap   Abdominal: Soft. Bowel sounds are normal. He exhibits no distension. There is no tenderness.   Musculoskeletal: Normal range of motion. He exhibits no edema.        Right ankle: He exhibits no swelling.        Left ankle: He exhibits no swelling.   Neurological: He is alert and oriented to person, place, and time.   Skin: Skin is warm. He is diaphoretic. No cyanosis. There is pallor. Nails show no clubbing.   Psychiatric: He has a normal mood and affect. His speech is normal and behavior is normal. Judgment and thought content normal. Cognition and memory are normal.   Nursing note and vitals reviewed.      Significant Labs:   BMP:   Recent Labs   Lab 12/05/19  2319 12/06/19  0324 12/07/19  0331   * 170* 142*    138 138   K 3.8 4.8 4.6    105 105   CO2 22* 20* 21*   BUN 20 21 30*   CREATININE 1.4 1.4 1.6*    CALCIUM 8.8 9.0 9.4   MG  --   --  1.9   , CBC   Recent Labs   Lab 12/05/19  2319 12/06/19  0324 12/07/19  0331   WBC 12.38 10.33 14.32*   HGB 14.4 16.0 16.4   HCT 43.4 48.4 49.8    287 292   , Lipid Panel   Recent Labs   Lab 12/07/19  0331   CHOL 137   HDL 36*   LDLCALC 62.2*   TRIG 194*   CHOLHDL 26.3   , Troponin   Recent Labs   Lab 12/06/19  1329 12/06/19  2045 12/07/19  0331   TROPONINI >50.000* >50.000* >50.000*    and All pertinent lab results from the last 24 hours have been reviewed.    Significant Imaging: Echocardiogram:   2D echo with color flow doppler:   Results for orders placed or performed during the hospital encounter of 12/05/19   2D echo with color flow doppler   Result Value Ref Range    QEF 15 (A) 55 - 65    Mitral Valve Regurgitation TRIVIAL     Diastolic Dysfunction Yes (A)     Est. PA Systolic Pressure 43.05 (A)     Narrative    Date of Procedure: 12/06/2019        TEST DESCRIPTION   Technical Quality: This is a technically challenging study.     Aorta: The aortic root is normal in size, measuring 2.4 cm at sinotubular junction and 3.0 cm at Sinuses of Valsalva. The proximal ascending aorta is normal in size, measuring 2.5 cm across.     Left Atrium: The left atrial volume index is normal, measuring 26.71 cc/m2.     Left Ventricle: The left ventricle is normal in size, with an end-diastolic diameter of 5.0 cm, and an end-systolic diameter of 4.3 cm. LV wall thickness is normal, with the septum measuring 1.0 cm and the posterior wall measuring 0.9 cm across. Relative   wall thickness was normal at 0.36, and the LV mass index was 110.0 g/m2 consistent with normal left ventricular mass. The following segments were akinetic: mid anteroseptum, apical septum, mid inferoseptum, apical lateral wall, apical inferior wall.  The following segments were moderately hypokinetic: mid anterolateral wall, mid anterior wall.  The following segments were severely hypokinetic: basal anteroseptum, basal  inferoseptum, apical anterior wall.  Left ventricular systolic function appears severely depressed. Visually estimated ejection fraction is 15-20%. The LV Doppler derived stroke volume equals 26.0 ccs.     Diastolic indices: E wave velocity 0.7 m/s, E/A ratio 1.3,  msec., E/e' ratio(avg) 11. There is diastolic dysfunction secondary to relaxation abnormality.     Right Atrium: The right atrium is normal in size, measuring 3.5 cm in length and 3.0 cm in width in the apical view.     Right Ventricle: The right ventricle is normal in size. Global right ventricular systolic function appears normal. Tricuspid annular plane systolic excursion (TAPSE) is 1.7 cm. The estimated PA systolic pressure is 43 mmHg.     Aortic Valve:  The aortic valve is normal in structure. The mean gradient obtained across the aortic valve is 1 mmHg.     Mitral Valve:  The mitral valve is normal in structure. The pressure half time is 50 msec. The calculated mitral valve area is 4.4 cm2. There is trivial mitral regurgitation.     Tricuspid Valve:  The tricuspid valve is normal in structure.     Pulmonary Valve:  The pulmonic valve is not well seen.     IVC: IVC is enlarged but collapses > 50% with a sniff, suggesting intermediate right atrial pressure of 8 mmHg.     Intracavitary: There is no evidence of pericardial effusion, intracavity mass, thrombi, or vegetation.         CONCLUSIONS     1 - Wall motion abnormalities.     2 - Severely depressed left ventricular systolic function (EF 15-20%).     3 - Impaired LV relaxation, normal LAP (grade 1 diastolic dysfunction).     4 - Normal right ventricular systolic function .     5 - Pulmonary hypertension. The estimated PA systolic pressure is 43 mmHg.     6 - Trivial mitral regurgitation.     7 - Intermediate central venous pressure.             This document has been electronically    SIGNED BY: Lamont Andres MD On: 12/06/2019 17:22    and X-Ray: CXR: X-Ray Chest 1 View (CXR): No results found  for this visit on 12/05/19. and X-Ray Chest PA and Lateral (CXR): No results found for this visit on 12/05/19.    Assessment and Plan:       * STEMI (ST elevation myocardial infarction)  75 y/o male woth PMHx for CADHx of PCI, HTN, HLP presented to [facility] ER with anterior MI and tx with TNK. Pt transferred to [facility]. Pt initially reperfused clinically but later on with CP.  Pt taken to the cath lab for postinfarct angina.    12/6/19  -s/p PCI of LAD per Dr. Alcazar  -ECHO pending  -Continue ASA, Statin, BB, ACEi, Plavix  -Check FLP  -Keep in ICU for today  -Dash diet, 2 gm sodium restriction  -1.5L Fluid restriction  -Repeat labs in AM    12/7/19  -Echo revealed EF 15-20%, +WMA's, PHTN, DD  -Add Tridil gtt today  -Stop ACEi today given need for Tridil gtt for symptom management   -Continue ASA, Statin, Plavix, BB, IV lasix BID, Tridil gtt  -Further recs to follow pending clinical course    YESENIA (obstructive sleep apnea)  -continue nightly cpap    Paroxysmal VT  Continue BB  Keep K+ >4 and Mag >2  Continue telemetry monitoring  Keep in ICU for now    12/7  -continue BB    Mixed hyperlipidemia  Continue statin  Check FLP        VTE Risk Mitigation (From admission, onward)         Ordered     enoxaparin injection 40 mg  Daily      12/06/19 0910                Ana Woodard, JONAH  Cardiology  [facility]

## 2019-12-07 NOTE — ASSESSMENT & PLAN NOTE
Continue BB  Keep K+ >4 and Mag >2  Continue telemetry monitoring  Keep in ICU for now    12/7  -continue BB

## 2019-12-07 NOTE — PLAN OF CARE
Pt placed on continuous BIPAP with documented settings. Pt tolerating well, but remains tachypneic.

## 2019-12-07 NOTE — ASSESSMENT & PLAN NOTE
Decreased ectopy and short runs of v tach through night; none seen to now this morning  Continue BB  Will give additional mag

## 2019-12-07 NOTE — NURSING
Pt having episodes of desaturation and refuses CPAP.  Pt educated on importance of wearing CPAP and desaturation.

## 2019-12-07 NOTE — SUBJECTIVE & OBJECTIVE
Interval History:   Pt c/o new onset SOB on minimal exertion . Denies chest pain.   Remains on O2 at 5L/min. Pt is noted to be tachycardic and tachypnic.   Episodes of nausea and emesis x 3 yesterday but none today   EKG revealed sinus tachycardia , ST elevation ant and lat leads and no longer PVC's   Echo revealed severely depressed left vent systolic function with EF of 15-20%, PA pressure 43.   Cardiology is recommending diuresis with IV furosemide , continue BB and start Tridil drip.   Prognosis is guarded at this time.     Review of Systems   Constitutional: Positive for activity change, appetite change and fatigue. Negative for fever.   HENT: Negative for sore throat.    Eyes: Negative for visual disturbance.   Respiratory: Positive for shortness of breath and wheezing. Negative for cough and chest tightness.    Cardiovascular: Negative for chest pain, palpitations and leg swelling.   Gastrointestinal: Positive for nausea (better ) and vomiting (better today ). Negative for abdominal distention, abdominal pain, constipation and diarrhea.   Endocrine: Negative for polyuria.   Genitourinary: Negative for decreased urine volume, dysuria, flank pain, frequency and hematuria.   Musculoskeletal: Negative for back pain and gait problem.   Skin: Negative for rash.   Neurological: Negative for syncope, speech difficulty, weakness, light-headedness and headaches.   Psychiatric/Behavioral: Positive for sleep disturbance. Negative for confusion and hallucinations.     Objective:     Vital Signs (Most Recent):  Temp: 97.9 °F (36.6 °C) (12/07/19 1105)  Pulse: 99 (12/07/19 1200)  Resp: (!) 24 (12/07/19 1200)  BP: 101/67 (12/07/19 1200)  SpO2: (!) 90 % (12/07/19 1200) Vital Signs (24h Range):  Temp:  [97.5 °F (36.4 °C)-98 °F (36.7 °C)] 97.9 °F (36.6 °C)  Pulse:  [] 99  Resp:  [16-28] 24  SpO2:  [88 %-99 %] 90 %  BP: ()/(67-92) 101/67     Weight: 73.3 kg (161 lb 9.6 oz)  Body mass index is 26.89  kg/m².    Intake/Output Summary (Last 24 hours) at 12/7/2019 1257  Last data filed at 12/7/2019 1200  Gross per 24 hour   Intake 968.2 ml   Output 1835 ml   Net -866.8 ml      Physical Exam   Constitutional: He is oriented to person, place, and time. He appears well-developed and well-nourished. No distress.   HENT:   Head: Normocephalic and atraumatic.   Mouth/Throat: No oropharyngeal exudate.   Eyes: Pupils are equal, round, and reactive to light. Conjunctivae and EOM are normal.   Neck: Normal range of motion. Neck supple. No JVD present. No thyromegaly present.   Cardiovascular: Regular rhythm and normal heart sounds.   No murmur heard.  Tachycardia    Pulmonary/Chest: Effort normal. No respiratory distress. He has wheezes. He has rales. He exhibits no tenderness.   NC in place    Abdominal: Soft. Bowel sounds are normal. He exhibits no distension. There is no tenderness. There is no rebound and no guarding.   Musculoskeletal: Normal range of motion. He exhibits no edema.   Lymphadenopathy:     He has no cervical adenopathy.   Neurological: He is alert and oriented to person, place, and time. He displays normal reflexes. No cranial nerve deficit or sensory deficit.   Skin: Skin is warm and dry. No rash noted. He is not diaphoretic.   Psychiatric: He has a normal mood and affect.       Significant Labs:   BMP:   Recent Labs   Lab 12/07/19 0331   *      K 4.6      CO2 21*   BUN 30*   CREATININE 1.6*   CALCIUM 9.4   MG 1.9     CBC:   Recent Labs   Lab 12/05/19 2319 12/06/19 0324 12/07/19  0331   WBC 12.38 10.33 14.32*   HGB 14.4 16.0 16.4   HCT 43.4 48.4 49.8    287 292     CMP:   Recent Labs   Lab 12/05/19 2319 12/06/19 0324 12/07/19  0331    138 138   K 3.8 4.8 4.6    105 105   CO2 22* 20* 21*   * 170* 142*   BUN 20 21 30*   CREATININE 1.4 1.4 1.6*   CALCIUM 8.8 9.0 9.4   PROT 6.7 7.5 6.8   ALBUMIN 3.6 3.9 3.4*   BILITOT 0.8 1.0 1.2*   ALKPHOS 81 95 89   AST 51*  470* 332*   ALT 38 107* 107*   ANIONGAP 11 13 12   EGFRNONAA 49* 49* 42*       Significant Imaging:   X-Ray Chest AP Portable [[id number]] Resulted: 12/06/19 1313   Order Status: Completed Updated: 12/06/19 1316   Narrative:     EXAMINATION:  XR CHEST AP PORTABLE    CLINICAL HISTORY:  chest pain;    FINDINGS:  Single view of the chest.    Cardiac silhouette is borderline enlarged.  Mild pulmonary vascular congestion and interstitial edema suspected.  No consolidation.  No evidence of pleural effusion or pneumothorax.  Bones demonstrate degenerative changes.   Impression:       Mild pulmonary vascular congestion and interstitial edema suspected. No consolidation     Echocardiogram- 12/6/19-  CONCLUSIONS     1 - Wall motion abnormalities.     2 - Severely depressed left ventricular systolic function (EF 15-20%).     3 - Impaired LV relaxation, normal LAP (grade 1 diastolic dysfunction).     4 - Normal right ventricular systolic function .     5 - Pulmonary hypertension. The estimated PA systolic pressure is 43 mmHg.     6 - Trivial mitral regurgitation.     7 - Intermediate central venous pressure

## 2019-12-07 NOTE — ASSESSMENT & PLAN NOTE
75 y/o male woth PMHx for CADHx of PCI, HTN, HLP presented to [facility] ER with anterior MI and tx with TNK. Pt transferred to [facility]. Pt initially reperfused clinically but later on with CP.  Pt taken to the cath lab for postinfarct angina.    12/6/19  -s/p PCI of LAD per Dr. Alcazar  -ECHO pending  -Continue ASA, Statin, BB, ACEi, Plavix  -Check FLP  -Keep in ICU for today  -Dash diet, 2 gm sodium restriction  -1.5L Fluid restriction  -Repeat labs in AM    12/7/19  -Echo revealed EF 15-20%, +WMA's, PHTN, DD  -Add Tridil gtt today  -Stop ACEi today given need for Tridil gtt for symptom management   -Continue ASA, Statin, Plavix, BB, IV lasix BID, Tridil gtt  -Further recs to follow pending clinical course

## 2019-12-07 NOTE — PROGRESS NOTES
Pt c/o SOB and hot again.  Pt denies chest pain.   RR 30.  NP made aware and ordered to give another 2.5 IVP metoprolol.  Metoprolol given at this time and HR down to 108 and pt stating SOB is getting better.  Will continue to monitor closely.

## 2019-12-07 NOTE — PLAN OF CARE
Pt aaox3.  Pt is NSR/ST on the heart monitor with frequent PVCs with runs of MD dominique aware.  Echo today showed EF 15%.  Pt has not had any CP today.  Only complaint is 4 episodes of emesis, prn zofran and phenergan given.  Resp: sats 88-92% on 5L NC.  : pt voiding per urinal with minimal UOP, lasix given and pt only urinated 250ml, total UOP this shift 500ml. Skin: right groin site CDI.  Pt turned and repositioned with use of pillows.  Bed low, wheels locked, alarms audible.  Plan of care reviewed with pt and family.  Pt and family verbalizes understanding. Will continue to monitor.

## 2019-12-07 NOTE — PROGRESS NOTES
Pt SOB, tachycardic, tachypneic, and anxious.  , RR 35.  Cardiology notified via secure chat and Bronwyn WALDEN notified.    1425-Pt placed on bipap at this time by Bronwyn WALDEN and adjusted settings.  1430-Dmitry WALDEN at bedside and stated to give metoprolol 2.5 if HR stays in the 120s.   1435- BP stable and RR 27 on bipap and pt resting comfortably.  Will Dmitry WALDEN stated will do bedside echo and notify him when tech at bedside.  Will continue to monitor closely.  Wife at bedside.

## 2019-12-07 NOTE — PLAN OF CARE
POC reviewed.  AAOx4. Follows commands. BRUNO. Respirations even, nonlabored on CPAP 50%; 5L NC when not on CPAP.  Pt had anxious episode after refusing to put CPAP on; HR elevated and very anxious; pt asked to be placed back on CPAP; tolerating well.  Voids per urinal.  Denies any pain or needs at this time.  No acute distress noted.  No nausea or vomiting this shift.  Call bell and bedside table within reach. SR upx2.

## 2019-12-07 NOTE — PROGRESS NOTES
Pt c/o SOB and anxious.  Pt denies chest pain.   RR 35.  NP aware and stated to give 2.5 mg Metoprolol d/t bp drops with metoprolol.  HR improved down to 105 and pt stating SOB is getting better.  Dmitry WALDEN notified and at bedside.  MD stated to not given any more metoprolol, only put nitro back on if pt has chest pain, he will change lasix order to be TID and will order something for anxiety.  Pt currently resting comfortly with HOB elevated, HR 94, RR 23 and BP 94/70 (77).  Will continue to monitor closely.

## 2019-12-07 NOTE — PROGRESS NOTES
Pt anxious and complaining of SOB.  .  SUNNY Coon NP at bedside.  EKG done and shown to SUNNY Coon NP.  Pt given ordered 2.5 metoprolol.  HR improved to 105 and SOB resolved.  Will continue to monitor.

## 2019-12-07 NOTE — PROGRESS NOTES
[facility] Hospital Medicine  Progress Note    Patient Name: Raleigh Walsh  MRN: [id number]  Patient Class: IP- Inpatient   Admission Date: 12/5/2019  Length of Stay: 2 days  Attending Physician: Gilmar Florence MD  Primary Care Provider: Akhil Smith MD        Subjective:     Principal Problem:STEMI (ST elevation myocardial infarction)        HPI:  75 YO WM with known CAD S/P MI and prior stenting;  Presented to ED at [facility] after experiencing chest pain while at a ballgame; He ruled in foe a STEMI; was treated with TPA and transferred here; He was taken to the cath lab where he underwent PCI with stent placed to the proximal LAD by Dr. Alcazar.  He was transferred to the ICU in stable condition.    Overview/Hospital Course:  12/7/19-  Pt c/o new onset SOB on minimal exertion . Denies chest pain.   Remains on O2 at 5L/min. Pt is noted to be tachycardic and tachypnic.   Episodes of nausea and emesis x 3 yesterday but none today   EKG revealed sinus tachycardia , ST elevation ant and lat leads and no longer PVC's   Echo revealed severely depressed left vent systolic function with EF of 15-20%, PA pressure 43.   Cardiology is recommending diuresis with IV furosemide , continue BB and start Tridil drip.   Prognosis is guarded at this time.       Interval History:   Pt c/o new onset SOB on minimal exertion . Denies chest pain.   Remains on O2 at 5L/min. Pt is noted to be tachycardic and tachypnic.   Episodes of nausea and emesis x 3 yesterday but none today   EKG revealed sinus tachycardia , ST elevation ant and lat leads and no longer PVC's   Echo revealed severely depressed left vent systolic function with EF of 15-20%, PA pressure 43.   Cardiology is recommending diuresis with IV furosemide , continue BB and start Tridil drip.   Prognosis is guarded at this time.     Review of Systems   Constitutional: Positive for activity change, appetite change and fatigue. Negative for fever.   HENT:  Negative for sore throat.    Eyes: Negative for visual disturbance.   Respiratory: Positive for shortness of breath and wheezing. Negative for cough and chest tightness.    Cardiovascular: Negative for chest pain, palpitations and leg swelling.   Gastrointestinal: Positive for nausea (better ) and vomiting (better today ). Negative for abdominal distention, abdominal pain, constipation and diarrhea.   Endocrine: Negative for polyuria.   Genitourinary: Negative for decreased urine volume, dysuria, flank pain, frequency and hematuria.   Musculoskeletal: Negative for back pain and gait problem.   Skin: Negative for rash.   Neurological: Negative for syncope, speech difficulty, weakness, light-headedness and headaches.   Psychiatric/Behavioral: Positive for sleep disturbance. Negative for confusion and hallucinations.     Objective:     Vital Signs (Most Recent):  Temp: 97.9 °F (36.6 °C) (12/07/19 1105)  Pulse: 99 (12/07/19 1200)  Resp: (!) 24 (12/07/19 1200)  BP: 101/67 (12/07/19 1200)  SpO2: (!) 90 % (12/07/19 1200) Vital Signs (24h Range):  Temp:  [97.5 °F (36.4 °C)-98 °F (36.7 °C)] 97.9 °F (36.6 °C)  Pulse:  [] 99  Resp:  [16-28] 24  SpO2:  [88 %-99 %] 90 %  BP: ()/(67-92) 101/67     Weight: 73.3 kg (161 lb 9.6 oz)  Body mass index is 26.89 kg/m².    Intake/Output Summary (Last 24 hours) at 12/7/2019 1257  Last data filed at 12/7/2019 1200  Gross per 24 hour   Intake 968.2 ml   Output 1835 ml   Net -866.8 ml      Physical Exam   Constitutional: He is oriented to person, place, and time. He appears well-developed and well-nourished. No distress.   HENT:   Head: Normocephalic and atraumatic.   Mouth/Throat: No oropharyngeal exudate.   Eyes: Pupils are equal, round, and reactive to light. Conjunctivae and EOM are normal.   Neck: Normal range of motion. Neck supple. No JVD present. No thyromegaly present.   Cardiovascular: Regular rhythm and normal heart sounds.   No murmur heard.  Tachycardia     Pulmonary/Chest: Effort normal. No respiratory distress. He has wheezes. He has rales. He exhibits no tenderness.   NC in place    Abdominal: Soft. Bowel sounds are normal. He exhibits no distension. There is no tenderness. There is no rebound and no guarding.   Musculoskeletal: Normal range of motion. He exhibits no edema.   Lymphadenopathy:     He has no cervical adenopathy.   Neurological: He is alert and oriented to person, place, and time. He displays normal reflexes. No cranial nerve deficit or sensory deficit.   Skin: Skin is warm and dry. No rash noted. He is not diaphoretic.   Psychiatric: He has a normal mood and affect.       Significant Labs:   BMP:   Recent Labs   Lab 12/07/19  0331   *      K 4.6      CO2 21*   BUN 30*   CREATININE 1.6*   CALCIUM 9.4   MG 1.9     CBC:   Recent Labs   Lab 12/05/19  2319 12/06/19  0324 12/07/19  0331   WBC 12.38 10.33 14.32*   HGB 14.4 16.0 16.4   HCT 43.4 48.4 49.8    287 292     CMP:   Recent Labs   Lab 12/05/19  2319 12/06/19  0324 12/07/19  0331    138 138   K 3.8 4.8 4.6    105 105   CO2 22* 20* 21*   * 170* 142*   BUN 20 21 30*   CREATININE 1.4 1.4 1.6*   CALCIUM 8.8 9.0 9.4   PROT 6.7 7.5 6.8   ALBUMIN 3.6 3.9 3.4*   BILITOT 0.8 1.0 1.2*   ALKPHOS 81 95 89   AST 51* 470* 332*   ALT 38 107* 107*   ANIONGAP 11 13 12   EGFRNONAA 49* 49* 42*       Significant Imaging:   X-Ray Chest AP Portable [[id number]] Resulted: 12/06/19 1313   Order Status: Completed Updated: 12/06/19 1316   Narrative:     EXAMINATION:  XR CHEST AP PORTABLE    CLINICAL HISTORY:  chest pain;    FINDINGS:  Single view of the chest.    Cardiac silhouette is borderline enlarged.  Mild pulmonary vascular congestion and interstitial edema suspected.  No consolidation.  No evidence of pleural effusion or pneumothorax.  Bones demonstrate degenerative changes.   Impression:       Mild pulmonary vascular congestion and interstitial edema suspected. No  consolidation     Echocardiogram- 12/6/19-  CONCLUSIONS     1 - Wall motion abnormalities.     2 - Severely depressed left ventricular systolic function (EF 15-20%).     3 - Impaired LV relaxation, normal LAP (grade 1 diastolic dysfunction).     4 - Normal right ventricular systolic function .     5 - Pulmonary hypertension. The estimated PA systolic pressure is 43 mmHg.     6 - Trivial mitral regurgitation.     7 - Intermediate central venous pressure      Assessment/Plan:      * STEMI (ST elevation myocardial infarction)  STEMI   S/p PCI with JOSE to proximal LAD (See operative Note)  Continue ASA/Statin/BB/Plavix  Topical nitrites;  Supplemental oxygen;   Consider CPAP for desaturations  Pain relief measures: hydrocodone and Prn morphine  Continue as per Cardiology;  Trend troponins      Acute systolic congestive heart failure, NYHA class 3  - New onset Post -MI   -Continue Diuresis   -Continue BB  -ACEI is discontinued by Cardiology due to hypotension or marginal BP   -Close monitoring in ICU       Elevated LFTs  -Likely hepatic congestion due to acute CHF.   -Continue Diuresis       Paroxysmal VT  - Likely reperfusion arrhythmia   -continue BB      PENNY (acute kidney injury)  -Baseline creatinine 1.1 to 1.2  -Monitor Renal indices       YESENIA (obstructive sleep apnea)  -Encouraged nightly C-PAP        VTE Risk Mitigation (From admission, onward)         Ordered     enoxaparin injection 40 mg  Daily      12/06/19 0910                Critical care time spent on the evaluation and treatment of severe organ dysfunction, review of pertinent labs and imaging studies, discussions with consulting providers and discussions with patient/family: 30 minutes.      Gilmar Florence MD  Department of Hospital Medicine   [facility]

## 2019-12-07 NOTE — ASSESSMENT & PLAN NOTE
- New onset Post -MI   -Continue Diuresis   -Continue BB  -ACEI is discontinued by Cardiology due to hypotension or marginal BP   -Close monitoring in ICU

## 2019-12-07 NOTE — SUBJECTIVE & OBJECTIVE
Interval History: multiple episodes of SOB, tachycardia and diaphoresis overnight and this AM. Improved with rate control and Bipap usage. Medical therapy adjusted today, will reassess for further interventions pending clinical response.     Review of Systems   Constitution: Positive for diaphoresis and malaise/fatigue.   HENT: Negative for hearing loss and hoarse voice.    Eyes: Negative for blurred vision and visual disturbance.   Cardiovascular: Positive for chest pain, irregular heartbeat, orthopnea and palpitations. Negative for claudication, dyspnea on exertion, leg swelling, near-syncope, paroxysmal nocturnal dyspnea and syncope.   Respiratory: Positive for cough, shortness of breath and snoring. Negative for hemoptysis, sleep disturbances due to breathing and wheezing.    Endocrine: Negative for cold intolerance and heat intolerance.   Hematologic/Lymphatic: Bruises/bleeds easily.   Skin: Negative for color change, dry skin and nail changes.   Musculoskeletal: Positive for arthritis and back pain. Negative for joint pain and myalgias.   Gastrointestinal: Negative for bloating, abdominal pain, constipation, nausea and vomiting.   Genitourinary: Negative for dysuria, flank pain, hematuria and hesitancy.   Neurological: Positive for weakness. Negative for headaches, light-headedness, loss of balance, numbness and paresthesias.   Psychiatric/Behavioral: Negative for altered mental status.   Allergic/Immunologic: Negative for environmental allergies.     Objective:     Vital Signs (Most Recent):  Temp: 97.7 °F (36.5 °C) (12/07/19 0710)  Pulse: (!) 117 (12/07/19 1015)  Resp: (!) 28 (12/07/19 1015)  BP: 96/69 (12/07/19 1015)  SpO2: 97 % (12/07/19 1015) Vital Signs (24h Range):  Temp:  [97.5 °F (36.4 °C)-98 °F (36.7 °C)] 97.7 °F (36.5 °C)  Pulse:  [] 117  Resp:  [15-28] 28  SpO2:  [88 %-97 %] 97 %  BP: ()/(68-92) 96/69     Weight: 73.3 kg (161 lb 9.6 oz)  Body mass index is 26.89 kg/m².     SpO2: 97  %  O2 Device (Oxygen Therapy): nasal cannula      Intake/Output Summary (Last 24 hours) at 12/7/2019 1055  Last data filed at 12/7/2019 1000  Gross per 24 hour   Intake 1390 ml   Output 1395 ml   Net -5 ml       Lines/Drains/Airways     Drain                 Urethral Catheter 12/07/19 1000 Latex less than 1 day          Peripheral Intravenous Line                 Peripheral IV - Single Lumen 12/05/19 2032 18 G Right Antecubital 1 day         Peripheral IV - Single Lumen 12/05/19 2035 18 G Left Antecubital 1 day         Peripheral IV - Single Lumen 12/05/19 2329 20 G Right Forearm 1 day                Physical Exam   Constitutional: He is oriented to person, place, and time. He appears well-developed and well-nourished. No distress.   HENT:   Head: Normocephalic and atraumatic.   Eyes: Pupils are equal, round, and reactive to light.   Neck: Normal range of motion and full passive range of motion without pain. Neck supple. No JVD present. No tracheal deviation present. No thyromegaly present.   Cardiovascular: Regular rhythm, S1 normal, S2 normal and intact distal pulses. Frequent extrasystoles are present. Tachycardia present. PMI is not displaced. Exam reveals no distant heart sounds.   No murmur heard.  Pulses:       Radial pulses are 1+ on the right side, and 1+ on the left side.        Dorsalis pedis pulses are 1+ on the right side, and 1+ on the left side.   Right groin access site C/D/I, no hematoma or ecchymosis noted. No drainage or signs of infection noted.    Pulmonary/Chest: Accessory muscle usage present. No respiratory distress. He has decreased breath sounds in the right lower field and the left lower field. He has no wheezes. He has no rales.   +multiple episodes of SOB, BECERRA overnight and required bipap   Abdominal: Soft. Bowel sounds are normal. He exhibits no distension. There is no tenderness.   Musculoskeletal: Normal range of motion. He exhibits no edema.        Right ankle: He exhibits no  swelling.        Left ankle: He exhibits no swelling.   Neurological: He is alert and oriented to person, place, and time.   Skin: Skin is warm. He is diaphoretic. No cyanosis. There is pallor. Nails show no clubbing.   Psychiatric: He has a normal mood and affect. His speech is normal and behavior is normal. Judgment and thought content normal. Cognition and memory are normal.   Nursing note and vitals reviewed.      Significant Labs:   BMP:   Recent Labs   Lab 12/05/19 2319 12/06/19  0324 12/07/19  0331   * 170* 142*    138 138   K 3.8 4.8 4.6    105 105   CO2 22* 20* 21*   BUN 20 21 30*   CREATININE 1.4 1.4 1.6*   CALCIUM 8.8 9.0 9.4   MG  --   --  1.9   , CBC   Recent Labs   Lab 12/05/19  2319 12/06/19 0324 12/07/19  0331   WBC 12.38 10.33 14.32*   HGB 14.4 16.0 16.4   HCT 43.4 48.4 49.8    287 292   , Lipid Panel   Recent Labs   Lab 12/07/19  0331   CHOL 137   HDL 36*   LDLCALC 62.2*   TRIG 194*   CHOLHDL 26.3   , Troponin   Recent Labs   Lab 12/06/19  1329 12/06/19  2045 12/07/19  0331   TROPONINI >50.000* >50.000* >50.000*    and All pertinent lab results from the last 24 hours have been reviewed.    Significant Imaging: Echocardiogram:   2D echo with color flow doppler:   Results for orders placed or performed during the hospital encounter of 12/05/19   2D echo with color flow doppler   Result Value Ref Range    QEF 15 (A) 55 - 65    Mitral Valve Regurgitation TRIVIAL     Diastolic Dysfunction Yes (A)     Est. PA Systolic Pressure 43.05 (A)     Narrative    Date of Procedure: 12/06/2019        TEST DESCRIPTION   Technical Quality: This is a technically challenging study.     Aorta: The aortic root is normal in size, measuring 2.4 cm at sinotubular junction and 3.0 cm at Sinuses of Valsalva. The proximal ascending aorta is normal in size, measuring 2.5 cm across.     Left Atrium: The left atrial volume index is normal, measuring 26.71 cc/m2.     Left Ventricle: The left ventricle  is normal in size, with an end-diastolic diameter of 5.0 cm, and an end-systolic diameter of 4.3 cm. LV wall thickness is normal, with the septum measuring 1.0 cm and the posterior wall measuring 0.9 cm across. Relative   wall thickness was normal at 0.36, and the LV mass index was 110.0 g/m2 consistent with normal left ventricular mass. The following segments were akinetic: mid anteroseptum, apical septum, mid inferoseptum, apical lateral wall, apical inferior wall.  The following segments were moderately hypokinetic: mid anterolateral wall, mid anterior wall.  The following segments were severely hypokinetic: basal anteroseptum, basal inferoseptum, apical anterior wall.  Left ventricular systolic function appears severely depressed. Visually estimated ejection fraction is 15-20%. The LV Doppler derived stroke volume equals 26.0 ccs.     Diastolic indices: E wave velocity 0.7 m/s, E/A ratio 1.3,  msec., E/e' ratio(avg) 11. There is diastolic dysfunction secondary to relaxation abnormality.     Right Atrium: The right atrium is normal in size, measuring 3.5 cm in length and 3.0 cm in width in the apical view.     Right Ventricle: The right ventricle is normal in size. Global right ventricular systolic function appears normal. Tricuspid annular plane systolic excursion (TAPSE) is 1.7 cm. The estimated PA systolic pressure is 43 mmHg.     Aortic Valve:  The aortic valve is normal in structure. The mean gradient obtained across the aortic valve is 1 mmHg.     Mitral Valve:  The mitral valve is normal in structure. The pressure half time is 50 msec. The calculated mitral valve area is 4.4 cm2. There is trivial mitral regurgitation.     Tricuspid Valve:  The tricuspid valve is normal in structure.     Pulmonary Valve:  The pulmonic valve is not well seen.     IVC: IVC is enlarged but collapses > 50% with a sniff, suggesting intermediate right atrial pressure of 8 mmHg.     Intracavitary: There is no evidence of  pericardial effusion, intracavity mass, thrombi, or vegetation.         CONCLUSIONS     1 - Wall motion abnormalities.     2 - Severely depressed left ventricular systolic function (EF 15-20%).     3 - Impaired LV relaxation, normal LAP (grade 1 diastolic dysfunction).     4 - Normal right ventricular systolic function .     5 - Pulmonary hypertension. The estimated PA systolic pressure is 43 mmHg.     6 - Trivial mitral regurgitation.     7 - Intermediate central venous pressure.             This document has been electronically    SIGNED BY: Lamont Andres MD On: 12/06/2019 17:22    and X-Ray: CXR: X-Ray Chest 1 View (CXR): No results found for this visit on 12/05/19. and X-Ray Chest PA and Lateral (CXR): No results found for this visit on 12/05/19.

## 2019-12-07 NOTE — ASSESSMENT & PLAN NOTE
Post PCI  Echo shows severely reduced EF 15%, diastolic dysfunction, and elevated PA pressure  On ASA, plavix, statin, BB and ACEi  Concern for intermittent distress, continued troponin elevation, and EKG findings; discussed with cardiology - additional IV BB given; tridil infusion trial failed with hypotension; may need more aggressive support  Continue close ICU hemodynamic monitoring

## 2019-12-07 NOTE — HOSPITAL COURSE
12/7/19-  Pt c/o new onset SOB on minimal exertion . Denies chest pain.   Remains on O2 at 5L/min. Pt is noted to be tachycardic and tachypnic.   Episodes of nausea and emesis x 3 yesterday but none today   EKG revealed sinus tachycardia , ST elevation ant and lat leads and no longer PVC's   Echo revealed severely depressed left vent systolic function with EF of 15-20%, PA pressure 43.   Cardiology is recommending diuresis with IV furosemide , continue BB and start Tridil drip.   Prognosis is guarded at this time.   12/8/19-  Pt is seen at bedside . Remains mildly tachycardic and tachypnic .  Reports SOB is better today . Good urine output noted with diuresis 1.5 L yesterday   Remains on O2 at 5L/min. Tolerated BiPAP well last night.     Tridil drip discontinued yesterday due to hypotension   Remains on low dose BB and Ramipril restarted at 2.5 mg daily   Prognosis guarded.     12/9/19-  Overnight developed Atrial flutter and started on Amiodarone ggt and Heparin ggt. Amiodarone bolus was not given due to marginal BP.   On BB . Rate is fairly controlled.  EKG revealed persistent ST elevation anterior leads     Remains on O2 at 5 L/min via NC and nightly BiPAP  Good urine output 1.3 L yesterday   Leukocytosis has resolved . Blood cultures are negative     Pt denies chest pain . Reports SOB is better at rest     12/10/19-  Pt feels fair. Denies chest pain though reports some chest tightness today . This is not like when he had MI. Reports SOB is better at rest. Has not moved from bed yet. Wears BiPAP at night. Now on NC at 4L/min.   Converted to sinus rhythm noted in EKG today . Persistent mild ST elevation noted ant leads   Amiodarone switched to pill  Remains on Heparin drip  On Lasix for diuresis . Urine output is good.  Renal indices are stable   Leukocytosis has resolved   12/11/19-  Remains on O2 at 4L/min.  Nightly BiPAP  Still c/o some SOB . Intermittent A.fib overnight and  had an episode of chest discomfort  last night after eating ice cream  per nursing staff.  This morning pt denies chest pain but endorses SOB . Good diuresis is noted.   On Oral amiodarone . Started on NOAC by Cardiology today.  BB as BP permits   Lasix dose adjusted to IV daily per Cardiology   On Ranolazine   PT is seeing pt and is able to participate    12/12/19-  Pt is sitting in the bedside chair . Still C/O SOB with exertion though thinks it is getting better .  Episode of nausea and vomiting shortly after taking morning meds required a dose of antiemetic   Felt better shortly after giving Zofran. Appetite is fair   Performance with PT is improving   Remains on O2 at 4L/min   Good diuresis is noted   Pt denies chest pain   12/13/19-  Remains on O2 at 4L/min  Per Nursing note ---Intermittent nausea and vomiting throughout the night. Mild improvement with PRN medications. Complaints of heartburn and feeling bloated. BM attempted multiple times.  This morning during visit pt is noted to have upper abdominal discomfort which he describes as cramp , c/o heartburn , nausea , decreased appetite . States smelling food makes him nauseated. Denies chest pain or worsening of SOB. Not finding a comfortable position in the bed and prefers to sit in the chair.     GI cocktail and Bentyl 10 mg IV given with mild improvement of symptoms.  X-Ray KUB and U/S abd order is placed.   EKG and Troponin I ordered -  EKG - no new changes and troponin is down trend     Cardiology visited pt this morning.     12/14/19-  Deteriorated overnight with worsening respiratory status , worsening hypotension, decreased responsiveness , hypoglycemia , multiorgan failure include acute kidney injury with hyperkalemia , shock liver suggestive of  Cardiogenic shock secondary to ST elevation anterior wall MI. Pt was transferred back to ICU. Cardiology and Nephrology were called. Prognosis deemed grave.  Multiple discussions with  the patient's family which includes patient's wife,  daughter and son-in-law.  Patient made DNR and comfort measures offered.  No dialysis options was discussed with the family by Nephrology given pt's current extreme poor prognosis and medically irreversible clinical condition.     Pt passed away at 1240, 2019.

## 2019-12-07 NOTE — SUBJECTIVE & OBJECTIVE
Review of Systems   Constitutional: Positive for malaise/fatigue. Negative for chills and fever.   Respiratory: Positive for shortness of breath (intermittent).    Cardiovascular: Negative for orthopnea and leg swelling.        Intermittent chest tightness   Genitourinary:        New in place   Musculoskeletal: Negative.    Neurological: Negative for dizziness and focal weakness.   Psychiatric/Behavioral: The patient is nervous/anxious.          Objective:     Vital Signs (Most Recent):  Temp: 97.9 °F (36.6 °C) (12/07/19 1105)  Pulse: (!) 113 (12/07/19 1115)  Resp: (!) 27 (12/07/19 1115)  BP: 106/75 (12/07/19 1115)  SpO2: 97 % (12/07/19 1115) Vital Signs (24h Range):  Temp:  [97.5 °F (36.4 °C)-98 °F (36.7 °C)] 97.9 °F (36.6 °C)  Pulse:  [] 113  Resp:  [15-28] 27  SpO2:  [88 %-99 %] 97 %  BP: ()/(68-92) 106/75     Weight: 73.3 kg (161 lb 9.6 oz)  Body mass index is 26.89 kg/m².      Intake/Output Summary (Last 24 hours) at 12/7/2019 1135  Last data filed at 12/7/2019 1117  Gross per 24 hour   Intake 1343.2 ml   Output 1735 ml   Net -391.8 ml       Physical Exam   Constitutional: He is oriented to person, place, and time. He appears ill. Nasal cannula in place.   HENT:   Head: Atraumatic.   Eyes: Pupils are equal, round, and reactive to light. Conjunctivae are normal.   Neck: No tracheal deviation present.   Cardiovascular: Regular rhythm. Tachycardia present.   No murmur heard.  Pulses:       Radial pulses are 2+ on the right side, and 2+ on the left side.        Dorsalis pedis pulses are 2+ on the right side, and 2+ on the left side.   Pulmonary/Chest: Effort normal. He has decreased breath sounds. He has no wheezes. He has no rales.   Abdominal: Soft. Bowel sounds are normal.   Musculoskeletal: He exhibits no edema.   Neurological: He is alert and oriented to person, place, and time.   Skin: Skin is warm and dry. Capillary refill takes less than 2 seconds.            Vents:  Oxygen Concentration (%):  50 (12/07/19 0600)    Lines/Drains/Airways     Drain                 Urethral Catheter 12/07/19 1000 Latex less than 1 day          Peripheral Intravenous Line                 Peripheral IV - Single Lumen 12/05/19 2032 18 G Right Antecubital 1 day         Peripheral IV - Single Lumen 12/05/19 2329 20 G Right Forearm 1 day                Significant Labs:    CBC/Anemia Profile:  Recent Labs   Lab 12/05/19 2319 12/06/19 0324 12/07/19  0331   WBC 12.38 10.33 14.32*   HGB 14.4 16.0 16.4   HCT 43.4 48.4 49.8    287 292   MCV 95 95 97   RDW 12.5 12.6 13.2        Chemistries:  Recent Labs   Lab 12/05/19 2319 12/06/19 0324 12/07/19  0331    138 138   K 3.8 4.8 4.6    105 105   CO2 22* 20* 21*   BUN 20 21 30*   CREATININE 1.4 1.4 1.6*   CALCIUM 8.8 9.0 9.4   ALBUMIN 3.6 3.9 3.4*   PROT 6.7 7.5 6.8   BILITOT 0.8 1.0 1.2*   ALKPHOS 81 95 89   ALT 38 107* 107*   AST 51* 470* 332*   MG  --   --  1.9       All pertinent labs within the past 24 hours have been reviewed.    12 lead EKG obtained and reviewed with cardiology    Significant Imaging:  I have reviewed all pertinent imaging results/findings within the past 24 hours.

## 2019-12-08 PROBLEM — R11.0 NAUSEA: Status: RESOLVED | Noted: 2019-01-01 | Resolved: 2019-01-01

## 2019-12-08 NOTE — PROGRESS NOTES
MD at bedside and stated to give pt ativan.  And hold lasix if SBP <90s.  Will continue to monitor.

## 2019-12-08 NOTE — PLAN OF CARE
Pt aaox3.  Pt is -130 on the heart monitor.  Pt had multiple episodes of SOB, Tachycardia, tachypneic and anxiety.  Metoprolol IVP given and lasix increased. SOB this afternoon required pt to be placed on bipap.  Repeat echo done and per cardiology same as prior echo.  Resp: sats stable on 5L Nc and on bipap.  : mcfarland placed and good UOP, total UOP this shift is 1075ml.  Pt turned and repositioned with use of pillows, per cards pt is to not lay flat d/t SOB.  PIV intact with no redness, swelling or drainage.  Bed low, wheels locked, alarms audible, call light in reach.  Plan of care reviewed with pt and family.  Pt and family verbalizes understanding. Will continue to monitor.   Temp:  [97.7 °F (36.5 °C)-97.9 °F (36.6 °C)]   Pulse:  []   Resp:  [17-34]   BP: ()/()   SpO2:  [88 %-100 %]    I/O this shift:  In: 243.2 [P.O.:240; I.V.:3.2]  Out: 1075 [Urine:1075]

## 2019-12-08 NOTE — PLAN OF CARE
POC reviewed.  AAOx4. Follows commands. BRUNO. Respirations even, nonlabored on BiPAP 50%. SR/ST 90s-100s on monitor. New in place; 705 ml total this shift. Denies any pain or needs at this time.  No acute distress noted.  No nausea or vomiting this shift.  Call bell and bedside table within reach. SR upx2.

## 2019-12-08 NOTE — PROGRESS NOTES
[facility] -   Critical Care Medicine  Progress Note    Patient Name: Raleigh Walsh  MRN: [id number]  Admission Date: 12/5/2019  Hospital Length of Stay: 3 days  Code Status: No Order  Attending Provider: Gilmar Florence MD  Primary Care Provider: Akhil Smith MD   Principal Problem: STEMI (ST elevation myocardial infarction)    Subjective:     HPI:  74 year old male with PMH including CAD s/p MI with stent to LAD; HTN; HLD; depression; insomnia; reports YESENIA diagnosis and has CPAP but has not routinely used  Presented to [facility] ED with CP and EKG showed STEMI  tnkase given at 2046 and he was urgently transferred here for evaluation by cardiology  Taken for [facility] after arrival at this facility  PCI of LAD stent with balloon + new LAD stent perfromed    Hospital/ICU Course:  Admitted to ICU overnight post C  Intermittent complaints of chest discomfort along with occasional reperfusion ectopy on cardiac monitor  Intermittent nausea/vomiting throughout today  12/7 - tachycardia, anxiety reported overnight; this am tachycardia, chest pressure, SOB with transition off nocturnal CPAP to NC; troponin remains > 50k  12/8 - less pronounced dyspnea and tachycardia today; Dr Andres reports no decline on repeat echo last evening; afebrile; diuresed 1.5L    Review of Systems   Constitutional: Positive for malaise/fatigue. Negative for chills and fever.   Respiratory: Positive for shortness of breath (intermittent).    Cardiovascular: Negative for orthopnea and leg swelling.        Intermittent chest tightness   Genitourinary:        New in place   Musculoskeletal: Negative.    Neurological: Negative for dizziness and focal weakness.   Psychiatric/Behavioral: The patient is nervous/anxious.          Objective:     Vital Signs (Most Recent):  Temp: 97 °F (36.1 °C) (12/08/19 1105)  Pulse: (!) 115 (12/08/19 1200)  Resp: (!) 23 (12/08/19 1200)  BP: (!) 85/64 (12/08/19 1200)  SpO2: 99 % (12/08/19 1200) Vital  Signs (24h Range):  Temp:  [96.8 °F (36 °C)-98.1 °F (36.7 °C)] 97 °F (36.1 °C)  Pulse:  [] 115  Resp:  [14-34] 23  SpO2:  [88 %-100 %] 99 %  BP: ()/() 85/64     Weight: 73.3 kg (161 lb 9.6 oz)  Body mass index is 26.89 kg/m².      Intake/Output Summary (Last 24 hours) at 12/8/2019 1312  Last data filed at 12/8/2019 1200  Gross per 24 hour   Intake 100 ml   Output 1690 ml   Net -1590 ml       Physical Exam   Constitutional: He is oriented to person, place, and time. He appears ill. Nasal cannula in place.   HENT:   Head: Atraumatic.   Eyes: Pupils are equal, round, and reactive to light. Conjunctivae are normal.   Neck: No tracheal deviation present.   Cardiovascular: Regular rhythm. Tachycardia present.   No murmur heard.  Pulses:       Radial pulses are 2+ on the right side, and 2+ on the left side.        Dorsalis pedis pulses are 2+ on the right side, and 2+ on the left side.   Pulmonary/Chest: Effort normal. He has decreased breath sounds. He has no wheezes. He has no rales.   Abdominal: Soft. Bowel sounds are normal.   Musculoskeletal: He exhibits no edema.   Neurological: He is alert and oriented to person, place, and time.   Skin: Skin is warm and dry. Capillary refill takes less than 2 seconds.            Vents:  Oxygen Concentration (%): 40 (12/08/19 1127)    Lines/Drains/Airways     Drain                 Urethral Catheter 12/07/19 1000 Latex 1 day          Peripheral Intravenous Line                 Peripheral IV - Single Lumen 12/05/19 2032 18 G Right Antecubital 2 days         Peripheral IV - Single Lumen 12/05/19 2329 20 G Right Forearm 2 days                Significant Labs:    CBC/Anemia Profile:  Recent Labs   Lab 12/07/19 0331 12/08/19 0317   WBC 14.32* 15.08*   HGB 16.4 16.1   HCT 49.8 50.0    262   MCV 97 98   RDW 13.2 13.1        Chemistries:  Recent Labs   Lab 12/07/19  0331 12/08/19 0317 12/08/19  0839    139 139   K 4.6 5.6* 4.3    98 96   CO2 21* 27 28    BUN 30* 41* 42*   CREATININE 1.6* 1.8* 1.7*   CALCIUM 9.4 9.8 9.7   ALBUMIN 3.4* 3.3*  --    PROT 6.8 7.2  --    BILITOT 1.2* 1.7*  --    ALKPHOS 89 82  --    * 76*  --    * 159*  --    MG 1.9  --   --        All pertinent labs within the past 24 hours have been reviewed.    Significant Imaging:  I have reviewed all pertinent imaging results/findings within the past 24 hours.      ABG  No results for input(s): PH, PO2, PCO2, HCO3, BE in the last 168 hours.  Assessment/Plan:     Cardiac/Vascular  * STEMI (ST elevation myocardial infarction)  Post PCI  Echo shows severely reduced EF 15%, diastolic dysfunction, and elevated PA pressure  On ASA, plavix, statin, BB and ACEi  Concern for intermittent distress, continued troponin elevation, and EKG findings; discussed with cardiology - additional IV BB given; tridil infusion trial failed with hypotension; may need more aggressive support  Continue close ICU hemodynamic monitoring  12/8 - no significant changes overnight; continue BB, ASA, statin, ACEi; continue ICU hemodynamic monitoring    Paroxysmal VT  Decreased ectopy and short runs of v tach through night; none seen to now this morning  Continue BB  12/8 - ICU hemodynamic monitoring    Renal/  PENNY (acute kidney injury)  Likely s/t cardiogenic shock; monitor trend    Oncology  Leukocytosis, likely reactive   Suspect reactive  Encourage IS and mobilize    Other  YESENIA (obstructive sleep apnea)  Nocturnal CPAP ordered       Critical Care Daily Checklist:    A: Awake: RASS Goal/Actual Goal:    Actual: Musa Agitation Sedation Scale (RASS): Alert and calm   B: Spontaneous Breathing Trial Performed?     C: SAT & SBT Coordinated?  n/a                      D: Delirium: CAM-ICU Overall CAM-ICU: Negative   E: Early Mobility Performed? ROM   F: Feeding Goal:    Status:     Current Diet Order   Procedures    Diet Cardiac     Fluid restriction 1500 ml/day      AS: Analgesia/Sedation prn   T: Thromboembolic  Prophylaxis lovenox   H: HOB > 300 Yes   U: Stress Ulcer Prophylaxis (if needed) pepcid   G: Glucose Control monitoring   B: Bowel Function     I: Indwelling Catheter (Lines & New) Necessity reviewed   D: De-escalation of Antimicrobials/Pharmacotherapies reviewed    Plan for the day/ETD As above    Family/Goals of Care: Home on discharge   I have discussed case and plan of care in detail with Dr Barnhart; Status and plan of care were discussed with team on multidisciplinary rounds.    Critical Care Time: 45 minutes  Critical care was time spent personally by me on the following activities: development of treatment plan with patient or surrogate and bedside caregivers, discussions with consultants, evaluation of patient's response to treatment, examination of patient, ordering and performing treatments and interventions, ordering and review of laboratory studies, ordering and review of radiographic studies, pulse oximetry, re-evaluation of patient's condition. This critical care time did not overlap with that of any other provider or involve time for any procedures.     MARIELENA Pa-BC  Critical Care Medicine  [facility]

## 2019-12-08 NOTE — ASSESSMENT & PLAN NOTE
- New onset Post -MI   -Continue Diuresis   -Continue BB  -ACEI is discontinued by Cardiology due to hypotension or marginal BP   -Close monitoring in ICU   12/8/19-  Good diuresis is noted with IV Furosemide   Started back on ACEI   Continue BB

## 2019-12-08 NOTE — ASSESSMENT & PLAN NOTE
Post PCI  Echo shows severely reduced EF 15%, diastolic dysfunction, and elevated PA pressure  On ASA, plavix, statin, BB and ACEi  Concern for intermittent distress, continued troponin elevation, and EKG findings; discussed with cardiology - additional IV BB given; tridil infusion trial failed with hypotension; may need more aggressive support  Continue close ICU hemodynamic monitoring  12/8 - no significant changes overnight; continue BB, ASA, statin, ACEi; continue ICU hemodynamic monitoring

## 2019-12-08 NOTE — ASSESSMENT & PLAN NOTE
Decreased ectopy and short runs of v tach through night; none seen to now this morning  Continue BB  12/8 - ICU hemodynamic monitoring

## 2019-12-08 NOTE — SUBJECTIVE & OBJECTIVE
Interval History: no acute issues noted o/n. Tolerated Bipap o/n. Continues to have tachycardia and shortness of breath episodes. Unable to tolerate Tridil gtt yesterday due to hypotension. Medical therapy has been difficult to adjust due to hypotension and tachycardia issues.     Review of Systems   Constitution: Positive for diaphoresis and malaise/fatigue.   HENT: Negative for hearing loss and hoarse voice.    Eyes: Negative for blurred vision and visual disturbance.   Cardiovascular: Positive for chest pain, irregular heartbeat, orthopnea and palpitations. Negative for claudication, dyspnea on exertion, leg swelling, near-syncope, paroxysmal nocturnal dyspnea and syncope.   Respiratory: Positive for cough, shortness of breath and snoring. Negative for hemoptysis, sleep disturbances due to breathing and wheezing.    Endocrine: Negative for cold intolerance and heat intolerance.   Hematologic/Lymphatic: Bruises/bleeds easily.   Skin: Negative for color change, dry skin and nail changes.   Musculoskeletal: Positive for arthritis and back pain. Negative for joint pain and myalgias.   Gastrointestinal: Negative for bloating, abdominal pain, constipation, nausea and vomiting.   Genitourinary: Negative for dysuria, flank pain, hematuria and hesitancy.   Neurological: Positive for weakness. Negative for headaches, light-headedness, loss of balance, numbness and paresthesias.   Psychiatric/Behavioral: Negative for altered mental status.   Allergic/Immunologic: Negative for environmental allergies.     Objective:     Vital Signs (Most Recent):  Temp: 96.8 °F (36 °C) (12/08/19 0705)  Pulse: (!) 133 (12/08/19 1000)  Resp: (!) 26 (12/08/19 1000)  BP: 109/65 (12/08/19 1000)  SpO2: (!) 90 % (12/08/19 1000) Vital Signs (24h Range):  Temp:  [96.8 °F (36 °C)-98.1 °F (36.7 °C)] 96.8 °F (36 °C)  Pulse:  [] 133  Resp:  [14-34] 26  SpO2:  [90 %-100 %] 90 %  BP: ()/() 109/65     Weight: 73.3 kg (161 lb 9.6 oz)  Body  mass index is 26.89 kg/m².     SpO2: (!) 90 %  O2 Device (Oxygen Therapy): nasal cannula      Intake/Output Summary (Last 24 hours) at 12/8/2019 1046  Last data filed at 12/8/2019 1000  Gross per 24 hour   Intake 100 ml   Output 2075 ml   Net -1975 ml       Lines/Drains/Airways     Drain                 Urethral Catheter 12/07/19 1000 Latex 1 day          Peripheral Intravenous Line                 Peripheral IV - Single Lumen 12/05/19 2032 18 G Right Antecubital 2 days         Peripheral IV - Single Lumen 12/05/19 2329 20 G Right Forearm 2 days                Physical Exam   Constitutional: He is oriented to person, place, and time. He appears well-developed and well-nourished. No distress.   HENT:   Head: Normocephalic and atraumatic.   Eyes: Pupils are equal, round, and reactive to light.   Neck: Normal range of motion and full passive range of motion without pain. Neck supple. No JVD present. No tracheal deviation present. No thyromegaly present.   Cardiovascular: Regular rhythm, S1 normal, S2 normal and intact distal pulses. Frequent extrasystoles are present. Tachycardia present. PMI is not displaced. Exam reveals no distant heart sounds.   No murmur heard.  Pulses:       Radial pulses are 1+ on the right side, and 1+ on the left side.        Dorsalis pedis pulses are 1+ on the right side, and 1+ on the left side.   Right groin access site C/D/I, no hematoma or ecchymosis noted. No drainage or signs of infection noted.    Pulmonary/Chest: No accessory muscle usage. No respiratory distress. He has decreased breath sounds in the right lower field and the left lower field. He has no wheezes. He has no rales.   +Bipap o/n   Abdominal: Soft. Bowel sounds are normal. He exhibits no distension. There is no tenderness.   Musculoskeletal: Normal range of motion. He exhibits no edema.        Right ankle: He exhibits no swelling.        Left ankle: He exhibits no swelling.   Neurological: He is alert and oriented to  person, place, and time.   Skin: Skin is warm. He is not diaphoretic. No cyanosis. No pallor. Nails show no clubbing.   Psychiatric: He has a normal mood and affect. His speech is normal and behavior is normal. Judgment and thought content normal. Cognition and memory are normal.   Nursing note and vitals reviewed.      Significant Labs:   ABG: No results for input(s): PH, PCO2, HCO3, POCSATURATED, BE in the last 48 hours., Blood Culture: No results for input(s): LABBLOO in the last 48 hours., BMP:   Recent Labs   Lab 12/07/19  0331 12/08/19  0317 12/08/19  0839   * 122* 123*    139 139   K 4.6 5.6* 4.3    98 96   CO2 21* 27 28   BUN 30* 41* 42*   CREATININE 1.6* 1.8* 1.7*   CALCIUM 9.4 9.8 9.7   MG 1.9  --   --    , CBC   Recent Labs   Lab 12/07/19  0331 12/08/19  0317   WBC 14.32* 15.08*   HGB 16.4 16.1   HCT 49.8 50.0    262   , Troponin   Recent Labs   Lab 12/06/19  2045 12/07/19  0331 12/07/19  1148   TROPONINI >50.000* >50.000* >50.000*    and All pertinent lab results from the last 24 hours have been reviewed.    Significant Imaging: Echocardiogram:   2D echo with color flow doppler:   Results for orders placed or performed during the hospital encounter of 12/05/19   2D echo with color flow doppler   Result Value Ref Range    QEF 25 (A) 55 - 65    Pericardial Effusion SMALL (A)     Narrative    Date of Procedure: 12/07/2019        TEST DESCRIPTION   Technical Quality: This is a technically adequate study.     Aorta: The aortic root is normal in size.     Left Atrium: The left atrium is normal in size.     Left Ventricle: The left ventricle is normal in size. LV wall thickness is normal. The following segments were akinetic: apical septum, apical lateral wall, apical inferior wall, apical anterior wall.  The following segments were mildly hypokinetic: basal anterior wall.  The following segments were severely hypokinetic: mid inferoseptum, mid anterior wall.  Left ventricular systolic  function appears severely depressed. Visually estimated ejection fraction is 25-30%.     Diastolic indices:     Right Atrium: The right atrium is normal in size.     Right Ventricle: The right ventricle is normal in size. Global right ventricular systolic function appears normal.     Aortic Valve:  The aortic valve is normal in structure.     Mitral Valve:  The mitral valve is normal in structure.     Pulmonary Valve:  The pulmonic valve is not well seen.     Pericardium: There is evidence of a small postero-lateral pericardial effusion.     IVC: IVC is enlarged but collapses > 50% with a sniff, suggesting intermediate right atrial pressure of 8 mmHg.     Intracavitary: There is no evidence of intracavity mass, thrombi, or vegetation.         CONCLUSIONS     1 - Wall motion abnormalities.     2 - Severely depressed left ventricular systolic function (EF 25-30%).     3 - Normal right ventricular systolic function .     4 - Focal small pericardial effusion on right side. No tamponade    5 - Intermediate central venous pressure.     6 - Limited echo study to rule out mechanical rupture.            This document has been electronically    SIGNED BY: Lamont Andres MD On: 12/08/2019 10:28    and X-Ray: CXR: X-Ray Chest 1 View (CXR):   Results for orders placed or performed during the hospital encounter of 12/05/19   X-Ray Chest 1 View    Narrative    EXAMINATION:  XR CHEST 1 VIEW    CLINICAL HISTORY:  Congestive heart failure, CHF;    COMPARISON:  12/07/2019.    FINDINGS:  Heart size is normal.  Stable bilateral interstitial infiltrates.      Impression    Stable bilateral interstitial infiltrates.  Continued follow-up is recommended.      Electronically signed by: Devyn Schulte MD  Date:    12/08/2019  Time:    10:31    and X-Ray Chest PA and Lateral (CXR): No results found for this visit on 12/05/19.

## 2019-12-08 NOTE — ASSESSMENT & PLAN NOTE
-EF 15-20%, +WMA, PHTN  -Continue IV lasix BID, IV lopressor Q6H  -Use Ramipril if BP can tolerate  -Dash diet, 2 gm sodium restriction  -1200 ml fluid restriction  -Daily weights  -Strict intake and output  -Avoid exertional activities  -CXR stable  -Reassess in AM

## 2019-12-08 NOTE — ASSESSMENT & PLAN NOTE
- Likely reactive   -Procalcitonin is marginally elevated   -CXR- 12/8/19- bilateral interstitial infiltrate likely  due to pul congestion related to acute CHF   -Get Blood cultures x 2  -Get UA   -Monitor

## 2019-12-08 NOTE — PROGRESS NOTES
[facility] Hospital Medicine  Progress Note    Patient Name: Raleigh Walsh  MRN: [id number]  Patient Class: IP- Inpatient   Admission Date: 12/5/2019  Length of Stay: 3 days  Attending Physician: Gilmar Florence MD  Primary Care Provider: Akhil Smith MD        Subjective:     Principal Problem:STEMI (ST elevation myocardial infarction)        HPI:  73 YO WM with known CAD S/P MI and prior stenting;  Presented to ED at [facility] after experiencing chest pain while at a ballgame; He ruled in foe a STEMI; was treated with TPA and transferred here; He was taken to the cath lab where he underwent PCI with stent placed to the proximal LAD by Dr. Alcazar.  He was transferred to the ICU in stable condition.    Overview/Hospital Course:  12/7/19-  Pt c/o new onset SOB on minimal exertion . Denies chest pain.   Remains on O2 at 5L/min. Pt is noted to be tachycardic and tachypnic.   Episodes of nausea and emesis x 3 yesterday but none today   EKG revealed sinus tachycardia , ST elevation ant and lat leads and no longer PVC's   Echo revealed severely depressed left vent systolic function with EF of 15-20%, PA pressure 43.   Cardiology is recommending diuresis with IV furosemide , continue BB and start Tridil drip.   Prognosis is guarded at this time.   12/8/19-  Pt is seen at bedside . Remains mildly tachycardic and tachypnic .  Reports SOB is better today . Good urine output noted with diuresis 1.5 L yesterday   Remains on O2 at 5L/min. Tolerated BiPAP well last night.     Tridil drip discontinued yesterday due to hypotension   Remains on low dose BB and Ramipril restarted at 2.5 mg daily   Prognosis guarded.       Interval History:    Remains mildly tachycardic and tachypnic .  Reports SOB is better today . Good urine output noted with diuresis 1.5 L yesterday   Remains on O2 at 5L/min. Tolerated BiPAP well last night.     Tridil drip discontinued yesterday due to hypotension   Remains on low dose  BB and Ramipril restarted at 2.5 mg daily   Prognosis guarded.       Review of Systems   Constitutional: Positive for activity change, appetite change and fatigue. Negative for fever.   HENT: Negative for sore throat.    Eyes: Negative for visual disturbance.   Respiratory: Positive for shortness of breath (on minimal exertion ). Negative for cough and chest tightness.    Cardiovascular: Positive for palpitations. Negative for chest pain and leg swelling.   Gastrointestinal: Negative for abdominal distention, abdominal pain, constipation, diarrhea, nausea and vomiting.   Endocrine: Negative for polyuria.   Genitourinary: Negative for decreased urine volume, dysuria, flank pain, frequency and hematuria.   Musculoskeletal: Negative for back pain and gait problem.   Skin: Negative for rash.   Neurological: Negative for syncope, speech difficulty, weakness, light-headedness and headaches.   Psychiatric/Behavioral: Negative for confusion, hallucinations and sleep disturbance.     Objective:     Vital Signs (Most Recent):  Temp: 97 °F (36.1 °C) (12/08/19 1105)  Pulse: (!) 115 (12/08/19 1200)  Resp: (!) 23 (12/08/19 1200)  BP: (!) 85/64 (12/08/19 1200)  SpO2: 99 % (12/08/19 1200) Vital Signs (24h Range):  Temp:  [96.8 °F (36 °C)-98.1 °F (36.7 °C)] 97 °F (36.1 °C)  Pulse:  [] 115  Resp:  [14-34] 23  SpO2:  [88 %-100 %] 99 %  BP: ()/() 85/64     Weight: 73.3 kg (161 lb 9.6 oz)  Body mass index is 26.89 kg/m².    Intake/Output Summary (Last 24 hours) at 12/8/2019 1313  Last data filed at 12/8/2019 1200  Gross per 24 hour   Intake 100 ml   Output 1690 ml   Net -1590 ml      Physical Exam   Constitutional: He is oriented to person, place, and time. He appears well-developed and well-nourished. No distress.   HENT:   Head: Normocephalic and atraumatic.   Mouth/Throat: No oropharyngeal exudate.   Eyes: Pupils are equal, round, and reactive to light. Conjunctivae and EOM are normal.   Neck: Normal range of  motion. Neck supple. No JVD present. No thyromegaly present.   Cardiovascular: Normal rate, regular rhythm and normal heart sounds.   No murmur heard.  Pulmonary/Chest: Effort normal. No respiratory distress. He has wheezes (faint wheezes but better today ). He has no rales. He exhibits no tenderness.   Decreases breath sounds Rt base    Abdominal: Soft. Bowel sounds are normal. He exhibits no distension. There is no tenderness. There is no rebound and no guarding.   Musculoskeletal: Normal range of motion. He exhibits no edema.   Lymphadenopathy:     He has no cervical adenopathy.   Neurological: He is alert and oriented to person, place, and time. He displays normal reflexes. No cranial nerve deficit or sensory deficit.   Skin: Skin is warm and dry. No rash noted. He is not diaphoretic.   Psychiatric: He has a normal mood and affect.       Significant Labs:   BMP:   Recent Labs   Lab 12/07/19 0331 12/08/19  0839   *   < > 123*      < > 139   K 4.6   < > 4.3      < > 96   CO2 21*   < > 28   BUN 30*   < > 42*   CREATININE 1.6*   < > 1.7*   CALCIUM 9.4   < > 9.7   MG 1.9  --   --     < > = values in this interval not displayed.     CBC:   Recent Labs   Lab 12/07/19 0331 12/08/19 0317   WBC 14.32* 15.08*   HGB 16.4 16.1   HCT 49.8 50.0    262     CMP:   Recent Labs   Lab 12/07/19 0331 12/08/19 0317 12/08/19 0839    139 139   K 4.6 5.6* 4.3    98 96   CO2 21* 27 28   * 122* 123*   BUN 30* 41* 42*   CREATININE 1.6* 1.8* 1.7*   CALCIUM 9.4 9.8 9.7   PROT 6.8 7.2  --    ALBUMIN 3.4* 3.3*  --    BILITOT 1.2* 1.7*  --    ALKPHOS 89 82  --    * 159*  --    * 76*  --    ANIONGAP 12 14 15   EGFRNONAA 42* 37* 39*       Significant Imaging:       Assessment/Plan:      * STEMI (ST elevation myocardial infarction)  STEMI   S/p PCI with JOSE to proximal LAD (See operative Note)  Continue ASA/Statin/BB/Plavix  Topical nitrites;  Supplemental oxygen;   Consider CPAP for  desaturations  Pain relief measures: hydrocodone and Prn morphine  Continue as per Cardiology;  Trend troponin   12/8/19-  Continue ASA, Plavix , high intensity statin, BB and ACEI       Acute systolic congestive heart failure, NYHA class 3  - New onset Post -MI   -Continue Diuresis   -Continue BB  -ACEI is discontinued by Cardiology due to hypotension or marginal BP   -Close monitoring in ICU   12/8/19-  Good diuresis is noted with IV Furosemide   Started back on ACEI   Continue BB      Elevated LFTs  -Likely hepatic congestion due to acute CHF.   -Continue Diuresis   -Improving with diuresis     Paroxysmal VT  - Likely reperfusion arrhythmia   -continue BB      PENNY (acute kidney injury)  -Baseline creatinine 1.1 to 1.2  -Monitor Renal indices while on diuretic therapy       Leukocytosis, likely reactive   - Likely reactive   -Procalcitonin is marginally elevated   -CXR- 12/8/19- bilateral interstitial infiltrate likely  due to pul congestion related to acute CHF   -Get Blood cultures x 2  -Get UA   -Monitor       YESENIA (obstructive sleep apnea)  -Encouraged nightly C-PAP        VTE Risk Mitigation (From admission, onward)         Ordered     enoxaparin injection 40 mg  Daily      12/06/19 0910                Critical care time spent on the evaluation and treatment of severe organ dysfunction, review of pertinent labs and imaging studies, discussions with consulting providers and discussions with patient/family: 30  minutes.      Gilmar Florence MD  Department of Hospital Medicine   [facility]

## 2019-12-08 NOTE — ASSESSMENT & PLAN NOTE
Continue BB  Keep K+ >4 and Mag >2  Continue telemetry monitoring  Keep in ICU for now    12/7  -continue BB    12/8  -keep K+>4 and Mag>2  -Continue BB as tolerated

## 2019-12-08 NOTE — ASSESSMENT & PLAN NOTE
75 y/o male woth PMHx for CADHx of PCI, HTN, HLP presented to [facility] ER with anterior MI and tx with TNK. Pt transferred to OMR. Pt initially reperfused clinically but later on with CP.  Pt taken to the cath lab for postinfarct angina.    12/6/19  -s/p PCI of LAD per Dr. Alcazar  -ECHO pending  -Continue ASA, Statin, BB, ACEi, Plavix  -Check FLP  -Keep in ICU for today  -Dash diet, 2 gm sodium restriction  -1.5L Fluid restriction  -Repeat labs in AM    12/7/19  -Echo revealed EF 15-20%, +WMA's, PHTN, DD  -Add Tridil gtt today  -Stop ACEi today given need for Tridil gtt for symptom management   -Continue ASA, Statin, Plavix, BB, IV lasix BID, Tridil gtt  -Further recs to follow pending clinical course    12/8/19  -Continue ASA, Statin, Plavix, IV lasix BID, IV lopressor q6H  -Add ramipril if BP can tolerate  -keep in ICU for close monitoring given clinical condition with little/no improvement

## 2019-12-08 NOTE — SUBJECTIVE & OBJECTIVE
Review of Systems   Constitutional: Positive for malaise/fatigue. Negative for chills and fever.   Respiratory: Positive for shortness of breath (intermittent).    Cardiovascular: Negative for orthopnea and leg swelling.        Intermittent chest tightness   Genitourinary:        New in place   Musculoskeletal: Negative.    Neurological: Negative for dizziness and focal weakness.   Psychiatric/Behavioral: The patient is nervous/anxious.          Objective:     Vital Signs (Most Recent):  Temp: 97 °F (36.1 °C) (12/08/19 1105)  Pulse: (!) 115 (12/08/19 1200)  Resp: (!) 23 (12/08/19 1200)  BP: (!) 85/64 (12/08/19 1200)  SpO2: 99 % (12/08/19 1200) Vital Signs (24h Range):  Temp:  [96.8 °F (36 °C)-98.1 °F (36.7 °C)] 97 °F (36.1 °C)  Pulse:  [] 115  Resp:  [14-34] 23  SpO2:  [88 %-100 %] 99 %  BP: ()/() 85/64     Weight: 73.3 kg (161 lb 9.6 oz)  Body mass index is 26.89 kg/m².      Intake/Output Summary (Last 24 hours) at 12/8/2019 1312  Last data filed at 12/8/2019 1200  Gross per 24 hour   Intake 100 ml   Output 1690 ml   Net -1590 ml       Physical Exam   Constitutional: He is oriented to person, place, and time. He appears ill. Nasal cannula in place.   HENT:   Head: Atraumatic.   Eyes: Pupils are equal, round, and reactive to light. Conjunctivae are normal.   Neck: No tracheal deviation present.   Cardiovascular: Regular rhythm. Tachycardia present.   No murmur heard.  Pulses:       Radial pulses are 2+ on the right side, and 2+ on the left side.        Dorsalis pedis pulses are 2+ on the right side, and 2+ on the left side.   Pulmonary/Chest: Effort normal. He has decreased breath sounds. He has no wheezes. He has no rales.   Abdominal: Soft. Bowel sounds are normal.   Musculoskeletal: He exhibits no edema.   Neurological: He is alert and oriented to person, place, and time.   Skin: Skin is warm and dry. Capillary refill takes less than 2 seconds.            Vents:  Oxygen Concentration (%): 40  (12/08/19 1127)    Lines/Drains/Airways     Drain                 Urethral Catheter 12/07/19 1000 Latex 1 day          Peripheral Intravenous Line                 Peripheral IV - Single Lumen 12/05/19 2032 18 G Right Antecubital 2 days         Peripheral IV - Single Lumen 12/05/19 2329 20 G Right Forearm 2 days                Significant Labs:    CBC/Anemia Profile:  Recent Labs   Lab 12/07/19  0331 12/08/19 0317   WBC 14.32* 15.08*   HGB 16.4 16.1   HCT 49.8 50.0    262   MCV 97 98   RDW 13.2 13.1        Chemistries:  Recent Labs   Lab 12/07/19  0331 12/08/19 0317 12/08/19  0839    139 139   K 4.6 5.6* 4.3    98 96   CO2 21* 27 28   BUN 30* 41* 42*   CREATININE 1.6* 1.8* 1.7*   CALCIUM 9.4 9.8 9.7   ALBUMIN 3.4* 3.3*  --    PROT 6.8 7.2  --    BILITOT 1.2* 1.7*  --    ALKPHOS 89 82  --    * 76*  --    * 159*  --    MG 1.9  --   --        All pertinent labs within the past 24 hours have been reviewed.    Significant Imaging:  I have reviewed all pertinent imaging results/findings within the past 24 hours.

## 2019-12-08 NOTE — PROGRESS NOTES
Confirmed with lab that blood cultures were collected.  Lab stated they have been collected and already sent to [facility] at this time.

## 2019-12-08 NOTE — SUBJECTIVE & OBJECTIVE
Interval History:    Remains mildly tachycardic and tachypnic .  Reports SOB is better today . Good urine output noted with diuresis 1.5 L yesterday   Remains on O2 at 5L/min. Tolerated BiPAP well last night.     Tridil drip discontinued yesterday due to hypotension   Remains on low dose BB and Ramipril restarted at 2.5 mg daily   Prognosis guarded.       Review of Systems   Constitutional: Positive for activity change, appetite change and fatigue. Negative for fever.   HENT: Negative for sore throat.    Eyes: Negative for visual disturbance.   Respiratory: Positive for shortness of breath (on minimal exertion ). Negative for cough and chest tightness.    Cardiovascular: Positive for palpitations. Negative for chest pain and leg swelling.   Gastrointestinal: Negative for abdominal distention, abdominal pain, constipation, diarrhea, nausea and vomiting.   Endocrine: Negative for polyuria.   Genitourinary: Negative for decreased urine volume, dysuria, flank pain, frequency and hematuria.   Musculoskeletal: Negative for back pain and gait problem.   Skin: Negative for rash.   Neurological: Negative for syncope, speech difficulty, weakness, light-headedness and headaches.   Psychiatric/Behavioral: Negative for confusion, hallucinations and sleep disturbance.     Objective:     Vital Signs (Most Recent):  Temp: 97 °F (36.1 °C) (12/08/19 1105)  Pulse: (!) 115 (12/08/19 1200)  Resp: (!) 23 (12/08/19 1200)  BP: (!) 85/64 (12/08/19 1200)  SpO2: 99 % (12/08/19 1200) Vital Signs (24h Range):  Temp:  [96.8 °F (36 °C)-98.1 °F (36.7 °C)] 97 °F (36.1 °C)  Pulse:  [] 115  Resp:  [14-34] 23  SpO2:  [88 %-100 %] 99 %  BP: ()/() 85/64     Weight: 73.3 kg (161 lb 9.6 oz)  Body mass index is 26.89 kg/m².    Intake/Output Summary (Last 24 hours) at 12/8/2019 1313  Last data filed at 12/8/2019 1200  Gross per 24 hour   Intake 100 ml   Output 1690 ml   Net -1590 ml      Physical Exam   Constitutional: He is oriented to  person, place, and time. He appears well-developed and well-nourished. No distress.   HENT:   Head: Normocephalic and atraumatic.   Mouth/Throat: No oropharyngeal exudate.   Eyes: Pupils are equal, round, and reactive to light. Conjunctivae and EOM are normal.   Neck: Normal range of motion. Neck supple. No JVD present. No thyromegaly present.   Cardiovascular: Normal rate, regular rhythm and normal heart sounds.   No murmur heard.  Pulmonary/Chest: Effort normal. No respiratory distress. He has wheezes (faint wheezes but better today ). He has no rales. He exhibits no tenderness.   Decreases breath sounds Rt base    Abdominal: Soft. Bowel sounds are normal. He exhibits no distension. There is no tenderness. There is no rebound and no guarding.   Musculoskeletal: Normal range of motion. He exhibits no edema.   Lymphadenopathy:     He has no cervical adenopathy.   Neurological: He is alert and oriented to person, place, and time. He displays normal reflexes. No cranial nerve deficit or sensory deficit.   Skin: Skin is warm and dry. No rash noted. He is not diaphoretic.   Psychiatric: He has a normal mood and affect.       Significant Labs:   BMP:   Recent Labs   Lab 12/07/19 0331 12/08/19 0839   *   < > 123*      < > 139   K 4.6   < > 4.3      < > 96   CO2 21*   < > 28   BUN 30*   < > 42*   CREATININE 1.6*   < > 1.7*   CALCIUM 9.4   < > 9.7   MG 1.9  --   --     < > = values in this interval not displayed.     CBC:   Recent Labs   Lab 12/07/19 0331 12/08/19 0317   WBC 14.32* 15.08*   HGB 16.4 16.1   HCT 49.8 50.0    262     CMP:   Recent Labs   Lab 12/07/19 0331 12/08/19 0317 12/08/19 0839    139 139   K 4.6 5.6* 4.3    98 96   CO2 21* 27 28   * 122* 123*   BUN 30* 41* 42*   CREATININE 1.6* 1.8* 1.7*   CALCIUM 9.4 9.8 9.7   PROT 6.8 7.2  --    ALBUMIN 3.4* 3.3*  --    BILITOT 1.2* 1.7*  --    ALKPHOS 89 82  --    * 159*  --    * 76*  --    ANIONGAP 12  14 15   EGFRNONAA 42* 37* 39*       Significant Imaging:

## 2019-12-08 NOTE — PROGRESS NOTES
Dmitry WALDEN made aware of pt blood pressure MAP <65 past few hours.  UOP avg 30ml/hr.  -120s.  Pt c/o mild SOB but resting comfortably on bipap.

## 2019-12-08 NOTE — PLAN OF CARE
Pt alternated between BIPAP and 4L NC this shift. Currently, he is on BIPAP with documented settings.

## 2019-12-08 NOTE — PLAN OF CARE
Pt aaox3.  Pt is -130 on the heart monitor.  Ppt did not have any episodes of severe SOB.  BP remaining low this afternoon, Dmitry WALDEN made aware of MAP <65. No new orders.  Metoprolol held d/t BP.  Pt has mild SOB at rest and increased with exertion, pt resting comfortably on bipap at this time.  Resp: sats stable on 5L Nc and on bipap.  : mcfarland in place and minimal output.  Pt turned and repositioned with use of pillows.  PIV intact with no redness, swelling or drainage.  Bed low, wheels locked, alarms audible, call light in reach.  Plan of care reviewed with pt and family.  Pt and family verbalizes understanding. Will continue to monitor.   Temp:  [96.8 °F (36 °C)-98 °F (36.7 °C)]   Pulse:  []   Resp:  [16-26]   BP: ()/(49-75)   SpO2:  [88 %-99 %]    I/O this shift:  In: 720 [P.O.:720]  Out: 690 [Urine:690]

## 2019-12-08 NOTE — ASSESSMENT & PLAN NOTE
STEMI   S/p PCI with JOSE to proximal LAD (See operative Note)  Continue ASA/Statin/BB/Plavix  Topical nitrites;  Supplemental oxygen;   Consider CPAP for desaturations  Pain relief measures: hydrocodone and Prn morphine  Continue as per Cardiology;  Trend troponin   12/8/19-  Continue ASA, Plavix , high intensity statin, BB and ACEI

## 2019-12-08 NOTE — PROGRESS NOTES
[facility] -   Cardiology  Progress Note    Patient Name: Raleigh Walsh  MRN: [id number]  Admission Date: 12/5/2019  Hospital Length of Stay: 3 days  Code Status: No Order   Attending Physician: Gilmar Florence MD   Primary Care Physician: Akhil Smith MD  Expected Discharge Date:   Principal Problem:STEMI (ST elevation myocardial infarction)    Subjective:   HPI    75 y/o male woth PMHx for CADHx of PCI, HTN, HLP presented to [facility] ER with anterior MI and tx with TNK. [facility] Pt initially reperfused clinically but later on with CP.  Pt taken to the cath lab for postinfarct angina.    Hospital Course:   12/6/19--Patient seen and examined in room, lying in bed. Continues to complain of intermittent chest discomfort today but much improved since PCI. He was noted to have 8-10 beats of VT on monitor at time of exam this AM. Keep in ICU for now. Labs reviewed, K+ 4.8, Cr 1.4, Troponin >50.     12/7/19--Patient seen and examined in ICU, lying in bed. Had multiple episodes of shortness of breath, palpitations with diaphoresis noted o/n. HR at time of exam 110s. Will optimize medical regimen for CAD/CHF today. Discussed with patient and family at bedside given guarded prognosis given ICM post STEMI. Will add Tridil gtt today and reassess response.     12/8/19--Patient seen and examined in ICU today. Reports continued shortness of breath today but slightly/marginally improved from yesterday. HR remains elevated today in 110-120s, EKG revealed ST with PACs today. Patient unable to tolerate Tridil gtt yesterday due to hypotension. Will try to optimize medical regimen today but low BP and elevated HR makes medical therapy challenging. Labs reviewed, K+ 4.3, Cr 1.7, BUN 42. Repeat CXR ordered.     Interval History: no acute issues noted o/n. Tolerated Bipap o/n. Continues to have tachycardia and shortness of breath episodes. Unable to tolerate Tridil gtt yesterday due to hypotension.  Medical therapy has been difficult to adjust due to hypotension and tachycardia issues.     Review of Systems   Constitution: Positive for diaphoresis and malaise/fatigue.   HENT: Negative for hearing loss and hoarse voice.    Eyes: Negative for blurred vision and visual disturbance.   Cardiovascular: Positive for chest pain, irregular heartbeat, orthopnea and palpitations. Negative for claudication, dyspnea on exertion, leg swelling, near-syncope, paroxysmal nocturnal dyspnea and syncope.   Respiratory: Positive for cough, shortness of breath and snoring. Negative for hemoptysis, sleep disturbances due to breathing and wheezing.    Endocrine: Negative for cold intolerance and heat intolerance.   Hematologic/Lymphatic: Bruises/bleeds easily.   Skin: Negative for color change, dry skin and nail changes.   Musculoskeletal: Positive for arthritis and back pain. Negative for joint pain and myalgias.   Gastrointestinal: Negative for bloating, abdominal pain, constipation, nausea and vomiting.   Genitourinary: Negative for dysuria, flank pain, hematuria and hesitancy.   Neurological: Positive for weakness. Negative for headaches, light-headedness, loss of balance, numbness and paresthesias.   Psychiatric/Behavioral: Negative for altered mental status.   Allergic/Immunologic: Negative for environmental allergies.     Objective:     Vital Signs (Most Recent):  Temp: 96.8 °F (36 °C) (12/08/19 0705)  Pulse: (!) 133 (12/08/19 1000)  Resp: (!) 26 (12/08/19 1000)  BP: 109/65 (12/08/19 1000)  SpO2: (!) 90 % (12/08/19 1000) Vital Signs (24h Range):  Temp:  [96.8 °F (36 °C)-98.1 °F (36.7 °C)] 96.8 °F (36 °C)  Pulse:  [] 133  Resp:  [14-34] 26  SpO2:  [90 %-100 %] 90 %  BP: ()/() 109/65     Weight: 73.3 kg (161 lb 9.6 oz)  Body mass index is 26.89 kg/m².     SpO2: (!) 90 %  O2 Device (Oxygen Therapy): nasal cannula      Intake/Output Summary (Last 24 hours) at 12/8/2019 1046  Last data filed at 12/8/2019  1000  Gross per 24 hour   Intake 100 ml   Output 2075 ml   Net -1975 ml       Lines/Drains/Airways     Drain                 Urethral Catheter 12/07/19 1000 Latex 1 day          Peripheral Intravenous Line                 Peripheral IV - Single Lumen 12/05/19 2032 18 G Right Antecubital 2 days         Peripheral IV - Single Lumen 12/05/19 2329 20 G Right Forearm 2 days                Physical Exam   Constitutional: He is oriented to person, place, and time. He appears well-developed and well-nourished. No distress.   HENT:   Head: Normocephalic and atraumatic.   Eyes: Pupils are equal, round, and reactive to light.   Neck: Normal range of motion and full passive range of motion without pain. Neck supple. No JVD present. No tracheal deviation present. No thyromegaly present.   Cardiovascular: Regular rhythm, S1 normal, S2 normal and intact distal pulses. Frequent extrasystoles are present. Tachycardia present. PMI is not displaced. Exam reveals no distant heart sounds.   No murmur heard.  Pulses:       Radial pulses are 1+ on the right side, and 1+ on the left side.        Dorsalis pedis pulses are 1+ on the right side, and 1+ on the left side.   Right groin access site C/D/I, no hematoma or ecchymosis noted. No drainage or signs of infection noted.    Pulmonary/Chest: No accessory muscle usage. No respiratory distress. He has decreased breath sounds in the right lower field and the left lower field. He has no wheezes. He has no rales.   +Bipap o/n   Abdominal: Soft. Bowel sounds are normal. He exhibits no distension. There is no tenderness.   Musculoskeletal: Normal range of motion. He exhibits no edema.        Right ankle: He exhibits no swelling.        Left ankle: He exhibits no swelling.   Neurological: He is alert and oriented to person, place, and time.   Skin: Skin is warm. He is not diaphoretic. No cyanosis. No pallor. Nails show no clubbing.   Psychiatric: He has a normal mood and affect. His speech is  normal and behavior is normal. Judgment and thought content normal. Cognition and memory are normal.   Nursing note and vitals reviewed.      Significant Labs:   ABG: No results for input(s): PH, PCO2, HCO3, POCSATURATED, BE in the last 48 hours., Blood Culture: No results for input(s): LABBLOO in the last 48 hours., BMP:   Recent Labs   Lab 12/07/19  0331 12/08/19  0317 12/08/19  0839   * 122* 123*    139 139   K 4.6 5.6* 4.3    98 96   CO2 21* 27 28   BUN 30* 41* 42*   CREATININE 1.6* 1.8* 1.7*   CALCIUM 9.4 9.8 9.7   MG 1.9  --   --    , CBC   Recent Labs   Lab 12/07/19  0331 12/08/19 0317   WBC 14.32* 15.08*   HGB 16.4 16.1   HCT 49.8 50.0    262   , Troponin   Recent Labs   Lab 12/06/19  2045 12/07/19  0331 12/07/19  1148   TROPONINI >50.000* >50.000* >50.000*    and All pertinent lab results from the last 24 hours have been reviewed.    Significant Imaging: Echocardiogram:   2D echo with color flow doppler:   Results for orders placed or performed during the hospital encounter of 12/05/19   2D echo with color flow doppler   Result Value Ref Range    QEF 25 (A) 55 - 65    Pericardial Effusion SMALL (A)     Narrative    Date of Procedure: 12/07/2019        TEST DESCRIPTION   Technical Quality: This is a technically adequate study.     Aorta: The aortic root is normal in size.     Left Atrium: The left atrium is normal in size.     Left Ventricle: The left ventricle is normal in size. LV wall thickness is normal. The following segments were akinetic: apical septum, apical lateral wall, apical inferior wall, apical anterior wall.  The following segments were mildly hypokinetic: basal anterior wall.  The following segments were severely hypokinetic: mid inferoseptum, mid anterior wall.  Left ventricular systolic function appears severely depressed. Visually estimated ejection fraction is 25-30%.     Diastolic indices:     Right Atrium: The right atrium is normal in size.     Right  Ventricle: The right ventricle is normal in size. Global right ventricular systolic function appears normal.     Aortic Valve:  The aortic valve is normal in structure.     Mitral Valve:  The mitral valve is normal in structure.     Pulmonary Valve:  The pulmonic valve is not well seen.     Pericardium: There is evidence of a small postero-lateral pericardial effusion.     IVC: IVC is enlarged but collapses > 50% with a sniff, suggesting intermediate right atrial pressure of 8 mmHg.     Intracavitary: There is no evidence of intracavity mass, thrombi, or vegetation.         CONCLUSIONS     1 - Wall motion abnormalities.     2 - Severely depressed left ventricular systolic function (EF 25-30%).     3 - Normal right ventricular systolic function .     4 - Focal small pericardial effusion on right side. No tamponade    5 - Intermediate central venous pressure.     6 - Limited echo study to rule out mechanical rupture.            This document has been electronically    SIGNED BY: Lamont Andres MD On: 12/08/2019 10:28    and X-Ray: CXR: X-Ray Chest 1 View (CXR):   Results for orders placed or performed during the hospital encounter of 12/05/19   X-Ray Chest 1 View    Narrative    EXAMINATION:  XR CHEST 1 VIEW    CLINICAL HISTORY:  Congestive heart failure, CHF;    COMPARISON:  12/07/2019.    FINDINGS:  Heart size is normal.  Stable bilateral interstitial infiltrates.      Impression    Stable bilateral interstitial infiltrates.  Continued follow-up is recommended.      Electronically signed by: Devyn Schulte MD  Date:    12/08/2019  Time:    10:31    and X-Ray Chest PA and Lateral (CXR): No results found for this visit on 12/05/19.    Assessment and Plan:       * STEMI (ST elevation myocardial infarction)  75 y/o male woth PMHx for CADHx of PCI, HTN, HLP presented to [facility] ER with anterior MI and tx with TNK. Pt transferred to [facility]. Pt initially reperfused clinically but later on with CP.  Pt taken to the cath lab  for postinfarct angina.    12/6/19  -s/p PCI of LAD per Dr. Alcazar  -ECHO pending  -Continue ASA, Statin, BB, ACEi, Plavix  -Check FLP  -Keep in ICU for today  -Dash diet, 2 gm sodium restriction  -1.5L Fluid restriction  -Repeat labs in AM    12/7/19  -Echo revealed EF 15-20%, +WMA's, PHTN, DD  -Add Tridil gtt today  -Stop ACEi today given need for Tridil gtt for symptom management   -Continue ASA, Statin, Plavix, BB, IV lasix BID, Tridil gtt  -Further recs to follow pending clinical course    12/8/19  -Continue ASA, Statin, Plavix, IV lasix BID, IV lopressor q6H  -Add ramipril if BP can tolerate  -keep in ICU for close monitoring given clinical condition with little/no improvement    Leukocytosis, likely reactive   -mgmt per primary team    PENNY (acute kidney injury)  -decrease IV lasix to BID  -Repeat BMP in AM    Acute systolic congestive heart failure, NYHA class 3  -EF 15-20%, +WMA, PHTN  -Continue IV lasix BID, IV lopressor Q6H  -Use Ramipril if BP can tolerate  -Dash diet, 2 gm sodium restriction  -1200 ml fluid restriction  -Daily weights  -Strict intake and output  -Avoid exertional activities  -CXR stable  -Reassess in AM    YESENIA (obstructive sleep apnea)  -continue nightly cpap    Paroxysmal VT  Continue BB  Keep K+ >4 and Mag >2  Continue telemetry monitoring  Keep in ICU for now    12/7  -continue BB    12/8  -keep K+>4 and Mag>2  -Continue BB as tolerated    Mixed hyperlipidemia  Continue statin  Check FLP        VTE Risk Mitigation (From admission, onward)         Ordered     enoxaparin injection 40 mg  Daily      12/06/19 0910                Ana Woodard, AQUILINOP-C  Cardiology  [facility]

## 2019-12-09 PROBLEM — I48.92 ATRIAL FLUTTER: Status: ACTIVE | Noted: 2019-01-01

## 2019-12-09 NOTE — ASSESSMENT & PLAN NOTE
- Likely reactive   -Procalcitonin is marginally elevated   -CXR- 12/8/19- bilateral interstitial infiltrate likely  due to pul congestion related to acute CHF   -Get Blood cultures x 2  -Get UA   -Monitor   -Resolved - 12/9/19

## 2019-12-09 NOTE — PROCEDURES
"Raleigh Walsh is a 74 y.o. male patient.    Temp: 98.2 °F (36.8 °C) (12/09/19 1105)  Pulse: 99 (12/09/19 1200)  Resp: 11 (12/09/19 1200)  BP: (!) 88/62 (12/09/19 1200)  SpO2: 98 % (12/09/19 1200)  Weight: 73.3 kg (161 lb 9.6 oz) (12/06/19 0106)  Height: 5' 5" (165.1 cm) (12/05/19 2045)       PICC Line placement  Date/Time: 12/9/2019 8:20 AM  Location procedure was performed: [facility] INTENSIVE CARE UNIT  Performed by: CYNTHIA Pa  Pre-operative Diagnosis: atrial flutter with RVR  Post-operative diagnosis: atrial flutter with RVR  Consent Done: Yes  Time out: Immediately prior to procedure a "time out" was called to verify the correct patient, procedure, equipment, support staff and site/side marked as required.  Indications: med administration  Anesthesia: local infiltration    Anesthesia:  Local Anesthetic: lidocaine 1% without epinephrine  Anesthetic total: 3 mL  Preparation: skin prepped with ChloraPrep  Skin prep agent dried: skin prep agent completely dried prior to procedure  Sterile barriers: all five maximum sterile barriers used - cap, mask, sterile gown, sterile gloves, and large sterile sheet  Hand hygiene: hand hygiene performed prior to central venous catheter insertion  Location details: left basilic  Catheter type: double lumen  Catheter size: 5 Fr  Catheter Length: 36cm    Ultrasound guidance: yes  Vessel Caliber: large, compressibility normal  Needle advanced into vessel with real time Ultrasound guidance.  Guidewire confirmed in vessel.  Sterile sheath used.  Manometry: No   Number of attempts: 1  Assessment: placement verified by x-ray and successful placement  Complications: none  Specimens: No  Implants: No  Post-procedure: chlorhexidine patch,  sterile dressing applied and blood return through all ports  Complications: No          Ana Coon  12/9/2019  "

## 2019-12-09 NOTE — ASSESSMENT & PLAN NOTE
-Baseline creatinine 1.1 to 1.2  -Monitor Renal indices while on diuretic therapy   -Stable at 1.5 to 1.7

## 2019-12-09 NOTE — ASSESSMENT & PLAN NOTE
73 y/o male woth PMHx for CADHx of PCI, HTN, HLP presented to [facility] ER with anterior MI and tx with TNK. Pt transferred to [facility]. Pt initially reperfused clinically but later on with CP.  Pt taken to the cath lab for postinfarct angina.    12/6/19  -s/p PCI of LAD per Dr. Alcazar  -ECHO pending  -Continue ASA, Statin, BB, ACEi, Plavix  -Check FLP  -Keep in ICU for today  -Dash diet, 2 gm sodium restriction  -1.5L Fluid restriction  -Repeat labs in AM    12/7/19  -Echo revealed EF 15-20%, +WMA's, PHTN, DD  -Add Tridil gtt today  -Stop ACEi today given need for Tridil gtt for symptom management   -Continue ASA, Statin, Plavix, BB, IV lasix BID, Tridil gtt  -Further recs to follow pending clinical course    12/8/19  -Continue ASA, Statin, Plavix, IV lasix BID, IV lopressor q6H  -Add ramipril if BP can tolerate  -keep in ICU for close monitoring given clinical condition with little/no improvement    12/9/19  -No chest pain overnight, SOB slowly improving  -Continue ASA, statin, Plavix, IV Lasix, IV lopressor  -Hold ACEi if needed given borderline BP  -Keep in ICU

## 2019-12-09 NOTE — ASSESSMENT & PLAN NOTE
STEMI   S/p PCI with JOSE to proximal LAD (See operative Note)  Continue ASA/Statin/BB/Plavix  Topical nitrites;  Supplemental oxygen;   Consider CPAP for desaturations  Pain relief measures: hydrocodone and Prn morphine  Continue as per Cardiology;  Trend troponin   12/8/19-  Continue ASA, Plavix , high intensity statin, BB and ACEI   12/9/19-  Persistent ST elevation is noted ant leads suggestive of apical aneurysm and poor LV recovery   Continue ASA, Plavix, Statin, BB, ACEI

## 2019-12-09 NOTE — PROGRESS NOTES
[facility] -   Critical Care Medicine  Progress Note    Patient Name: Raleigh Walsh  MRN: [id number]  Admission Date: 12/5/2019  Hospital Length of Stay: 4 days  Code Status: No Order  Attending Provider: Gilmar Florence MD  Primary Care Provider: Akhil Smith MD   Principal Problem: STEMI (ST elevation myocardial infarction)    Subjective:     HPI:  74 year old male with PMH including CAD s/p MI with stent to LAD; HTN; HLD; depression; insomnia; reports YESENIA diagnosis and has CPAP but has not routinely used  Presented to [facility] ED with CP and EKG showed STEMI  tnkase given at 2046 and he was urgently transferred here for evaluation by cardiology  Taken for [facility] after arrival at this facility  PCI of LAD stent with balloon + new LAD stent perfromed    Hospital/ICU Course:  Admitted to ICU overnight post C  Intermittent complaints of chest discomfort along with occasional reperfusion ectopy on cardiac monitor  Intermittent nausea/vomiting throughout today  12/7 - tachycardia, anxiety reported overnight; this am tachycardia, chest pressure, SOB with transition off nocturnal CPAP to NC; troponin remains > 50k  12/8 - less pronounced dyspnea and tachycardia today; Dr Andres reports no decline on repeat echo last evening; afebrile; diuresed 1.5L  12/9 - overnight ST converted to atrial flutter with RVR, amiodarone and heparin infusions initiated; BP remains marginal; intermittent dyspnea with some improvement    Review of Systems   Constitutional: Positive for malaise/fatigue. Negative for chills and fever.   Respiratory: Positive for shortness of breath (intermittent).    Cardiovascular: Negative for orthopnea and leg swelling.        Intermittent chest tightness   Genitourinary:        New in place   Musculoskeletal: Negative.    Neurological: Negative for dizziness and focal weakness.   Psychiatric/Behavioral: The patient is nervous/anxious.          Objective:     Vital Signs (Most  Recent):  Temp: 98.2 °F (36.8 °C) (12/09/19 1105)  Pulse: 99 (12/09/19 1200)  Resp: 11 (12/09/19 1200)  BP: (!) 88/62 (12/09/19 1200)  SpO2: 98 % (12/09/19 1200) Vital Signs (24h Range):  Temp:  [96.5 °F (35.8 °C)-98.2 °F (36.8 °C)] 98.2 °F (36.8 °C)  Pulse:  [] 99  Resp:  [11-22] 11  SpO2:  [88 %-100 %] 98 %  BP: ()/(28-90) 88/62     Weight: 73.3 kg (161 lb 9.6 oz)  Body mass index is 26.89 kg/m².      Intake/Output Summary (Last 24 hours) at 12/9/2019 1316  Last data filed at 12/9/2019 1200  Gross per 24 hour   Intake 957.66 ml   Output 990 ml   Net -32.34 ml       Physical Exam   Constitutional: He is oriented to person, place, and time. He appears ill. Nasal cannula in place.   HENT:   Head: Atraumatic.   Eyes: Pupils are equal, round, and reactive to light. Conjunctivae are normal.   Neck: No tracheal deviation present.   Cardiovascular: Normal rate. A regularly irregular rhythm present.   No murmur heard.  Pulses:       Radial pulses are 2+ on the right side, and 2+ on the left side.        Dorsalis pedis pulses are 2+ on the right side, and 2+ on the left side.   Pulmonary/Chest: Effort normal. He has decreased breath sounds. He has no wheezes. He has no rales.   Abdominal: Soft. Bowel sounds are normal.   Musculoskeletal: He exhibits no edema.   Neurological: He is alert and oriented to person, place, and time.   Skin: Skin is warm and dry. Capillary refill takes less than 2 seconds.            Vents:  Oxygen Concentration (%): 40 (12/09/19 1031)    Lines/Drains/Airways     Peripherally Inserted Central Catheter Line                 PICC Double Lumen 12/09/19 0820 right brachial less than 1 day          Drain                 Urethral Catheter 12/07/19 1000 Latex 2 days          Peripheral Intravenous Line                 Peripheral IV - Single Lumen 12/05/19 2032 18 G Right Antecubital 3 days                Significant Labs:    CBC/Anemia Profile:  Recent Labs   Lab 12/08/19  0317 12/09/19  9238    WBC 15.08* 12.36   HGB 16.1 14.7   HCT 50.0 45.5    230   MCV 98 98   RDW 13.1 13.2        Chemistries:  Recent Labs   Lab 12/08/19  0317 12/08/19  0839 12/09/19  0321 12/09/19  1124    139 138 135*   K 5.6* 4.3 3.9 3.8   CL 98 96 96 95   CO2 27 28 28 29   BUN 41* 42* 49* 50*   CREATININE 1.8* 1.7* 1.5* 1.4   CALCIUM 9.8 9.7 9.1 8.7   ALBUMIN 3.3*  --  2.9*  --    PROT 7.2  --  6.7  --    BILITOT 1.7*  --  1.5*  --    ALKPHOS 82  --  80  --    ALT 76*  --  52*  --    *  --  78*  --    MG  --   --  2.6  --        All pertinent labs within the past 24 hours have been reviewed.    Significant Imaging:  I have reviewed all pertinent imaging results/findings within the past 24 hours.      ABG  No results for input(s): PH, PO2, PCO2, HCO3, BE in the last 168 hours.  Assessment/Plan:     Cardiac/Vascular  * STEMI (ST elevation myocardial infarction)  Post PCI  Echo shows severely reduced EF 15%, diastolic dysfunction, and elevated PA pressure  On ASA, plavix, statin, BB and ACEi  Concern for intermittent distress, continued troponin elevation, and EKG findings; discussed with cardiology - additional IV BB given; tridil infusion trial failed with hypotension; may need more aggressive support  Continue close ICU hemodynamic monitoring  12/8 - no significant changes overnight; continue BB, ASA, statin, ACEi; continue ICU hemodynamic monitoring  12/9 - continue BB, ASA, statin, ICU hemodynamic monitoring per cards    Atrial flutter, New- onset   Amiodarone infusion per protocol  Full anticoagulation with heparin infusion    Paroxysmal VT  Decreased ectopy and short runs of v tach through night; none seen to now this morning  Continue BB  12/8 - ICU hemodynamic monitoring  12/9 - no ventricular ectopy seen today    Renal/  PENNY (acute kidney injury)  Likely s/t cardiogenic shock; monitor trend  Trending to normal, continue supportive care    Oncology  Leukocytosis, likely reactive   Suspect  reactive  Encourage IS and mobilize  resolved    Other  YESENIA (obstructive sleep apnea)  Nocturnal CPAP ordered       Critical Care Daily Checklist:    A: Awake: RASS Goal/Actual Goal:    Actual: Musa Agitation Sedation Scale (RASS): Alert and calm   B: Spontaneous Breathing Trial Performed?     C: SAT & SBT Coordinated?  n/a                      D: Delirium: CAM-ICU Overall CAM-ICU: Negative   E: Early Mobility Performed? yes   F: Feeding Goal:    Status:     Current Diet Order   Procedures    Diet Cardiac     Fluid restriction 1500 ml/day      AS: Analgesia/Sedation prn   T: Thromboembolic Prophylaxis heparin   H: HOB > 300 Yes   U: Stress Ulcer Prophylaxis (if needed) pepcid   G: Glucose Control monitor   B: Bowel Function Stool Occurrence: 1   I: Indwelling Catheter (Lines & New) Necessity reviewed   D: De-escalation of Antimicrobials/Pharmacotherapies reviewed    Plan for the day/ETD As above    Family/Goals of Care: Home on discharge   I have discussed case and plan of care in detail with Dr Faust; Status and plan of care were discussed with team on multidisciplinary rounds.    Critical Care Time: 40 minutes   Critical care was time spent personally by me on the following activities: development of treatment plan with patient or surrogate and bedside caregivers, discussions with consultants, evaluation of patient's response to treatment, examination of patient, ordering and performing treatments and interventions, ordering and review of laboratory studies, ordering and review of radiographic studies, pulse oximetry, re-evaluation of patient's condition. This critical care time did not overlap with that of any other provider or involve time for any procedures.     MARIELENA Pa-BC  Critical Care Medicine  [facility]

## 2019-12-09 NOTE — PROGRESS NOTES
D/w RN and Dr Andres    HR high- agrees with amiodarone- no bolus planned due to BP  Cardiologist Dr Andres will call the ICU and order amiodarone  K low dose replete  Check trop I   ST-T changes probably from aneurysmal change per Dr Andres  Pt asleep on NIV with good volumes on video  EICU available to assist

## 2019-12-09 NOTE — PROGRESS NOTES
[facility] -   Cardiology  Progress Note    Patient Name: Raleigh Walsh  MRN: [id number]  Admission Date: 12/5/2019  Hospital Length of Stay: 4 days  Code Status: No Order   Attending Physician: Gilmar Florence MD   Primary Care Physician: Akhil Smith MD  Expected Discharge Date:   Principal Problem:STEMI (ST elevation myocardial infarction)    Subjective:   HPI:  75 y/o male woth PMHx for CADHx of PCI, HTN, HLP presented to [facility] ER with anterior MI and tx with TNK. [facility] Pt initially reperfused clinically but later on with CP.  Pt taken to the cath lab for postinfarct angina.    Hospital Course:   12/6/19--Patient seen and examined in room, lying in bed. Continues to complain of intermittent chest discomfort today but much improved since PCI. He was noted to have 8-10 beats of VT on monitor at time of exam this AM. Keep in ICU for now. Labs reviewed, K+ 4.8, Cr 1.4, Troponin >50.     12/7/19--Patient seen and examined in ICU, lying in bed. Had multiple episodes of shortness of breath, palpitations with diaphoresis noted o/n. HR at time of exam 110s. Will optimize medical regimen for CAD/CHF today. Discussed with patient and family at bedside given guarded prognosis given ICM post STEMI. Will add Tridil gtt today and reassess response.     12/8/19--Patient seen and examined in ICU today. Reports continued shortness of breath today but slightly/marginally improved from yesterday. HR remains elevated today in 110-120s, EKG revealed ST with PACs today. Patient unable to tolerate Tridil gtt yesterday due to hypotension. Will try to optimize medical regimen today but low BP and elevated HR makes medical therapy challenging. Labs reviewed, K+ 4.3, Cr 1.7, BUN 42. Repeat CXR ordered.     12/9/19-Patient seen and examined today. Still SOB and fatigued, some improvement overnight. Denies chest pain. Converted to aflutter this AM, amiodarone drip initiated without bolus. Labs  reviewed. Creatinine 1.5, liver enzymes improving. Troponin trending down. BP remains marginal.       Review of Systems   Constitution: Positive for malaise/fatigue.   HENT: Negative.    Eyes: Negative.    Cardiovascular: Positive for dyspnea on exertion and orthopnea.   Respiratory: Positive for shortness of breath.    Endocrine: Negative.    Hematologic/Lymphatic: Negative.    Skin: Negative.    Musculoskeletal: Negative.    Gastrointestinal: Negative.    Genitourinary: Negative.    Neurological: Positive for weakness.   Psychiatric/Behavioral: Negative.    Allergic/Immunologic: Negative.      Objective:     Vital Signs (Most Recent):  Temp: 96.5 °F (35.8 °C) (12/09/19 0705)  Pulse: 82 (12/09/19 1105)  Resp: 18 (12/09/19 1105)  BP: (!) 87/65 (12/09/19 1105)  SpO2: 100 % (12/09/19 1105) Vital Signs (24h Range):  Temp:  [96.5 °F (35.8 °C)-98 °F (36.7 °C)] 96.5 °F (35.8 °C)  Pulse:  [] 82  Resp:  [13-24] 18  SpO2:  [88 %-100 %] 100 %  BP: ()/(28-90) 87/65     Weight: 73.3 kg (161 lb 9.6 oz)  Body mass index is 26.89 kg/m².     SpO2: 100 %  O2 Device (Oxygen Therapy): nasal cannula      Intake/Output Summary (Last 24 hours) at 12/9/2019 1115  Last data filed at 12/9/2019 0709  Gross per 24 hour   Intake 869.69 ml   Output 815 ml   Net 54.69 ml       Lines/Drains/Airways     Peripherally Inserted Central Catheter Line                 PICC Double Lumen 12/09/19 0820 right brachial less than 1 day          Drain                 Urethral Catheter 12/07/19 1000 Latex 2 days          Peripheral Intravenous Line                 Peripheral IV - Single Lumen 12/05/19 2032 18 G Right Antecubital 3 days                Physical Exam   Constitutional: He is oriented to person, place, and time. He appears well-developed and well-nourished. He appears distressed.   Ill appearing   HENT:   Head: Normocephalic and atraumatic.   Eyes: Pupils are equal, round, and reactive to light. Right eye exhibits no discharge. Left eye  exhibits no discharge.   Neck: Neck supple. No JVD present.   Cardiovascular: Normal rate, S1 normal, S2 normal and normal heart sounds. An irregularly irregular rhythm present.   No murmur heard.  Pulmonary/Chest: Effort normal. No respiratory distress. He has no wheezes. He has rales.   Abdominal: Soft. He exhibits no distension.   Musculoskeletal: He exhibits no edema.   Neurological: He is alert and oriented to person, place, and time.   Skin: Skin is warm and dry. He is not diaphoretic. No erythema.   Right groin access site C/D/I; no bleeding erythema or drainage   Psychiatric: He has a normal mood and affect. His behavior is normal. Thought content normal.   Nursing note and vitals reviewed.      Significant Labs:   CMP   Recent Labs   Lab 12/08/19 0317 12/08/19  0839 12/09/19  0321    139 138   K 5.6* 4.3 3.9   CL 98 96 96   CO2 27 28 28   * 123* 97   BUN 41* 42* 49*   CREATININE 1.8* 1.7* 1.5*   CALCIUM 9.8 9.7 9.1   PROT 7.2  --  6.7   ALBUMIN 3.3*  --  2.9*   BILITOT 1.7*  --  1.5*   ALKPHOS 82  --  80   *  --  78*   ALT 76*  --  52*   ANIONGAP 14 15 14   ESTGFRAFRICA 42* 45* 53*   EGFRNONAA 37* 39* 46*   , CBC   Recent Labs   Lab 12/08/19  0317 12/09/19  0322   WBC 15.08* 12.36   HGB 16.1 14.7   HCT 50.0 45.5    230   , Troponin   Recent Labs   Lab 12/07/19  1148 12/09/19  0534   TROPONINI >50.000* 38.671*    and All pertinent lab results from the last 24 hours have been reviewed.    Significant Imaging: Echocardiogram:   2D echo with color flow doppler:   Results for orders placed or performed during the hospital encounter of 12/05/19   2D echo with color flow doppler   Result Value Ref Range    QEF 25 (A) 55 - 65    Pericardial Effusion SMALL (A)     Narrative    Date of Procedure: 12/07/2019        TEST DESCRIPTION   Technical Quality: This is a technically adequate study.     Aorta: The aortic root is normal in size.     Left Atrium: The left atrium is normal in size.      Left Ventricle: The left ventricle is normal in size. LV wall thickness is normal. The following segments were akinetic: apical septum, apical lateral wall, apical inferior wall, apical anterior wall.  The following segments were mildly hypokinetic: basal anterior wall.  The following segments were severely hypokinetic: mid inferoseptum, mid anterior wall.  Left ventricular systolic function appears severely depressed. Visually estimated ejection fraction is 25-30%.     Diastolic indices:     Right Atrium: The right atrium is normal in size.     Right Ventricle: The right ventricle is normal in size. Global right ventricular systolic function appears normal.     Aortic Valve:  The aortic valve is normal in structure.     Mitral Valve:  The mitral valve is normal in structure.     Pulmonary Valve:  The pulmonic valve is not well seen.     Pericardium: There is evidence of a small postero-lateral pericardial effusion.     IVC: IVC is enlarged but collapses > 50% with a sniff, suggesting intermediate right atrial pressure of 8 mmHg.     Intracavitary: There is no evidence of intracavity mass, thrombi, or vegetation.         CONCLUSIONS     1 - Wall motion abnormalities.     2 - Severely depressed left ventricular systolic function (EF 25-30%).     3 - Normal right ventricular systolic function .     4 - Focal small pericardial effusion on right side. No tamponade    5 - Intermediate central venous pressure.     6 - Limited echo study to rule out mechanical rupture.            This document has been electronically    SIGNED BY: Lamont Andres MD On: 12/08/2019 10:28   , EKG: Reviewed and X-Ray: CXR: X-Ray Chest 1 View (CXR):   Results for orders placed or performed during the hospital encounter of 12/05/19   X-Ray Chest 1 View    Narrative    EXAMINATION:  XR CHEST 1 VIEW    CLINICAL HISTORY:  PICC placement;    TECHNIQUE:  Single frontal view of the chest was performed.    COMPARISON:  Chest radiograph 12/08/2019 with  [facility]    FINDINGS:  Cardiac leads project over the chest.  Interval placement of a right-sided peripherally inserted central venous catheter with tip projecting in the SVC.  Cardiomediastinal silhouette is within normal limits and stable.  Persistent interstitial infiltrates.  No large consolidative opacity, effusion or pneumothorax.  Osseous structures appear intact.      Impression    Interval placement of a right-sided PICC with tip projecting in the SVC.    Stable bilateral interstitial infiltrates.      Electronically signed by: Henry Martin  Date:    12/09/2019  Time:    09:17    and X-Ray Chest PA and Lateral (CXR): No results found for this visit on 12/05/19.    Assessment and Plan:   Patient progressing slowly s/p STEMI/ICM. Converted to aflutter overnight. Continue same mgmt with amio and heparin. Diurese. Assess response.     * STEMI (ST elevation myocardial infarction)  73 y/o male woth PMHx for CADHx of PCI, HTN, HLP [facility]. [facility]. Pt initially reperfused clinically but later on with CP.  Pt taken to the cath lab for postinfarct angina.    12/6/19  -s/p PCI of LAD per Dr. Alcazar  -ECHO pending  -Continue ASA, Statin, BB, ACEi, Plavix  -Check FLP  -Keep in ICU for today  -Dash diet, 2 gm sodium restriction  -1.5L Fluid restriction  -Repeat labs in AM    12/7/19  -Echo revealed EF 15-20%, +WMA's, PHTN, DD  -Add Tridil gtt today  -Stop ACEi today given need for Tridil gtt for symptom management   -Continue ASA, Statin, Plavix, BB, IV lasix BID, Tridil gtt  -Further recs to follow pending clinical course    12/8/19  -Continue ASA, Statin, Plavix, IV lasix BID, IV lopressor q6H  -Add ramipril if BP can tolerate  -keep in ICU for close monitoring given clinical condition with little/no improvement    12/9/19  -No chest pain overnight, SOB slowly improving  -Continue ASA, statin, Plavix, IV Lasix, IV lopressor  -Hold ACEi if needed given  borderline BP  -Keep in ICU    Atrial flutter, New- onset   -HR controlled at time of exam  -Continue amio gtt  -Continue BB as BP permits  -Continue heparin gtt    Leukocytosis, likely reactive   -Mgmt per primary team    PENNY (acute kidney injury)  -Creatinine improving  -Repeat BMP in AM    Acute systolic congestive heart failure, NYHA class 3  -EF 15-20%, +WMA, PHTN  -Continue IV lasix BID, IV lopressor Q6H  -Use Ramipril if BP can tolerate  -Dash diet, 2 gm sodium restriction  -1200 ml fluid restriction  -Daily weights  -Strict intake and output  -Avoid exertional activities  -CXR stable  -Reassess in AM    12/9/19  -Slowly improving  -Continue IV diuresis  -Continue BB and ACEi as BP permits  -Optimization of medical therapy challenging given hypotension     YESENIA (obstructive sleep apnea)  -continue nightly cpap    Paroxysmal VT  Continue BB  Keep K+ >4 and Mag >2  Continue telemetry monitoring  Keep in ICU for now    12/7  -continue BB    12/8  -keep K+>4 and Mag>2  -Continue BB as tolerated    12/9/19  -Keep electrolytes WNL  -Continue BB as BP permits    Mixed hyperlipidemia  -Continue statin  -Check FLP        VTE Risk Mitigation (From admission, onward)         Ordered     heparin 25,000 units in dextrose 5% 250 mL (100 units/mL) infusion LOW INTENSITY nomogram - [facility]  Continuous     Question:  Heparin Infusion Adjustment (DO NOT MODIFY ANSWER)  Answer:  [email or web address]\Images\[email or web address]\HeparinInfusions\heparin LOW INTENSITY nomogram for [facility] [id number]    12/09/19 0628     heparin 25,000 units in dextrose 5% (100 units/ml) IV bolus from bag - ADDITIONAL PRN BOLUS - 30 units/kg  As needed (PRN)     Question:  Heparin Infusion Adjustment (DO NOT MODIFY ANSWER)  Answer:  [email or web address]\Images\[email or web address]\[email or web address]\heparin LOW INTENSITY nomogram for [facility] [id number]    12/09/19 0628     heparin 25,000 units in dextrose 5% (100 units/ml) IV bolus from bag - ADDITIONAL PRN BOLUS - 60 units/kg  As needed  (PRN)     Question:  Heparin Infusion Adjustment (DO NOT MODIFY ANSWER)  Answer:  [email or web address]\Images\[email or web address]\HeparinInfusions\heparin LOW INTENSITY nomogram for [facility] [id number]    12/09/19 0628                Veronica Raygoza PA-C  Cardiology  [facility]

## 2019-12-09 NOTE — ASSESSMENT & PLAN NOTE
-EF 15-20%, +WMA, PHTN  -Continue IV lasix BID, IV lopressor Q6H  -Use Ramipril if BP can tolerate  -Dash diet, 2 gm sodium restriction  -1200 ml fluid restriction  -Daily weights  -Strict intake and output  -Avoid exertional activities  -CXR stable  -Reassess in AM    12/9/19  -Slowly improving  -Continue IV diuresis  -Continue BB and ACEi as BP permits  -Optimization of medical therapy challenging given hypotension

## 2019-12-09 NOTE — PROGRESS NOTES
[facility] Hospital Medicine  Progress Note    Patient Name: Raleigh Walsh  MRN: [id number]  Patient Class: IP- Inpatient   Admission Date: 12/5/2019  Length of Stay: 4 days  Attending Physician: Gilmar Florence MD  Primary Care Provider: Akhil Smith MD        Subjective:     Principal Problem:STEMI (ST elevation myocardial infarction)        HPI:  73 YO WM with known CAD S/P MI and prior stenting;  Presented to ED at [facility] after experiencing chest pain while at a ballgame; He ruled in foe a STEMI; was treated with TPA and transferred here; He was taken to the cath lab where he underwent PCI with stent placed to the proximal LAD by Dr. Alcazar.  He was transferred to the ICU in stable condition.    Overview/Hospital Course:  12/7/19-  Pt c/o new onset SOB on minimal exertion . Denies chest pain.   Remains on O2 at 5L/min. Pt is noted to be tachycardic and tachypnic.   Episodes of nausea and emesis x 3 yesterday but none today   EKG revealed sinus tachycardia , ST elevation ant and lat leads and no longer PVC's   Echo revealed severely depressed left vent systolic function with EF of 15-20%, PA pressure 43.   Cardiology is recommending diuresis with IV furosemide , continue BB and start Tridil drip.   Prognosis is guarded at this time.   12/8/19-  Pt is seen at bedside . Remains mildly tachycardic and tachypnic .  Reports SOB is better today . Good urine output noted with diuresis 1.5 L yesterday   Remains on O2 at 5L/min. Tolerated BiPAP well last night.     Tridil drip discontinued yesterday due to hypotension   Remains on low dose BB and Ramipril restarted at 2.5 mg daily   Prognosis guarded.     12/9/19-  Overnight developed Atrial flutter and started on Amiodarone ggt and Heparin ggt. Amiodarone bolus was not given due to marginal BP.   On BB . Rate is fairly controlled.  EKG revealed persistent ST elevation anterior leads     Remains on O2 at 5 L/min via NC and nightly BiPAP  Good  urine output 1.3 L yesterday   Leukocytosis has resolved . Blood cultures are negative     Pt denies chest pain . Reports SOB is better at rest     Interval History:   Overnight developed Atrial flutter and started on Amiodarone ggt and Heparin ggt. Amiodarone bolus was not given due to marginal BP.   On BB . Rate is fairly controlled.  EKG revealed persistent ST elevation anterior leads  Suggestive of apical aneurysm and poor LV recovery.     Remains on O2 at 5 L/min via NC and nightly BiPAP  Good urine output 1.3 L yesterday   Leukocytosis has resolved . Blood cultures are negative     Review of Systems   Constitutional: Positive for activity change and appetite change. Negative for fever.   HENT: Negative for sore throat.    Eyes: Negative for visual disturbance.   Respiratory: Positive for shortness of breath. Negative for cough and chest tightness.    Cardiovascular: Negative for chest pain, palpitations and leg swelling.   Gastrointestinal: Negative for abdominal distention, abdominal pain, constipation, diarrhea, nausea and vomiting.   Endocrine: Negative for polyuria.   Genitourinary: Negative for decreased urine volume, dysuria, flank pain, frequency and hematuria.   Musculoskeletal: Negative for back pain and gait problem.   Skin: Negative for rash.   Neurological: Negative for syncope, speech difficulty, weakness, light-headedness and headaches.   Psychiatric/Behavioral: Negative for confusion, hallucinations and sleep disturbance.     Objective:     Vital Signs (Most Recent):  Temp: 96.5 °F (35.8 °C) (12/09/19 0705)  Pulse: 73 (12/09/19 1031)  Resp: 20 (12/09/19 1031)  BP: 101/71 (12/09/19 0705)  SpO2: 100 % (12/09/19 1031) Vital Signs (24h Range):  Temp:  [96.5 °F (35.8 °C)-98 °F (36.7 °C)] 96.5 °F (35.8 °C)  Pulse:  [] 73  Resp:  [13-24] 20  SpO2:  [88 %-100 %] 100 %  BP: ()/(28-90) 101/71     Weight: 73.3 kg (161 lb 9.6 oz)  Body mass index is 26.89 kg/m².    Intake/Output Summary (Last 24  hours) at 12/9/2019 1104  Last data filed at 12/9/2019 0709  Gross per 24 hour   Intake 869.69 ml   Output 890 ml   Net -20.31 ml      Physical Exam   Constitutional: He is oriented to person, place, and time. He appears well-developed and well-nourished. No distress.   HENT:   Head: Normocephalic and atraumatic.   Mouth/Throat: No oropharyngeal exudate.   Eyes: Pupils are equal, round, and reactive to light. Conjunctivae and EOM are normal.   Neck: Normal range of motion. Neck supple. No JVD present. No thyromegaly present.   Cardiovascular: Normal rate and normal heart sounds.   No murmur heard.  Pulmonary/Chest: Effort normal. No respiratory distress. He has no wheezes. He has rales (at bases . clear upper lungs ). He exhibits no tenderness.   NC in place    Abdominal: Soft. Bowel sounds are normal. He exhibits no distension. There is no tenderness. There is no rebound and no guarding.   Musculoskeletal: Normal range of motion. He exhibits no edema.   Lymphadenopathy:     He has no cervical adenopathy.   Neurological: He is alert and oriented to person, place, and time. He displays normal reflexes. No cranial nerve deficit or sensory deficit.   Skin: Skin is warm and dry. No rash noted. He is not diaphoretic.   Psychiatric: He has a normal mood and affect.       Significant Labs:   CBC:   Recent Labs   Lab 12/08/19  0317 12/09/19  0322   WBC 15.08* 12.36   HGB 16.1 14.7   HCT 50.0 45.5    230     CMP:   Recent Labs   Lab 12/08/19  0317 12/08/19  0839 12/09/19  0321    139 138   K 5.6* 4.3 3.9   CL 98 96 96   CO2 27 28 28   * 123* 97   BUN 41* 42* 49*   CREATININE 1.8* 1.7* 1.5*   CALCIUM 9.8 9.7 9.1   PROT 7.2  --  6.7   ALBUMIN 3.3*  --  2.9*   BILITOT 1.7*  --  1.5*   ALKPHOS 82  --  80   *  --  78*   ALT 76*  --  52*   ANIONGAP 14 15 14   EGFRNONAA 37* 39* 46*       Significant Imaging:       Assessment/Plan:      * STEMI (ST elevation myocardial infarction)  STEMI   S/p PCI with JOSE  to proximal LAD (See operative Note)  Continue ASA/Statin/BB/Plavix  Topical nitrites;  Supplemental oxygen;   Consider CPAP for desaturations  Pain relief measures: hydrocodone and Prn morphine  Continue as per Cardiology;  Trend troponin   12/8/19-  Continue ASA, Plavix , high intensity statin, BB and ACEI   12/9/19-  Persistent ST elevation is noted ant leads suggestive of apical aneurysm and poor LV recovery   Continue ASA, Plavix, Statin, BB, ACEI     Acute systolic congestive heart failure, NYHA class 3  - New onset Post -MI   -Continue Diuresis   -Continue BB  -ACEI is discontinued by Cardiology due to hypotension or marginal BP   -Close monitoring in ICU   12/8/19-  Good diuresis is noted with IV Furosemide   Started back on ACEI   Continue BB  12/9/19-  Continue diuresis , BB, ACEI       Elevated LFTs  -Likely hepatic congestion due to acute CHF.   -Continue Diuresis   -Improving with diuresis     Paroxysmal VT  - Likely reperfusion arrhythmia   -continue BB      PENNY (acute kidney injury)  -Baseline creatinine 1.1 to 1.2  -Monitor Renal indices while on diuretic therapy   -Stable at 1.5 to 1.7       Atrial flutter, New- onset   12/9/19  - New onset Atrial flutter  Post MI/ Post PCI   -Started on Amiodarone ggt and Heparin ggt  -Continue BB  -Continue ACEI        Leukocytosis, likely reactive   - Likely reactive   -Procalcitonin is marginally elevated   -CXR- 12/8/19- bilateral interstitial infiltrate likely  due to pul congestion related to acute CHF   -Get Blood cultures x 2  -Get UA   -Monitor   -Resolved - 12/9/19      YESENIA (obstructive sleep apnea)  -Encouraged nightly C-PAP        VTE Risk Mitigation (From admission, onward)         Ordered     heparin 25,000 units in dextrose 5% 250 mL (100 units/mL) infusion LOW INTENSITY nomogram - [facility]  Continuous     Question:  Heparin Infusion Adjustment (DO NOT MODIFY ANSWER)  Answer:  \\[email or web address]\epic\Images\Pharmacy\HeparinInfusions\heparin LOW INTENSITY  nomogram for [facility] [id number]    12/09/19 0628     heparin 25,000 units in dextrose 5% (100 units/ml) IV bolus from bag - ADDITIONAL PRN BOLUS - 30 units/kg  As needed (PRN)     Question:  Heparin Infusion Adjustment (DO NOT MODIFY ANSWER)  Answer:  \\[employer]\epic\Images\Pharmacy\HeparinInfusions\heparin LOW INTENSITY nomogram for [facility] [id number].pdf    12/09/19 0628     heparin 25,000 units in dextrose 5% (100 units/ml) IV bolus from bag - ADDITIONAL PRN BOLUS - 60 units/kg  As needed (PRN)     Question:  Heparin Infusion Adjustment (DO NOT MODIFY ANSWER)  Answer:  \\[email or web address]\epic\Images\Pharmacy\HeparinInfusions\heparin LOW INTENSITY nomogram for [facility] [id number].pdf    12/09/19 0628                Critical care time spent on the evaluation and treatment of severe organ dysfunction, review of pertinent labs and imaging studies, discussions with consulting providers and discussions with patient/family: 30  minutes.      Gilmar Florence MD  Department of Hospital Medicine   [facility]

## 2019-12-09 NOTE — SUBJECTIVE & OBJECTIVE
Interval History:   Overnight developed Atrial flutter and started on Amiodarone ggt and Heparin ggt. Amiodarone bolus was not given due to marginal BP.   On BB . Rate is fairly controlled.  EKG revealed persistent ST elevation anterior leads  Suggestive of apical aneurysm and poor LV recovery.     Remains on O2 at 5 L/min via NC and nightly BiPAP  Good urine output 1.3 L yesterday   Leukocytosis has resolved . Blood cultures are negative     Review of Systems   Constitutional: Positive for activity change and appetite change. Negative for fever.   HENT: Negative for sore throat.    Eyes: Negative for visual disturbance.   Respiratory: Positive for shortness of breath. Negative for cough and chest tightness.    Cardiovascular: Negative for chest pain, palpitations and leg swelling.   Gastrointestinal: Negative for abdominal distention, abdominal pain, constipation, diarrhea, nausea and vomiting.   Endocrine: Negative for polyuria.   Genitourinary: Negative for decreased urine volume, dysuria, flank pain, frequency and hematuria.   Musculoskeletal: Negative for back pain and gait problem.   Skin: Negative for rash.   Neurological: Negative for syncope, speech difficulty, weakness, light-headedness and headaches.   Psychiatric/Behavioral: Negative for confusion, hallucinations and sleep disturbance.     Objective:     Vital Signs (Most Recent):  Temp: 96.5 °F (35.8 °C) (12/09/19 0705)  Pulse: 73 (12/09/19 1031)  Resp: 20 (12/09/19 1031)  BP: 101/71 (12/09/19 0705)  SpO2: 100 % (12/09/19 1031) Vital Signs (24h Range):  Temp:  [96.5 °F (35.8 °C)-98 °F (36.7 °C)] 96.5 °F (35.8 °C)  Pulse:  [] 73  Resp:  [13-24] 20  SpO2:  [88 %-100 %] 100 %  BP: ()/(28-90) 101/71     Weight: 73.3 kg (161 lb 9.6 oz)  Body mass index is 26.89 kg/m².    Intake/Output Summary (Last 24 hours) at 12/9/2019 1104  Last data filed at 12/9/2019 0709  Gross per 24 hour   Intake 869.69 ml   Output 890 ml   Net -20.31 ml      Physical  Exam   Constitutional: He is oriented to person, place, and time. He appears well-developed and well-nourished. No distress.   HENT:   Head: Normocephalic and atraumatic.   Mouth/Throat: No oropharyngeal exudate.   Eyes: Pupils are equal, round, and reactive to light. Conjunctivae and EOM are normal.   Neck: Normal range of motion. Neck supple. No JVD present. No thyromegaly present.   Cardiovascular: Normal rate and normal heart sounds.   No murmur heard.  Pulmonary/Chest: Effort normal. No respiratory distress. He has no wheezes. He has rales (at bases . clear upper lungs ). He exhibits no tenderness.   NC in place    Abdominal: Soft. Bowel sounds are normal. He exhibits no distension. There is no tenderness. There is no rebound and no guarding.   Musculoskeletal: Normal range of motion. He exhibits no edema.   Lymphadenopathy:     He has no cervical adenopathy.   Neurological: He is alert and oriented to person, place, and time. He displays normal reflexes. No cranial nerve deficit or sensory deficit.   Skin: Skin is warm and dry. No rash noted. He is not diaphoretic.   Psychiatric: He has a normal mood and affect.       Significant Labs:   CBC:   Recent Labs   Lab 12/08/19  0317 12/09/19  0322   WBC 15.08* 12.36   HGB 16.1 14.7   HCT 50.0 45.5    230     CMP:   Recent Labs   Lab 12/08/19  0317 12/08/19  0839 12/09/19  0321    139 138   K 5.6* 4.3 3.9   CL 98 96 96   CO2 27 28 28   * 123* 97   BUN 41* 42* 49*   CREATININE 1.8* 1.7* 1.5*   CALCIUM 9.8 9.7 9.1   PROT 7.2  --  6.7   ALBUMIN 3.3*  --  2.9*   BILITOT 1.7*  --  1.5*   ALKPHOS 82  --  80   *  --  78*   ALT 76*  --  52*   ANIONGAP 14 15 14   EGFRNONAA 37* 39* 46*       Significant Imaging:

## 2019-12-09 NOTE — PLAN OF CARE
POC reviewed.  AAOx4. Follows commands. BRUNO. Respirations even, nonlabored on BiPAP 40%. SR/ST 90s-100s on monitor; EKG done due to changes on monitor; Aflutter with AV block shown; Herber Damon and Dr. Andres notified.  SBP 80s-100s.  Amiodarone infusing at 33.3 ml/hr. Heparin drip will be initiated once order received. New in place;600 ml total this shift. BM x1. Denies any pain or needs at this time.  No acute distress noted.  No nausea or vomiting this shift.  Call bell and bedside table within reach. SR upx2.

## 2019-12-09 NOTE — ASSESSMENT & PLAN NOTE
Post PCI  Echo shows severely reduced EF 15%, diastolic dysfunction, and elevated PA pressure  On ASA, plavix, statin, BB and ACEi  Concern for intermittent distress, continued troponin elevation, and EKG findings; discussed with cardiology - additional IV BB given; tridil infusion trial failed with hypotension; may need more aggressive support  Continue close ICU hemodynamic monitoring  12/8 - no significant changes overnight; continue BB, ASA, statin, ACEi; continue ICU hemodynamic monitoring  12/9 - continue BB, ASA, statin, ICU hemodynamic monitoring per cards

## 2019-12-09 NOTE — ASSESSMENT & PLAN NOTE
12/9/19  - New onset Atrial flutter  Post MI/ Post PCI   -Started on Amiodarone ggt and Heparin ggt  -Continue BB  -Continue ACEI

## 2019-12-09 NOTE — PROGRESS NOTES
PTT 34.4, per heparin nomogram will bolus 30 units/hr and increase rate by 2units/kg/hr.  Repeat PTT at 2300.

## 2019-12-09 NOTE — PROGRESS NOTES
Jaret ZHANG and Murali WALDEN made aware via secure chat that pt BP marginal with MAPs 60-65 today.  UOP 30-45.  HR .  Cards stated to continue to monitor for now.

## 2019-12-09 NOTE — NURSING
0454 EKG ordered and completed by RT due to changes on monitor.  EKG Aflutter with AV Block    0457 called eICU to speak with Dr. Zamudio to report EKG results. Dr. Andres notified as well.     0517 Dr. Andres called unit. Amiodarone drip and Heparin drip will be ordered and started. VS given to Dr. Andres.  Pt updated on POC.

## 2019-12-09 NOTE — SUBJECTIVE & OBJECTIVE
Review of Systems   Constitutional: Positive for malaise/fatigue. Negative for chills and fever.   Respiratory: Positive for shortness of breath (intermittent).    Cardiovascular: Negative for orthopnea and leg swelling.        Intermittent chest tightness   Genitourinary:        New in place   Musculoskeletal: Negative.    Neurological: Negative for dizziness and focal weakness.   Psychiatric/Behavioral: The patient is nervous/anxious.          Objective:     Vital Signs (Most Recent):  Temp: 98.2 °F (36.8 °C) (12/09/19 1105)  Pulse: 99 (12/09/19 1200)  Resp: 11 (12/09/19 1200)  BP: (!) 88/62 (12/09/19 1200)  SpO2: 98 % (12/09/19 1200) Vital Signs (24h Range):  Temp:  [96.5 °F (35.8 °C)-98.2 °F (36.8 °C)] 98.2 °F (36.8 °C)  Pulse:  [] 99  Resp:  [11-22] 11  SpO2:  [88 %-100 %] 98 %  BP: ()/(28-90) 88/62     Weight: 73.3 kg (161 lb 9.6 oz)  Body mass index is 26.89 kg/m².      Intake/Output Summary (Last 24 hours) at 12/9/2019 1316  Last data filed at 12/9/2019 1200  Gross per 24 hour   Intake 957.66 ml   Output 990 ml   Net -32.34 ml       Physical Exam   Constitutional: He is oriented to person, place, and time. He appears ill. Nasal cannula in place.   HENT:   Head: Atraumatic.   Eyes: Pupils are equal, round, and reactive to light. Conjunctivae are normal.   Neck: No tracheal deviation present.   Cardiovascular: Normal rate. A regularly irregular rhythm present.   No murmur heard.  Pulses:       Radial pulses are 2+ on the right side, and 2+ on the left side.        Dorsalis pedis pulses are 2+ on the right side, and 2+ on the left side.   Pulmonary/Chest: Effort normal. He has decreased breath sounds. He has no wheezes. He has no rales.   Abdominal: Soft. Bowel sounds are normal.   Musculoskeletal: He exhibits no edema.   Neurological: He is alert and oriented to person, place, and time.   Skin: Skin is warm and dry. Capillary refill takes less than 2 seconds.            Vents:  Oxygen  Concentration (%): 40 (12/09/19 1031)    Lines/Drains/Airways     Peripherally Inserted Central Catheter Line                 PICC Double Lumen 12/09/19 0820 right brachial less than 1 day          Drain                 Urethral Catheter 12/07/19 1000 Latex 2 days          Peripheral Intravenous Line                 Peripheral IV - Single Lumen 12/05/19 2032 18 G Right Antecubital 3 days                Significant Labs:    CBC/Anemia Profile:  Recent Labs   Lab 12/08/19 0317 12/09/19  0322   WBC 15.08* 12.36   HGB 16.1 14.7   HCT 50.0 45.5    230   MCV 98 98   RDW 13.1 13.2        Chemistries:  Recent Labs   Lab 12/08/19 0317 12/08/19  0839 12/09/19 0321 12/09/19  1124    139 138 135*   K 5.6* 4.3 3.9 3.8   CL 98 96 96 95   CO2 27 28 28 29   BUN 41* 42* 49* 50*   CREATININE 1.8* 1.7* 1.5* 1.4   CALCIUM 9.8 9.7 9.1 8.7   ALBUMIN 3.3*  --  2.9*  --    PROT 7.2  --  6.7  --    BILITOT 1.7*  --  1.5*  --    ALKPHOS 82  --  80  --    ALT 76*  --  52*  --    *  --  78*  --    MG  --   --  2.6  --        All pertinent labs within the past 24 hours have been reviewed.    Significant Imaging:  I have reviewed all pertinent imaging results/findings within the past 24 hours.

## 2019-12-09 NOTE — PLAN OF CARE
Pt aaox3.  Pt is SR/ST  on the heart monitor.  Ppt did not have any episodes of severe SOB, mild SOB at rest.  BP remaining low this afternoon, Murali WALDEN made aware of MAP 60-65. No new orders.  Metoprolol and ramipril held d/t BP.  Resp: sats stable on 5L Nc and on bipap.  : mcfarland in place and output 30-50ml/hr.  Pt turned and repositioned with use of pillows independently.  PIV and PICC intact with no redness, swelling or drainage.  Bed low, wheels locked, alarms audible, call light in reach.  Plan of care reviewed with pt and family.  Pt and family verbalizes understanding. Will continue to monitor.   Temp:  [96.5 °F (35.8 °C)-98.3 °F (36.8 °C)]   Pulse:  []   Resp:  [11-22]   BP: ()/(53-71)   SpO2:  [95 %-100 %]    I/O this shift:  In: 438.2 [I.V.:338.2; IV Piggyback:100]  Out: 490 [Urine:490]

## 2019-12-09 NOTE — ASSESSMENT & PLAN NOTE
- New onset Post -MI   -Continue Diuresis   -Continue BB  -ACEI is discontinued by Cardiology due to hypotension or marginal BP   -Close monitoring in ICU   12/8/19-  Good diuresis is noted with IV Furosemide   Started back on ACEI   Continue BB  12/9/19-  Continue diuresis , BB, ACEI

## 2019-12-09 NOTE — PROGRESS NOTES
Waiting for PTT to result, sample sent to lab 1.5 hours ago.  Lab called and stated that it will be another 30-45min since it was ordered routine. Will continue to monitor and wait for results.

## 2019-12-09 NOTE — PLAN OF CARE
Met with patient to initiate discharge plans and education. Patient will need continued education. CM to follow.         12/09/19 6060   Discharge Reassessment   Assessment Type Discharge Planning Reassessment   Provided patient/caregiver education on the expected discharge date and the discharge plan Yes  (needs reinforcement)   Do you have any problems affording any of your prescribed medications? TBD   Discharge Plan A Home with family;Home Health   Discharge Plan B Home with family   DME Needed Upon Discharge  other (see comments)  (tbd)   Patient choice form signed by patient/caregiver N/A   Anticipated Discharge Disposition Home-Health   Can the patient answer the patient profile reliably? No, cognitively impaired   Describe the patient's ability to walk at the present time. Major restrictions/daily assistance from another person   How often would a person be available to care for the patient? Often   Number of comorbid conditions (as recorded on the chart) Five or more

## 2019-12-09 NOTE — ASSESSMENT & PLAN NOTE
Continue BB  Keep K+ >4 and Mag >2  Continue telemetry monitoring  Keep in ICU for now    12/7  -continue BB    12/8  -keep K+>4 and Mag>2  -Continue BB as tolerated    12/9/19  -Keep electrolytes WNL  -Continue BB as BP permits

## 2019-12-09 NOTE — ASSESSMENT & PLAN NOTE
-HR controlled at time of exam  -Continue amio gtt  -Continue BB as BP permits  -Continue heparin gtt

## 2019-12-09 NOTE — SUBJECTIVE & OBJECTIVE
Review of Systems   Constitution: Positive for malaise/fatigue.   HENT: Negative.    Eyes: Negative.    Cardiovascular: Positive for dyspnea on exertion and orthopnea.   Respiratory: Positive for shortness of breath.    Endocrine: Negative.    Hematologic/Lymphatic: Negative.    Skin: Negative.    Musculoskeletal: Negative.    Gastrointestinal: Negative.    Genitourinary: Negative.    Neurological: Positive for weakness.   Psychiatric/Behavioral: Negative.    Allergic/Immunologic: Negative.      Objective:     Vital Signs (Most Recent):  Temp: 96.5 °F (35.8 °C) (12/09/19 0705)  Pulse: 82 (12/09/19 1105)  Resp: 18 (12/09/19 1105)  BP: (!) 87/65 (12/09/19 1105)  SpO2: 100 % (12/09/19 1105) Vital Signs (24h Range):  Temp:  [96.5 °F (35.8 °C)-98 °F (36.7 °C)] 96.5 °F (35.8 °C)  Pulse:  [] 82  Resp:  [13-24] 18  SpO2:  [88 %-100 %] 100 %  BP: ()/(28-90) 87/65     Weight: 73.3 kg (161 lb 9.6 oz)  Body mass index is 26.89 kg/m².     SpO2: 100 %  O2 Device (Oxygen Therapy): nasal cannula      Intake/Output Summary (Last 24 hours) at 12/9/2019 1115  Last data filed at 12/9/2019 0709  Gross per 24 hour   Intake 869.69 ml   Output 815 ml   Net 54.69 ml       Lines/Drains/Airways     Peripherally Inserted Central Catheter Line                 PICC Double Lumen 12/09/19 0820 right brachial less than 1 day          Drain                 Urethral Catheter 12/07/19 1000 Latex 2 days          Peripheral Intravenous Line                 Peripheral IV - Single Lumen 12/05/19 2032 18 G Right Antecubital 3 days                Physical Exam   Constitutional: He is oriented to person, place, and time. He appears well-developed and well-nourished. He appears distressed.   Ill appearing   HENT:   Head: Normocephalic and atraumatic.   Eyes: Pupils are equal, round, and reactive to light. Right eye exhibits no discharge. Left eye exhibits no discharge.   Neck: Neck supple. No JVD present.   Cardiovascular: Normal rate, S1  normal, S2 normal and normal heart sounds. An irregularly irregular rhythm present.   No murmur heard.  Pulmonary/Chest: Effort normal. No respiratory distress. He has no wheezes. He has rales.   Abdominal: Soft. He exhibits no distension.   Musculoskeletal: He exhibits no edema.   Neurological: He is alert and oriented to person, place, and time.   Skin: Skin is warm and dry. He is not diaphoretic. No erythema.   Right groin access site C/D/I; no bleeding erythema or drainage   Psychiatric: He has a normal mood and affect. His behavior is normal. Thought content normal.   Nursing note and vitals reviewed.      Significant Labs:   CMP   Recent Labs   Lab 12/08/19 0317 12/08/19  0839 12/09/19  0321    139 138   K 5.6* 4.3 3.9   CL 98 96 96   CO2 27 28 28   * 123* 97   BUN 41* 42* 49*   CREATININE 1.8* 1.7* 1.5*   CALCIUM 9.8 9.7 9.1   PROT 7.2  --  6.7   ALBUMIN 3.3*  --  2.9*   BILITOT 1.7*  --  1.5*   ALKPHOS 82  --  80   *  --  78*   ALT 76*  --  52*   ANIONGAP 14 15 14   ESTGFRAFRICA 42* 45* 53*   EGFRNONAA 37* 39* 46*   , CBC   Recent Labs   Lab 12/08/19 0317 12/09/19  0322   WBC 15.08* 12.36   HGB 16.1 14.7   HCT 50.0 45.5    230   , Troponin   Recent Labs   Lab 12/07/19  1148 12/09/19  0534   TROPONINI >50.000* 38.671*    and All pertinent lab results from the last 24 hours have been reviewed.    Significant Imaging: Echocardiogram:   2D echo with color flow doppler:   Results for orders placed or performed during the hospital encounter of 12/05/19   2D echo with color flow doppler   Result Value Ref Range    QEF 25 (A) 55 - 65    Pericardial Effusion SMALL (A)     Narrative    Date of Procedure: 12/07/2019        TEST DESCRIPTION   Technical Quality: This is a technically adequate study.     Aorta: The aortic root is normal in size.     Left Atrium: The left atrium is normal in size.     Left Ventricle: The left ventricle is normal in size. LV wall thickness is normal. The  following segments were akinetic: apical septum, apical lateral wall, apical inferior wall, apical anterior wall.  The following segments were mildly hypokinetic: basal anterior wall.  The following segments were severely hypokinetic: mid inferoseptum, mid anterior wall.  Left ventricular systolic function appears severely depressed. Visually estimated ejection fraction is 25-30%.     Diastolic indices:     Right Atrium: The right atrium is normal in size.     Right Ventricle: The right ventricle is normal in size. Global right ventricular systolic function appears normal.     Aortic Valve:  The aortic valve is normal in structure.     Mitral Valve:  The mitral valve is normal in structure.     Pulmonary Valve:  The pulmonic valve is not well seen.     Pericardium: There is evidence of a small postero-lateral pericardial effusion.     IVC: IVC is enlarged but collapses > 50% with a sniff, suggesting intermediate right atrial pressure of 8 mmHg.     Intracavitary: There is no evidence of intracavity mass, thrombi, or vegetation.         CONCLUSIONS     1 - Wall motion abnormalities.     2 - Severely depressed left ventricular systolic function (EF 25-30%).     3 - Normal right ventricular systolic function .     4 - Focal small pericardial effusion on right side. No tamponade    5 - Intermediate central venous pressure.     6 - Limited echo study to rule out mechanical rupture.            This document has been electronically    SIGNED BY: Lamont Andres MD On: 12/08/2019 10:28   , EKG: Reviewed and X-Ray: CXR: X-Ray Chest 1 View (CXR):   Results for orders placed or performed during the hospital encounter of 12/05/19   X-Ray Chest 1 View    Narrative    EXAMINATION:  XR CHEST 1 VIEW    CLINICAL HISTORY:  PICC placement;    TECHNIQUE:  Single frontal view of the chest was performed.    COMPARISON:  Chest radiograph 12/08/2019 with priors    FINDINGS:  Cardiac leads project over the chest.  Interval placement of a  right-sided peripherally inserted central venous catheter with tip projecting in the SVC.  Cardiomediastinal silhouette is within normal limits and stable.  Persistent interstitial infiltrates.  No large consolidative opacity, effusion or pneumothorax.  Osseous structures appear intact.      Impression    Interval placement of a right-sided PICC with tip projecting in the SVC.    Stable bilateral interstitial infiltrates.      Electronically signed by: Henry Martin  Date:    12/09/2019  Time:    09:17    and X-Ray Chest PA and Lateral (CXR): No results found for this visit on 12/05/19.

## 2019-12-09 NOTE — ASSESSMENT & PLAN NOTE
Decreased ectopy and short runs of v tach through night; none seen to now this morning  Continue BB  12/8 - ICU hemodynamic monitoring  12/9 - no ventricular ectopy seen today

## 2019-12-09 NOTE — PROGRESS NOTES
Dmitry WALDEN made aware of MAP >65 for the first time in a couple of hours.  UOP last three hours 20-25.  HR .  Pt resting on the bipap.  MD stated to hold the metoprolol and give lasix at this time.  Will give and monitor.

## 2019-12-10 NOTE — PHYSICIAN QUERY
PT Name: Raleigh Walsh  MR #: [id number]     Physician Query Form - Diagnosis Clarification      CDS/: Amie Philippe               Contact information: [email or web address]      This form is a permanent document in the medical record.     Query Date: December 10, 2019    By submitting this query, we are merely seeking further clarification of documentation.  Please utilize your independent clinical judgment when addressing the question(s) below.     The medical record contains the following:      Findings Supporting Clinical Information Location in Medical Record   Cardiogenic Shock  PENNY (acute kidney injury)  Likely s/t cardiogenic shock; monitor trend  Creatinine holding  If BP worsens will start low dose dobutamine    12/6/19--Patient seen and examined in room, lying in bed. Continues to complain of intermittent chest discomfort today but much improved since PCI. He was noted to have 8-10 beats of VT on monitor at time of exam this AM. Keep in ICU for now. Labs reviewed, K+ 4.8, Cr 1.4, Troponin >50.      12/7/19--Patient seen and examined in ICU, lying in bed. Had multiple episodes of shortness of breath, palpitations with diaphoresis noted o/n. HR at time of exam 110s. Will optimize medical regimen for CAD/CHF today. Discussed with patient and family at bedside given guarded prognosis given ICM post STEMI. Will add Tridil gtt today and reassess response.      12/8/19--Patient seen and examined in ICU today. Reports continued shortness of breath today but slightly/marginally improved from yesterday. HR remains elevated today in 110-120s, EKG revealed ST with PACs today. Patient unable to tolerate Tridil gtt yesterday due to hypotension. Will try to optimize medical regimen today but low BP and elevated HR makes medical therapy challenging. Labs reviewed, K+ 4.3, Cr 1.7, BUN 42. Repeat CXR ordered.  Pulmonology notes 12/10      Cards note 12/10              Cards note 12/10              Cards note  12/10       Please clarify if the __Cardiogenic Shock__ diagnosis has been:    [x  ] Ruled In   [  ] Ruled In, Now Resolved   [  ] Ruled Out   [  ] Other/Clarification of findings (please specify):   [  ] Clinically insignificant     [  ] Clinically undetermined     Please document in your progress notes daily for the duration of treatment, until resolved, and include in your discharge summary.

## 2019-12-10 NOTE — ASSESSMENT & PLAN NOTE
-HR controlled at time of exam  -Continue amio gtt  -Continue BB as BP permits  -Continue heparin gtt    12/10/19  -Converted to SR this AM  -Switch amiodarone gtt to po  -No BB given borderline BP  -Continue heparin gtt for now; may not need long term AC given brief duration of arrhythmia; will re-evaluate

## 2019-12-10 NOTE — ASSESSMENT & PLAN NOTE
-EF 15-20%, +WMA, PHTN  -Continue IV lasix BID, IV lopressor Q6H  -Use Ramipril if BP can tolerate  -Dash diet, 2 gm sodium restriction  -1200 ml fluid restriction  -Daily weights  -Strict intake and output  -Avoid exertional activities  -CXR stable  -Reassess in AM    12/9/19  -Slowly improving  -Continue IV diuresis  -Continue BB and ACEi as BP permits  -Optimization of medical therapy challenging given hypotension     12/10/19  -Appears slightly improved today  -BP marginal  -Hold BB, ACEi, and Lasix for now  -Will further optimize medical therapy as clinical condition permits  -Will consider low dose dobutamine, if needed  -LifeVest upon d/c home for SCD prevention

## 2019-12-10 NOTE — PLAN OF CARE
PT REMAINS A-FEBRILE AND IN PAROXYMAL A-FLUTTER WITH A CONTROLED RATE.  DENIES PAIN.  TOLERATED BIPAP THROUGHOUT THE NIGHT WELL.  FACE MASK CHANGED R/T DISCONNECT ALARM, HOWEVER BIPAP CONTINUES TO ALARM.  O2 SATURATION AND TIDAL VOLUMES NOT AFFECTED.  NO AIR LEAK DETECTED.  URINE OUTPUT AND BP MARGINAL.  BLOOD GLUCOSE WNL.  HEPARIN THERAPEUTIC @ 14 UNITS/KG/HOUR AS OF 2300 LAB DRAW.  TURNS SELF.  POC REVIEW WITH PT AT START OF SIFT.

## 2019-12-10 NOTE — ASSESSMENT & PLAN NOTE
-Baseline creatinine 1.1 to 1.2  -Monitor Renal indices while on diuretic therapy   -Stable at 1.5 to 1.7   -Stable

## 2019-12-10 NOTE — PROGRESS NOTES
[facility] -   Cardiology  Progress Note    Patient Name: Raleigh Walsh  MRN: [id number]  Admission Date: 12/5/2019  Hospital Length of Stay: 5 days  Code Status: No Order   Attending Physician: Gilmar Florence MD   Primary Care Physician: Akhil Smith MD  Expected Discharge Date:   Principal Problem:STEMI (ST elevation myocardial infarction)    Subjective:   HPI:  75 y/o male woth PMHx for CADHx of PCI, HTN, HLP presented to [facility] ER with anterior MI and tx with TNK. [facility] Pt initially reperfused clinically but later on with CP.  Pt taken to the cath lab for postinfarct angina.    Hospital Course:   12/6/19--Patient seen and examined in room, lying in bed. Continues to complain of intermittent chest discomfort today but much improved since PCI. He was noted to have 8-10 beats of VT on monitor at time of exam this AM. Keep in ICU for now. Labs reviewed, K+ 4.8, Cr 1.4, Troponin >50.     12/7/19--Patient seen and examined in ICU, lying in bed. Had multiple episodes of shortness of breath, palpitations with diaphoresis noted o/n. HR at time of exam 110s. Will optimize medical regimen for CAD/CHF today. Discussed with patient and family at bedside given guarded prognosis given ICM post STEMI. Will add Tridil gtt today and reassess response.     12/8/19--Patient seen and examined in ICU today. Reports continued shortness of breath today but slightly/marginally improved from yesterday. HR remains elevated today in 110-120s, EKG revealed ST with PACs today. Patient unable to tolerate Tridil gtt yesterday due to hypotension. Will try to optimize medical regimen today but low BP and elevated HR makes medical therapy challenging. Labs reviewed, K+ 4.3, Cr 1.7, BUN 42. Repeat CXR ordered.     12/9/19-Patient seen and examined today. Still SOB and fatigued, some improvement overnight. Denies chest pain. Converted to aflutter this AM, amiodarone drip initiated without bolus. Labs  reviewed. Creatinine 1.5, liver enzymes improving. Troponin trending down. BP remains marginal.     12/10/19-Patient seen and examined today. Appears slightly stronger, more talkative. Still endorses SOB. No sharan chest pain or tightness. Converted to SR overnight. Labs reviewed. Creatinine stable.         Review of Systems   Constitution: Positive for malaise/fatigue.   HENT: Negative.    Eyes: Negative.    Cardiovascular: Positive for dyspnea on exertion.   Respiratory: Positive for shortness of breath.    Endocrine: Negative.    Hematologic/Lymphatic: Negative.    Skin: Negative.    Musculoskeletal: Negative.    Gastrointestinal: Negative.    Genitourinary: Negative.    Neurological: Positive for weakness.   Psychiatric/Behavioral: Negative.    Allergic/Immunologic: Negative.      Objective:     Vital Signs (Most Recent):  Temp: 97.6 °F (36.4 °C) (12/10/19 0705)  Pulse: 88 (12/10/19 1005)  Resp: 17 (12/10/19 1005)  BP: (!) 89/67 (12/10/19 1005)  SpO2: 100 % (12/10/19 1005) Vital Signs (24h Range):  Temp:  [97 °F (36.1 °C)-98.3 °F (36.8 °C)] 97.6 °F (36.4 °C)  Pulse:  [73-99] 88  Resp:  [11-22] 17  SpO2:  [93 %-100 %] 100 %  BP: ()/(51-73) 89/67     Weight: 73.3 kg (161 lb 9.6 oz)  Body mass index is 26.89 kg/m².     SpO2: 100 %  O2 Device (Oxygen Therapy): nasal cannula      Intake/Output Summary (Last 24 hours) at 12/10/2019 1026  Last data filed at 12/10/2019 1000  Gross per 24 hour   Intake 1325.69 ml   Output 1110 ml   Net 215.69 ml       Lines/Drains/Airways     Peripherally Inserted Central Catheter Line                 PICC Double Lumen 12/09/19 0820 right brachial 1 day          Drain                 Urethral Catheter 12/07/19 1000 Latex 3 days          Peripheral Intravenous Line                 Peripheral IV - Single Lumen 12/05/19 2032 18 G Right Antecubital 4 days                Physical Exam   Constitutional: He is oriented to person, place, and time. He appears well-developed and  well-nourished. No distress.   On NC  Ill appearing   HENT:   Head: Normocephalic and atraumatic.   Eyes: Pupils are equal, round, and reactive to light. Right eye exhibits no discharge. Left eye exhibits no discharge.   Neck: Neck supple. No JVD present.   Cardiovascular: Normal rate, regular rhythm, S1 normal, S2 normal and normal heart sounds.   No murmur heard.  Pulmonary/Chest: Effort normal and breath sounds normal. No respiratory distress. He has no wheezes. He has no rales.   Abdominal: Soft. He exhibits no distension.   Musculoskeletal: He exhibits no edema.   Neurological: He is alert and oriented to person, place, and time.   Skin: Skin is warm and dry. He is not diaphoretic. No erythema.   Right groin access site C/D/I; no bleeding, erythema, or drainage    Cool extremities   Psychiatric: He has a normal mood and affect. His behavior is normal. Thought content normal.   Nursing note and vitals reviewed.      Significant Labs:   CMP   Recent Labs   Lab 12/09/19  0321 12/09/19  1124 12/10/19  0441    135* 135*   K 3.9 3.8 3.9   CL 96 95 94*   CO2 28 29 30*   GLU 97 139* 135*   BUN 49* 50* 53*   CREATININE 1.5* 1.4 1.5*   CALCIUM 9.1 8.7 9.0   PROT 6.7  --  6.5   ALBUMIN 2.9*  --  2.7*   BILITOT 1.5*  --  0.9   ALKPHOS 80  --  71   AST 78*  --  42*   ALT 52*  --  42   ANIONGAP 14 11 11   ESTGFRAFRICA 53* 57* 53*   EGFRNONAA 46* 49* 46*   , CBC   Recent Labs   Lab 12/09/19  0322 12/10/19  0441   WBC 12.36 10.30   HGB 14.7 13.8*   HCT 45.5 41.8    252   , Troponin   Recent Labs   Lab 12/09/19  0534   TROPONINI 38.671*    and All pertinent lab results from the last 24 hours have been reviewed.    Significant Imaging: Echocardiogram:   2D echo with color flow doppler:   Results for orders placed or performed during the hospital encounter of 12/05/19   2D echo with color flow doppler   Result Value Ref Range    QEF 25 (A) 55 - 65    Pericardial Effusion SMALL (A)     Narrative    Date of Procedure:  12/07/2019        TEST DESCRIPTION   Technical Quality: This is a technically adequate study.     Aorta: The aortic root is normal in size.     Left Atrium: The left atrium is normal in size.     Left Ventricle: The left ventricle is normal in size. LV wall thickness is normal. The following segments were akinetic: apical septum, apical lateral wall, apical inferior wall, apical anterior wall.  The following segments were mildly hypokinetic: basal anterior wall.  The following segments were severely hypokinetic: mid inferoseptum, mid anterior wall.  Left ventricular systolic function appears severely depressed. Visually estimated ejection fraction is 25-30%.     Diastolic indices:     Right Atrium: The right atrium is normal in size.     Right Ventricle: The right ventricle is normal in size. Global right ventricular systolic function appears normal.     Aortic Valve:  The aortic valve is normal in structure.     Mitral Valve:  The mitral valve is normal in structure.     Pulmonary Valve:  The pulmonic valve is not well seen.     Pericardium: There is evidence of a small postero-lateral pericardial effusion.     IVC: IVC is enlarged but collapses > 50% with a sniff, suggesting intermediate right atrial pressure of 8 mmHg.     Intracavitary: There is no evidence of intracavity mass, thrombi, or vegetation.         CONCLUSIONS     1 - Wall motion abnormalities.     2 - Severely depressed left ventricular systolic function (EF 25-30%).     3 - Normal right ventricular systolic function .     4 - Focal small pericardial effusion on right side. No tamponade    5 - Intermediate central venous pressure.     6 - Limited echo study to rule out mechanical rupture.            This document has been electronically    SIGNED BY: Lamont Andres MD On: 12/08/2019 10:28   , EKG: Reviewed and X-Ray: CXR: X-Ray Chest 1 View (CXR):   Results for orders placed or performed during the hospital encounter of 12/05/19   X-Ray Chest 1 View     Narrative    EXAMINATION:  XR CHEST 1 VIEW    CLINICAL HISTORY:  PICC placement;    TECHNIQUE:  Single frontal view of the chest was performed.    COMPARISON:  Chest radiograph 12/08/2019 with priors    FINDINGS:  Cardiac leads project over the chest.  Interval placement of a right-sided peripherally inserted central venous catheter with tip projecting in the SVC.  Cardiomediastinal silhouette is within normal limits and stable.  Persistent interstitial infiltrates.  No large consolidative opacity, effusion or pneumothorax.  Osseous structures appear intact.      Impression    Interval placement of a right-sided PICC with tip projecting in the SVC.    Stable bilateral interstitial infiltrates.      Electronically signed by: Henry Martin  Date:    12/09/2019  Time:    09:17    and X-Ray Chest PA and Lateral (CXR): No results found for this visit on 12/05/19.    Assessment and Plan:   Patient who presents with acute STEMI/ICM. Slowly progressing. Converted back to SR, start po amiodarone. Continue other meds as BP permits. Will consider dobutamine, if needed.    * STEMI (ST elevation myocardial infarction)  75 y/o male woth PMHx for CADHx of PCI, HTN, HLP presented to [facility] ER with anterior MI and tx with TNK. Pt transferred to [facility]. Pt initially reperfused clinically but later on with CP.  Pt taken to the cath lab for postinfarct angina.    12/6/19  -s/p PCI of LAD per Dr. Alcazar  -ECHO pending  -Continue ASA, Statin, BB, ACEi, Plavix  -Check FLP  -Keep in ICU for today  -Dash diet, 2 gm sodium restriction  -1.5L Fluid restriction  -Repeat labs in AM    12/7/19  -Echo revealed EF 15-20%, +WMA's, PHTN, DD  -Add Tridil gtt today  -Stop ACEi today given need for Tridil gtt for symptom management   -Continue ASA, Statin, Plavix, BB, IV lasix BID, Tridil gtt  -Further recs to follow pending clinical course    12/8/19  -Continue ASA, Statin, Plavix, IV lasix BID, IV lopressor q6H  -Add ramipril if BP can  tolerate  -keep in ICU for close monitoring given clinical condition with little/no improvement    12/9/19  -No chest pain overnight, SOB slowly improving  -Continue ASA, statin, Plavix, IV Lasix, IV lopressor  -Hold ACEi if needed given borderline BP  -Keep in ICU    12/10/19  -Slowly progressing post MI  -Continue ASA, statin, Plavix  -Continue other meds as BP permits  -Will further optimize as clinical condition permits  -Will consider low dose dobutamine, if needed but would like to try to avoid if possible given risk of recurrent aflutter/arrhythmias     Atrial flutter, New- onset   -HR controlled at time of exam  -Continue amio gtt  -Continue BB as BP permits  -Continue heparin gtt    12/10/19  -Converted to SR this AM  -Switch amiodarone gtt to po  -No BB given borderline BP  -Continue heparin gtt for now; may not need long term AC given brief duration of arrhythmia; will re-evaluate    Leukocytosis, likely reactive   -Mgmt per primary team    PENNY (acute kidney injury)  -Creatinine stable, monitor  -Repeat BMP in AM    Acute systolic congestive heart failure, NYHA class 3  -EF 15-20%, +WMA, PHTN  -Continue IV lasix BID, IV lopressor Q6H  -Use Ramipril if BP can tolerate  -Dash diet, 2 gm sodium restriction  -1200 ml fluid restriction  -Daily weights  -Strict intake and output  -Avoid exertional activities  -CXR stable  -Reassess in AM    12/9/19  -Slowly improving  -Continue IV diuresis  -Continue BB and ACEi as BP permits  -Optimization of medical therapy challenging given hypotension     12/10/19  -Appears slightly improved today  -BP marginal  -Hold BB, ACEi, and Lasix for now  -Will further optimize medical therapy as clinical condition permits  -Will consider low dose dobutamine, if needed  -LifeVest upon d/c home for SCD prevention    YESENIA (obstructive sleep apnea)  -continue nightly cpap    Paroxysmal VT  Continue BB  Keep K+ >4 and Mag >2  Continue telemetry monitoring  Keep in ICU for  now    12/7  -continue BB    12/8  -keep K+>4 and Mag>2  -Continue BB as tolerated    12/9/19  -Keep electrolytes WNL  -Continue BB as BP permits    12/10/19  -See plan above  -LifeVest for SCD prevention    Mixed hyperlipidemia  -Continue statin  -Check FLP        VTE Risk Mitigation (From admission, onward)         Ordered     heparin 25,000 units in dextrose 5% 250 mL (100 units/mL) infusion LOW INTENSITY nomogram - [facility]  Continuous     Question:  Heparin Infusion Adjustment (DO NOT MODIFY ANSWER)  Answer:  \\[email or web address]\epic\Images\Pharmacy\HeparinInfusions\heparin LOW INTENSITY nomogram for [facility] [id number]    12/09/19 0628     heparin 25,000 units in dextrose 5% (100 units/ml) IV bolus from bag - ADDITIONAL PRN BOLUS - 30 units/kg  As needed (PRN)     Question:  Heparin Infusion Adjustment (DO NOT MODIFY ANSWER)  Answer:  \\[email or web address]\epic\Images\Pharmacy\HeparinInfusions\heparin LOW INTENSITY nomogram for [facility] [id number]    12/09/19 0628     heparin 25,000 units in dextrose 5% (100 units/ml) IV bolus from bag - ADDITIONAL PRN BOLUS - 60 units/kg  As needed (PRN)     Question:  Heparin Infusion Adjustment (DO NOT MODIFY ANSWER)  Answer:  \\[email or web address]\epic\Images\Pharmacy\HeparinInfusions\heparin LOW INTENSITY nomogram for [facility] [id number]    12/09/19 0628                Veronica Raygoza PA-C  Cardiology  [facility]

## 2019-12-10 NOTE — ASSESSMENT & PLAN NOTE
-Likely hepatic congestion due to acute CHF.   -Continue Diuresis   -Improving with diuresis   -Resolving

## 2019-12-10 NOTE — PROGRESS NOTES
[facility] -   Critical Care Medicine  Progress Note    Patient Name: Raleigh Walsh  MRN: [id number]  Admission Date: 12/5/2019  Hospital Length of Stay: 5 days  Code Status: No Order  Attending Provider: Gilmar Florence MD  Primary Care Provider: Akhil Smith MD   Principal Problem: STEMI (ST elevation myocardial infarction)    Subjective:     HPI:  74 year old male with PMH including CAD s/p MI with stent to LAD; HTN; HLD; depression; insomnia; reports YESENIA diagnosis and has CPAP but has not routinely used  Presented to [facility] ED with CP and EKG showed STEMI  tnkase given at 2046 and he was urgently transferred here for evaluation by cardiology  Taken for [facility] after arrival at this facility  PCI of LAD stent with balloon + new LAD stent perfromed    Hospital/ICU Course:  Admitted to ICU overnight post C  Intermittent complaints of chest discomfort along with occasional reperfusion ectopy on cardiac monitor  Intermittent nausea/vomiting throughout today  12/7 - tachycardia, anxiety reported overnight; this am tachycardia, chest pressure, SOB with transition off nocturnal CPAP to NC; troponin remains > 50k  12/8 - less pronounced dyspnea and tachycardia today; Dr Andres reports no decline on repeat echo last evening; afebrile; diuresed 1.5L  12/9 - overnight ST converted to atrial flutter with RVR, amiodarone and heparin infusions initiated; BP remains marginal; intermittent dyspnea with some improvement  12/10 - SR on monitor with amiodarone and heparin infusions; continued intermittent SOB, chest tightness; 12 Lead EKG reveals no changes; BP remains soft, lopressor held overnight    Review of Systems   Constitutional: Positive for malaise/fatigue. Negative for chills and fever.   HENT: Negative.    Respiratory: Positive for shortness of breath (intermittent).    Cardiovascular:        Intermittent chest tightness   Gastrointestinal: Negative for nausea and vomiting.   Musculoskeletal:  Positive for myalgias.   Skin: Negative.    Psychiatric/Behavioral: The patient is not nervous/anxious.          Objective:     Vital Signs (Most Recent):  Temp: 97.6 °F (36.4 °C) (12/10/19 0705)  Pulse: 88 (12/10/19 1005)  Resp: 17 (12/10/19 1005)  BP: (!) 89/67 (12/10/19 1005)  SpO2: 100 % (12/10/19 1005) Vital Signs (24h Range):  Temp:  [97 °F (36.1 °C)-98.3 °F (36.8 °C)] 97.6 °F (36.4 °C)  Pulse:  [73-99] 88  Resp:  [11-22] 17  SpO2:  [93 %-100 %] 100 %  BP: ()/(51-73) 89/67     Weight: 73.3 kg (161 lb 9.6 oz)  Body mass index is 26.89 kg/m².      Intake/Output Summary (Last 24 hours) at 12/10/2019 1030  Last data filed at 12/10/2019 1000  Gross per 24 hour   Intake 1325.69 ml   Output 1110 ml   Net 215.69 ml       Physical Exam   Constitutional: He is oriented to person, place, and time. He appears ill. Nasal cannula in place.   HENT:   Head: Atraumatic.   Eyes: Pupils are equal, round, and reactive to light. Conjunctivae are normal.   Neck: No tracheal deviation present.   Cardiovascular: Normal rate and regular rhythm.   No murmur heard.  Pulses:       Radial pulses are 1+ on the right side, and 1+ on the left side.        Dorsalis pedis pulses are 1+ on the right side, and 1+ on the left side.   Pulmonary/Chest: Effort normal. He has decreased breath sounds. He has no wheezes. He has no rales.   Abdominal: Soft. Bowel sounds are normal.   Musculoskeletal: He exhibits no edema.   Neurological: He is alert and oriented to person, place, and time.   Skin: Skin is dry. Capillary refill takes less than 2 seconds. No cyanosis.            Vents:  Oxygen Concentration (%): 36 (12/10/19 1005)    Lines/Drains/Airways     Peripherally Inserted Central Catheter Line                 PICC Double Lumen 12/09/19 0820 right brachial 1 day          Drain                 Urethral Catheter 12/07/19 1000 Latex 3 days          Peripheral Intravenous Line                 Peripheral IV - Single Lumen 12/05/19 2032 18 G Right  Antecubital 4 days                Significant Labs:    CBC/Anemia Profile:  Recent Labs   Lab 12/09/19  0322 12/10/19  0441   WBC 12.36 10.30   HGB 14.7 13.8*   HCT 45.5 41.8    252   MCV 98 96   RDW 13.2 12.9        Chemistries:  Recent Labs   Lab 12/09/19  0321 12/09/19  1124 12/10/19  0441    135* 135*   K 3.9 3.8 3.9   CL 96 95 94*   CO2 28 29 30*   BUN 49* 50* 53*   CREATININE 1.5* 1.4 1.5*   CALCIUM 9.1 8.7 9.0   ALBUMIN 2.9*  --  2.7*   PROT 6.7  --  6.5   BILITOT 1.5*  --  0.9   ALKPHOS 80  --  71   ALT 52*  --  42   AST 78*  --  42*   MG 2.6  --  2.8*   PHOS  --   --  3.4       All pertinent labs within the past 24 hours have been reviewed.    Significant Imaging:  I have reviewed all pertinent imaging results/findings within the past 24 hours.      ABG  No results for input(s): PH, PO2, PCO2, HCO3, BE in the last 168 hours.  Assessment/Plan:     Cardiac/Vascular  * STEMI (ST elevation myocardial infarction)  Post PCI  Echo shows severely reduced EF 15%, diastolic dysfunction, and elevated PA pressure  On ASA, plavix, statin, BB and ACEi  Concern for intermittent distress, continued troponin elevation, and EKG findings; discussed with cardiology - additional IV BB given; tridil infusion trial failed with hypotension; may need more aggressive support  Continue close ICU hemodynamic monitoring  12/8 - no significant changes overnight; continue BB, ASA, statin, ACEi; continue ICU hemodynamic monitoring  12/9 - continue BB, ASA, statin, ICU hemodynamic monitoring per cards  12/10 - overall status remains marginal; discussed with cards, will mobilize today and if BP decline may start low dose dobutamine    Atrial flutter, New- onset   Amiodarone infusion to enteral today  Continue anticoagulation with heparin infusion; brief a flutter, may not need long term anticoagulation, re-eval in am    Paroxysmal VT  Decreased ectopy and short runs of v tach through night; none seen to now this  morning  Continue BB  12/8 - ICU hemodynamic monitoring  12/9 - no ventricular ectopy seen today    Renal/  PENNY (acute kidney injury)  Likely s/t cardiogenic shock; monitor trend  Creatinine holding  If BP worsens will start low dose dobutamine    Oncology  Leukocytosis, likely reactive   Suspect reactive  Encourage IS and mobilize  resolved    Other  YESENIA (obstructive sleep apnea)  Nocturnal CPAP ordered       Critical Care Daily Checklist:    A: Awake: RASS Goal/Actual Goal: RASS Goal: 0-->alert and calm  Actual: Musa Agitation Sedation Scale (RASS): Alert and calm   B: Spontaneous Breathing Trial Performed?     C: SAT & SBT Coordinated?  n/a                      D: Delirium: CAM-ICU Overall CAM-ICU: Negative   E: Early Mobility Performed? Yes   F: Feeding Goal:    Status:     Current Diet Order   Procedures    Diet Cardiac     Fluid restriction 1500 ml/day      AS: Analgesia/Sedation prn   T: Thromboembolic Prophylaxis heparin   H: HOB > 300 Yes   U: Stress Ulcer Prophylaxis (if needed) pepcid   G: Glucose Control monitoring   B: Bowel Function Stool Occurrence: 1   I: Indwelling Catheter (Lines & New) Necessity reviewed   D: De-escalation of Antimicrobials/Pharmacotherapies reviewed    Plan for the day/ETD Mobilize; may need dobutamine infusion      Family/Goals of Care:   Home   I have discussed case and plan of care in detail with Dr Faust and Dr Carrion; Status and plan of care were discussed with team on multidisciplinary rounds.    Critical Care Time: 50 minutes  Critical secondary to STEMI, hypotensionCritical care was time spent personally by me on the following activities: development of treatment plan with patient or surrogate and bedside caregivers, discussions with consultants, evaluation of patient's response to treatment, examination of patient, ordering and performing treatments and interventions, ordering and review of laboratory studies, ordering and review of radiographic studies, pulse  oximetry, re-evaluation of patient's condition. This critical care time did not overlap with that of any other provider or involve time for any procedures.     MARIELENA Pa-BC  Critical Care Medicine  [facility]

## 2019-12-10 NOTE — SUBJECTIVE & OBJECTIVE
Interval History:   Pt feels fair. Denies chest pain though reports some chest tightness today . This is not like when he had MI. Reports SOB is better at rest. Has not moved from bed yet. Wears BiPAP at night. Now on NC at 4L/min.   Converted to sinus rhythm noted in EKG today . Persistent mild ST elevation noted ant leads   Amiodarone switched to pill  Remains on Heparin drip  On Lasix for diuresis . Urine output is good.  Renal indices are stable   Leukocytosis has resolved    Review of Systems   Constitutional: Positive for activity change and appetite change. Negative for fever.   HENT: Negative for sore throat.    Eyes: Negative for visual disturbance.   Respiratory: Positive for shortness of breath. Negative for cough and chest tightness.    Cardiovascular: Positive for chest pain (chest tightness ). Negative for palpitations and leg swelling.   Gastrointestinal: Negative for abdominal distention, abdominal pain, constipation, diarrhea, nausea and vomiting.   Endocrine: Negative for polyuria.   Genitourinary: Negative for decreased urine volume, dysuria, flank pain, frequency and hematuria.   Musculoskeletal: Negative for back pain and gait problem.   Skin: Negative for rash.   Neurological: Negative for syncope, speech difficulty, weakness, light-headedness and headaches.   Psychiatric/Behavioral: Negative for confusion, hallucinations and sleep disturbance.     Objective:     Vital Signs (Most Recent):  Temp: 96.9 °F (36.1 °C) (12/10/19 1105)  Pulse: 87 (12/10/19 1405)  Resp: 20 (12/10/19 1405)  BP: (!) 86/64 (12/10/19 1405)  SpO2: 100 % (12/10/19 1405) Vital Signs (24h Range):  Temp:  [96.9 °F (36.1 °C)-98.3 °F (36.8 °C)] 96.9 °F (36.1 °C)  Pulse:  [72-92] 87  Resp:  [14-24] 20  SpO2:  [81 %-100 %] 100 %  BP: ()/(38-73) 86/64     Weight: 73.3 kg (161 lb 9.6 oz)  Body mass index is 26.89 kg/m².    Intake/Output Summary (Last 24 hours) at 12/10/2019 1425  Last data filed at 12/10/2019 1400  Gross per  24 hour   Intake 1354.76 ml   Output 1185 ml   Net 169.76 ml      Physical Exam   Constitutional: He is oriented to person, place, and time. He appears well-developed and well-nourished. No distress.   HENT:   Head: Normocephalic and atraumatic.   Mouth/Throat: No oropharyngeal exudate.   Eyes: Pupils are equal, round, and reactive to light. Conjunctivae and EOM are normal.   Neck: Normal range of motion. Neck supple. No JVD present. No thyromegaly present.   Cardiovascular: Normal rate, regular rhythm and normal heart sounds.   No murmur heard.  Pulmonary/Chest: Effort normal and breath sounds normal. No respiratory distress. He has no wheezes. He has no rales. He exhibits no tenderness.   No wheezes or crackles    Abdominal: Soft. Bowel sounds are normal. He exhibits no distension. There is no tenderness. There is no rebound and no guarding.   Musculoskeletal: Normal range of motion. He exhibits no edema.   Lymphadenopathy:     He has no cervical adenopathy.   Neurological: He is alert and oriented to person, place, and time. He displays normal reflexes. No cranial nerve deficit or sensory deficit.   Skin: Skin is warm and dry. No rash noted. He is not diaphoretic.   Psychiatric: He has a normal mood and affect.       Significant Labs:   CBC:   Recent Labs   Lab 12/09/19  0322 12/10/19  0441   WBC 12.36 10.30   HGB 14.7 13.8*   HCT 45.5 41.8    252     CMP:   Recent Labs   Lab 12/09/19  0321 12/09/19  1124 12/10/19  0441    135* 135*   K 3.9 3.8 3.9   CL 96 95 94*   CO2 28 29 30*   GLU 97 139* 135*   BUN 49* 50* 53*   CREATININE 1.5* 1.4 1.5*   CALCIUM 9.1 8.7 9.0   PROT 6.7  --  6.5   ALBUMIN 2.9*  --  2.7*   BILITOT 1.5*  --  0.9   ALKPHOS 80  --  71   AST 78*  --  42*   ALT 52*  --  42   ANIONGAP 14 11 11   EGFRNONAA 46* 49* 46*       Significant Imaging:

## 2019-12-10 NOTE — ASSESSMENT & PLAN NOTE
Post PCI  Echo shows severely reduced EF 15%, diastolic dysfunction, and elevated PA pressure  On ASA, plavix, statin, BB and ACEi  Concern for intermittent distress, continued troponin elevation, and EKG findings; discussed with cardiology - additional IV BB given; tridil infusion trial failed with hypotension; may need more aggressive support  Continue close ICU hemodynamic monitoring  12/8 - no significant changes overnight; continue BB, ASA, statin, ACEi; continue ICU hemodynamic monitoring  12/9 - continue BB, ASA, statin, ICU hemodynamic monitoring per cards  12/10 - overall status remains marginal; discussed with cards, will mobilize today and if BP decline may start low dose dobutamine

## 2019-12-10 NOTE — ASSESSMENT & PLAN NOTE
Likely s/t cardiogenic shock; monitor trend  Creatinine holding  If BP worsens will start low dose dobutamine

## 2019-12-10 NOTE — ASSESSMENT & PLAN NOTE
STEMI   S/p PCI with JOSE to proximal LAD (See operative Note)  Continue ASA/Statin/BB/Plavix  Topical nitrites;  Supplemental oxygen;   Consider CPAP for desaturations  Pain relief measures: hydrocodone and Prn morphine  Continue as per Cardiology;  Trend troponin   12/8/19-  Continue ASA, Plavix , high intensity statin, BB and ACEI   12/9/19-  Persistent ST elevation is noted ant leads suggestive of apical aneurysm and poor LV recovery   Continue ASA, Plavix, Statin, BB, ACEI   12/10/19-  As above

## 2019-12-10 NOTE — PLAN OF CARE
Pt not complaining of any chest pain this morning, only a little tightness without BiPAP. Cards wanted pt on NC and to mobilize today. Pt transferred to chair with 2 person assist with little difficulty. Pt's BP did decrease, but returned to SBP 80-90s with MAPs>65 after settling. PT met with pt and helped him mobilize back to the bed. Pt appeared much stronger than previous attempt. Pt's 1200 metoprolol was held due to SBP<100. Pt later asked to get back in chair and ambulated to chair and back with minimal 1 person assist.     1740 pt requested BiPAP again to go to sleep. Pt currently resting comfortably with BiPAP on previous settings. VSS, bed locked in lowest position with side railsx2 raised, and call light within reach.     Family and friends at bedside throughout the day. Pt care plan reviewed with pt and wife. Will continue to monitor pt closely.     Bakari Hernandez RN  12/10/2019   5:38 PM

## 2019-12-10 NOTE — ASSESSMENT & PLAN NOTE
- New onset Post -MI   -Continue Diuresis   -Continue BB  -ACEI is discontinued by Cardiology due to hypotension or marginal BP   -Close monitoring in ICU   12/8/19-  Good diuresis is noted with IV Furosemide   Started back on ACEI   Continue BB  12/9/19-  Continue diuresis , BB, ACEI   12/10/19-  Clinically pt seems to be compensating   Continue diuresis .  BB and ACEI as BP permits

## 2019-12-10 NOTE — SUBJECTIVE & OBJECTIVE
Review of Systems   Constitutional: Positive for malaise/fatigue. Negative for chills and fever.   HENT: Negative.    Respiratory: Positive for shortness of breath (intermittent).    Cardiovascular:        Intermittent chest tightness   Gastrointestinal: Negative for nausea and vomiting.   Musculoskeletal: Positive for myalgias.   Skin: Negative.    Psychiatric/Behavioral: The patient is not nervous/anxious.          Objective:     Vital Signs (Most Recent):  Temp: 97.6 °F (36.4 °C) (12/10/19 0705)  Pulse: 88 (12/10/19 1005)  Resp: 17 (12/10/19 1005)  BP: (!) 89/67 (12/10/19 1005)  SpO2: 100 % (12/10/19 1005) Vital Signs (24h Range):  Temp:  [97 °F (36.1 °C)-98.3 °F (36.8 °C)] 97.6 °F (36.4 °C)  Pulse:  [73-99] 88  Resp:  [11-22] 17  SpO2:  [93 %-100 %] 100 %  BP: ()/(51-73) 89/67     Weight: 73.3 kg (161 lb 9.6 oz)  Body mass index is 26.89 kg/m².      Intake/Output Summary (Last 24 hours) at 12/10/2019 1030  Last data filed at 12/10/2019 1000  Gross per 24 hour   Intake 1325.69 ml   Output 1110 ml   Net 215.69 ml       Physical Exam   Constitutional: He is oriented to person, place, and time. He appears ill. Nasal cannula in place.   HENT:   Head: Atraumatic.   Eyes: Pupils are equal, round, and reactive to light. Conjunctivae are normal.   Neck: No tracheal deviation present.   Cardiovascular: Normal rate and regular rhythm.   No murmur heard.  Pulses:       Radial pulses are 1+ on the right side, and 1+ on the left side.        Dorsalis pedis pulses are 1+ on the right side, and 1+ on the left side.   Pulmonary/Chest: Effort normal. He has decreased breath sounds. He has no wheezes. He has no rales.   Abdominal: Soft. Bowel sounds are normal.   Musculoskeletal: He exhibits no edema.   Neurological: He is alert and oriented to person, place, and time.   Skin: Skin is dry. Capillary refill takes less than 2 seconds. No cyanosis.            Vents:  Oxygen Concentration (%): 36 (12/10/19  1005)    Lines/Drains/Airways     Peripherally Inserted Central Catheter Line                 PICC Double Lumen 12/09/19 0820 right brachial 1 day          Drain                 Urethral Catheter 12/07/19 1000 Latex 3 days          Peripheral Intravenous Line                 Peripheral IV - Single Lumen 12/05/19 2032 18 G Right Antecubital 4 days                Significant Labs:    CBC/Anemia Profile:  Recent Labs   Lab 12/09/19  0322 12/10/19  0441   WBC 12.36 10.30   HGB 14.7 13.8*   HCT 45.5 41.8    252   MCV 98 96   RDW 13.2 12.9        Chemistries:  Recent Labs   Lab 12/09/19  0321 12/09/19  1124 12/10/19  0441    135* 135*   K 3.9 3.8 3.9   CL 96 95 94*   CO2 28 29 30*   BUN 49* 50* 53*   CREATININE 1.5* 1.4 1.5*   CALCIUM 9.1 8.7 9.0   ALBUMIN 2.9*  --  2.7*   PROT 6.7  --  6.5   BILITOT 1.5*  --  0.9   ALKPHOS 80  --  71   ALT 52*  --  42   AST 78*  --  42*   MG 2.6  --  2.8*   PHOS  --   --  3.4       All pertinent labs within the past 24 hours have been reviewed.    Significant Imaging:  I have reviewed all pertinent imaging results/findings within the past 24 hours.

## 2019-12-10 NOTE — PHYSICIAN QUERY
PT Name: Raleigh Walsh  MR #: [id number]    Physician Query Form - Atrial Flutter Specificity Clarification     CDS/: Amie Philippe               Contact information: [email or web address]    This form is a permanent document in the medical record.     Query Date: December 10, 2019    By submitting this query, we are merely seeking further clarification of documentation. Please utilize your independent clinical judgment when addressing the question(s) below.    The medical record contains the following:   Indicators     Supporting Clinical Findings Location in Medical Record   x Atrial Flutter Atrial flutter, New- onset   -HR controlled at time of exam  -Continue amio gtt  -Continue BB as BP permits  -Continue heparin gtt     12/10/19  -Converted to SR this AM  -Switch amiodarone gtt to po  -No BB given borderline BP  -Continue heparin gtt for now; may not need long term AC given brief duration of arrhythmia; will re-evaluate Cards note 12/10   x EKG results Atrial flutter with variable A-V block  Left anterior fascicular block  Anterior infarct (cited on or before 05-DEC-2019)  Abnormal ECG    Normal sinus rhythm  Low voltage QRS  Left anterior fascicular block  Possible Anterolateral infarct (cited on or before 05-DEC-2019)  Abnormal ECG  When compared with ECG of 09-DEC-2019 04:54,  Sinus rhythm has replaced Atrial flutter EKG 12/9          EKG 12/10   x Medication amiodarone tablet 200 mg   Dose: 200 mg  Freq: 2 times daily Route: Oral  Start: 12/10/19 1045    metoprolol injection 2.5 mg   Dose: 2.5 mg  Freq: Once Route: IV  Start: 12/07/19 0828 End: 12/07/19 0830    amiodarone 360 mg/200 mL (1.8 mg/mL) infusion   Rate: 16.7 mL/hr Dose: 0.5 mg/min  Freq: Continuous Route: IV  Start: 12/09/19 1145 End: 12/10/19 1033 MAR           MAR           MAR       Treatment      Other       Provider, please further specify the Atrial Flutter diagnosis.    [   ] Atypical   [   ] Type I   [   ] Type II   [  x ]  Typical   [   ] Other (please specify):   [  ] Clinically Undetermined         Please document in your progress notes daily for the duration of treatment until resolved, and include in your discharge summary.

## 2019-12-10 NOTE — ASSESSMENT & PLAN NOTE
Amiodarone infusion to enteral today  Continue anticoagulation with heparin infusion; brief a flutter, may not need long term anticoagulation, re-eval in am

## 2019-12-10 NOTE — PROGRESS NOTES
[facility] Hospital Medicine  Progress Note    Patient Name: Raleigh Walsh  MRN: [id number]  Patient Class: IP- Inpatient   Admission Date: 12/5/2019  Length of Stay: 5 days  Attending Physician: Gilmar Florence MD  Primary Care Provider: Akhil Smith MD        Subjective:     Principal Problem:STEMI (ST elevation myocardial infarction)        HPI:  75 YO WM with known CAD S/P MI and prior stenting;  Presented to ED at [facility] after experiencing chest pain while at a ballgame; He ruled in foe a STEMI; was treated with TPA and transferred here; He was taken to the cath lab where he underwent PCI with stent placed to the proximal LAD by Dr. Alcazar.  He was transferred to the ICU in stable condition.    Overview/Hospital Course:  12/7/19-  Pt c/o new onset SOB on minimal exertion . Denies chest pain.   Remains on O2 at 5L/min. Pt is noted to be tachycardic and tachypnic.   Episodes of nausea and emesis x 3 yesterday but none today   EKG revealed sinus tachycardia , ST elevation ant and lat leads and no longer PVC's   Echo revealed severely depressed left vent systolic function with EF of 15-20%, PA pressure 43.   Cardiology is recommending diuresis with IV furosemide , continue BB and start Tridil drip.   Prognosis is guarded at this time.   12/8/19-  Pt is seen at bedside . Remains mildly tachycardic and tachypnic .  Reports SOB is better today . Good urine output noted with diuresis 1.5 L yesterday   Remains on O2 at 5L/min. Tolerated BiPAP well last night.     Tridil drip discontinued yesterday due to hypotension   Remains on low dose BB and Ramipril restarted at 2.5 mg daily   Prognosis guarded.     12/9/19-  Overnight developed Atrial flutter and started on Amiodarone ggt and Heparin ggt. Amiodarone bolus was not given due to marginal BP.   On BB . Rate is fairly controlled.  EKG revealed persistent ST elevation anterior leads     Remains on O2 at 5 L/min via NC and nightly BiPAP  Good  urine output 1.3 L yesterday   Leukocytosis has resolved . Blood cultures are negative     Pt denies chest pain . Reports SOB is better at rest     12/10/19-  Pt feels fair. Denies chest pain though reports some chest tightness today . This is not like when he had MI. Reports SOB is better at rest. Has not moved from bed yet. Wears BiPAP at night. Now on NC at 4L/min.   Converted to sinus rhythm noted in EKG today . Persistent mild ST elevation noted ant leads   Amiodarone switched to pill  Remains on Heparin drip  On Lasix for diuresis . Urine output is good.  Renal indices are stable   Leukocytosis has resolved     Interval History:   Pt feels fair. Denies chest pain though reports some chest tightness today . This is not like when he had MI. Reports SOB is better at rest. Has not moved from bed yet. Wears BiPAP at night. Now on NC at 4L/min.   Converted to sinus rhythm noted in EKG today . Persistent mild ST elevation noted ant leads   Amiodarone switched to pill  Remains on Heparin drip  On Lasix for diuresis . Urine output is good.  Renal indices are stable   Leukocytosis has resolved    Review of Systems   Constitutional: Positive for activity change and appetite change. Negative for fever.   HENT: Negative for sore throat.    Eyes: Negative for visual disturbance.   Respiratory: Positive for shortness of breath. Negative for cough and chest tightness.    Cardiovascular: Positive for chest pain (chest tightness ). Negative for palpitations and leg swelling.   Gastrointestinal: Negative for abdominal distention, abdominal pain, constipation, diarrhea, nausea and vomiting.   Endocrine: Negative for polyuria.   Genitourinary: Negative for decreased urine volume, dysuria, flank pain, frequency and hematuria.   Musculoskeletal: Negative for back pain and gait problem.   Skin: Negative for rash.   Neurological: Negative for syncope, speech difficulty, weakness, light-headedness and headaches.    Psychiatric/Behavioral: Negative for confusion, hallucinations and sleep disturbance.     Objective:     Vital Signs (Most Recent):  Temp: 96.9 °F (36.1 °C) (12/10/19 1105)  Pulse: 87 (12/10/19 1405)  Resp: 20 (12/10/19 1405)  BP: (!) 86/64 (12/10/19 1405)  SpO2: 100 % (12/10/19 1405) Vital Signs (24h Range):  Temp:  [96.9 °F (36.1 °C)-98.3 °F (36.8 °C)] 96.9 °F (36.1 °C)  Pulse:  [72-92] 87  Resp:  [14-24] 20  SpO2:  [81 %-100 %] 100 %  BP: ()/(38-73) 86/64     Weight: 73.3 kg (161 lb 9.6 oz)  Body mass index is 26.89 kg/m².    Intake/Output Summary (Last 24 hours) at 12/10/2019 1425  Last data filed at 12/10/2019 1400  Gross per 24 hour   Intake 1354.76 ml   Output 1185 ml   Net 169.76 ml      Physical Exam   Constitutional: He is oriented to person, place, and time. He appears well-developed and well-nourished. No distress.   HENT:   Head: Normocephalic and atraumatic.   Mouth/Throat: No oropharyngeal exudate.   Eyes: Pupils are equal, round, and reactive to light. Conjunctivae and EOM are normal.   Neck: Normal range of motion. Neck supple. No JVD present. No thyromegaly present.   Cardiovascular: Normal rate, regular rhythm and normal heart sounds.   No murmur heard.  Pulmonary/Chest: Effort normal and breath sounds normal. No respiratory distress. He has no wheezes. He has no rales. He exhibits no tenderness.   No wheezes or crackles    Abdominal: Soft. Bowel sounds are normal. He exhibits no distension. There is no tenderness. There is no rebound and no guarding.   Musculoskeletal: Normal range of motion. He exhibits no edema.   Lymphadenopathy:     He has no cervical adenopathy.   Neurological: He is alert and oriented to person, place, and time. He displays normal reflexes. No cranial nerve deficit or sensory deficit.   Skin: Skin is warm and dry. No rash noted. He is not diaphoretic.   Psychiatric: He has a normal mood and affect.       Significant Labs:   CBC:   Recent Labs   Lab 12/09/19  0322  12/10/19  0441   WBC 12.36 10.30   HGB 14.7 13.8*   HCT 45.5 41.8    252     CMP:   Recent Labs   Lab 12/09/19  0321 12/09/19  1124 12/10/19  0441    135* 135*   K 3.9 3.8 3.9   CL 96 95 94*   CO2 28 29 30*   GLU 97 139* 135*   BUN 49* 50* 53*   CREATININE 1.5* 1.4 1.5*   CALCIUM 9.1 8.7 9.0   PROT 6.7  --  6.5   ALBUMIN 2.9*  --  2.7*   BILITOT 1.5*  --  0.9   ALKPHOS 80  --  71   AST 78*  --  42*   ALT 52*  --  42   ANIONGAP 14 11 11   EGFRNONAA 46* 49* 46*       Significant Imaging:       Assessment/Plan:      * STEMI (ST elevation myocardial infarction)  STEMI   S/p PCI with JOSE to proximal LAD (See operative Note)  Continue ASA/Statin/BB/Plavix  Topical nitrites;  Supplemental oxygen;   Consider CPAP for desaturations  Pain relief measures: hydrocodone and Prn morphine  Continue as per Cardiology;  Trend troponin   12/8/19-  Continue ASA, Plavix , high intensity statin, BB and ACEI   12/9/19-  Persistent ST elevation is noted ant leads suggestive of apical aneurysm and poor LV recovery   Continue ASA, Plavix, Statin, BB, ACEI   12/10/19-  As above     Acute systolic congestive heart failure, NYHA class 3  - New onset Post -MI   -Continue Diuresis   -Continue BB  -ACEI is discontinued by Cardiology due to hypotension or marginal BP   -Close monitoring in ICU   12/8/19-  Good diuresis is noted with IV Furosemide   Started back on ACEI   Continue BB  12/9/19-  Continue diuresis , BB, ACEI   12/10/19-  Clinically pt seems to be compensating   Continue diuresis .  BB and ACEI as BP permits       Elevated LFTs  -Likely hepatic congestion due to acute CHF.   -Continue Diuresis   -Improving with diuresis   -Resolving     Paroxysmal VT  - Likely reperfusion arrhythmia   -continue BB      PENNY (acute kidney injury)  -Baseline creatinine 1.1 to 1.2  -Monitor Renal indices while on diuretic therapy   -Stable at 1.5 to 1.7   -Stable       Atrial flutter, New- onset   12/9/19  - New onset Atrial flutter  Post MI/  Post PCI   -Started on Amiodarone ggt and Heparin ggt  -Continue BB  -Continue ACEI  12/10/19-  Converted to sinus rhythm   Switched to oral Amiodarone   Remains on heparin ggt         Leukocytosis, likely reactive   - Likely reactive   -Procalcitonin is marginally elevated   -CXR- 12/8/19- bilateral interstitial infiltrate likely  due to pul congestion related to acute CHF   -Get Blood cultures x 2  -Get UA   -Monitor   -Resolved - 12/9/19      YESENIA (obstructive sleep apnea)  -Encouraged nightly C-PAP        VTE Risk Mitigation (From admission, onward)         Ordered     heparin 25,000 units in dextrose 5% 250 mL (100 units/mL) infusion LOW INTENSITY nomogram - [facility]  Continuous     Question:  Heparin Infusion Adjustment (DO NOT MODIFY ANSWER)  Answer:  \\[email or web address]\epic\Images\Pharmacy\HeparinInfusions\heparin LOW INTENSITY nomogram for [facility] [id number]    12/09/19 0628     heparin 25,000 units in dextrose 5% (100 units/ml) IV bolus from bag - ADDITIONAL PRN BOLUS - 30 units/kg  As needed (PRN)     Question:  Heparin Infusion Adjustment (DO NOT MODIFY ANSWER)  Answer:  \[email or web address]\epic\Images\Pharmacy\HeparinInfusions\heparin LOW INTENSITY nomogram for [facility] [id number]    12/09/19 0628     heparin 25,000 units in dextrose 5% (100 units/ml) IV bolus from bag - ADDITIONAL PRN BOLUS - 60 units/kg  As needed (PRN)     Question:  Heparin Infusion Adjustment (DO NOT MODIFY ANSWER)  Answer:  \[email or web address]\epic\Images\Pharmacy\HeparinInfusions\heparin LOW INTENSITY nomogram for [facility] [id number]    12/09/19 0628                Critical care time spent on the evaluation and treatment of severe organ dysfunction, review of pertinent labs and imaging studies, discussions with consulting providers and discussions with patient/family: 30  minutes.      Gilmar Florence MD  Department of Hospital Medicine   [facility]

## 2019-12-10 NOTE — ASSESSMENT & PLAN NOTE
75 y/o male woth PMHx for CADHx of PCI, HTN, HLP presented to [facility] ER with anterior MI and tx with TNK. Pt transferred to [facility]. Pt initially reperfused clinically but later on with CP.  Pt taken to the cath lab for postinfarct angina.    12/6/19  -s/p PCI of LAD per Dr. Alcazar  -ECHO pending  -Continue ASA, Statin, BB, ACEi, Plavix  -Check FLP  -Keep in ICU for today  -Dash diet, 2 gm sodium restriction  -1.5L Fluid restriction  -Repeat labs in AM    12/7/19  -Echo revealed EF 15-20%, +WMA's, PHTN, DD  -Add Tridil gtt today  -Stop ACEi today given need for Tridil gtt for symptom management   -Continue ASA, Statin, Plavix, BB, IV lasix BID, Tridil gtt  -Further recs to follow pending clinical course    12/8/19  -Continue ASA, Statin, Plavix, IV lasix BID, IV lopressor q6H  -Add ramipril if BP can tolerate  -keep in ICU for close monitoring given clinical condition with little/no improvement    12/9/19  -No chest pain overnight, SOB slowly improving  -Continue ASA, statin, Plavix, IV Lasix, IV lopressor  -Hold ACEi if needed given borderline BP  -Keep in ICU    12/10/19  -Slowly progressing post MI  -Continue ASA, statin, Plavix  -Continue other meds as BP permits  -Will further optimize as clinical condition permits  -Will consider low dose dobutamine, if needed but would like to try to avoid if possible given risk of recurrent aflutter/arrhythmias

## 2019-12-10 NOTE — ASSESSMENT & PLAN NOTE
Continue BB  Keep K+ >4 and Mag >2  Continue telemetry monitoring  Keep in ICU for now    12/7  -continue BB    12/8  -keep K+>4 and Mag>2  -Continue BB as tolerated    12/9/19  -Keep electrolytes WNL  -Continue BB as BP permits    12/10/19  -See plan above  -LifeVest for SCD prevention

## 2019-12-10 NOTE — ASSESSMENT & PLAN NOTE
12/9/19  - New onset Atrial flutter  Post MI/ Post PCI   -Started on Amiodarone ggt and Heparin ggt  -Continue BB  -Continue ACEI  12/10/19-  Converted to sinus rhythm   Switched to oral Amiodarone   Remains on heparin ggt

## 2019-12-10 NOTE — SUBJECTIVE & OBJECTIVE
Review of Systems   Constitution: Positive for malaise/fatigue.   HENT: Negative.    Eyes: Negative.    Cardiovascular: Positive for dyspnea on exertion.   Respiratory: Positive for shortness of breath.    Endocrine: Negative.    Hematologic/Lymphatic: Negative.    Skin: Negative.    Musculoskeletal: Negative.    Gastrointestinal: Negative.    Genitourinary: Negative.    Neurological: Positive for weakness.   Psychiatric/Behavioral: Negative.    Allergic/Immunologic: Negative.      Objective:     Vital Signs (Most Recent):  Temp: 97.6 °F (36.4 °C) (12/10/19 0705)  Pulse: 88 (12/10/19 1005)  Resp: 17 (12/10/19 1005)  BP: (!) 89/67 (12/10/19 1005)  SpO2: 100 % (12/10/19 1005) Vital Signs (24h Range):  Temp:  [97 °F (36.1 °C)-98.3 °F (36.8 °C)] 97.6 °F (36.4 °C)  Pulse:  [73-99] 88  Resp:  [11-22] 17  SpO2:  [93 %-100 %] 100 %  BP: ()/(51-73) 89/67     Weight: 73.3 kg (161 lb 9.6 oz)  Body mass index is 26.89 kg/m².     SpO2: 100 %  O2 Device (Oxygen Therapy): nasal cannula      Intake/Output Summary (Last 24 hours) at 12/10/2019 1026  Last data filed at 12/10/2019 1000  Gross per 24 hour   Intake 1325.69 ml   Output 1110 ml   Net 215.69 ml       Lines/Drains/Airways     Peripherally Inserted Central Catheter Line                 PICC Double Lumen 12/09/19 0820 right brachial 1 day          Drain                 Urethral Catheter 12/07/19 1000 Latex 3 days          Peripheral Intravenous Line                 Peripheral IV - Single Lumen 12/05/19 2032 18 G Right Antecubital 4 days                Physical Exam   Constitutional: He is oriented to person, place, and time. He appears well-developed and well-nourished. No distress.   On NC  Ill appearing   HENT:   Head: Normocephalic and atraumatic.   Eyes: Pupils are equal, round, and reactive to light. Right eye exhibits no discharge. Left eye exhibits no discharge.   Neck: Neck supple. No JVD present.   Cardiovascular: Normal rate, regular rhythm, S1 normal, S2  normal and normal heart sounds.   No murmur heard.  Pulmonary/Chest: Effort normal and breath sounds normal. No respiratory distress. He has no wheezes. He has no rales.   Abdominal: Soft. He exhibits no distension.   Musculoskeletal: He exhibits no edema.   Neurological: He is alert and oriented to person, place, and time.   Skin: Skin is warm and dry. He is not diaphoretic. No erythema.   Right groin access site C/D/I; no bleeding, erythema, or drainage    Cool extremities   Psychiatric: He has a normal mood and affect. His behavior is normal. Thought content normal.   Nursing note and vitals reviewed.      Significant Labs:   CMP   Recent Labs   Lab 12/09/19  0321 12/09/19  1124 12/10/19  0441    135* 135*   K 3.9 3.8 3.9   CL 96 95 94*   CO2 28 29 30*   GLU 97 139* 135*   BUN 49* 50* 53*   CREATININE 1.5* 1.4 1.5*   CALCIUM 9.1 8.7 9.0   PROT 6.7  --  6.5   ALBUMIN 2.9*  --  2.7*   BILITOT 1.5*  --  0.9   ALKPHOS 80  --  71   AST 78*  --  42*   ALT 52*  --  42   ANIONGAP 14 11 11   ESTGFRAFRICA 53* 57* 53*   EGFRNONAA 46* 49* 46*   , CBC   Recent Labs   Lab 12/09/19  0322 12/10/19  0441   WBC 12.36 10.30   HGB 14.7 13.8*   HCT 45.5 41.8    252   , Troponin   Recent Labs   Lab 12/09/19  0534   TROPONINI 38.671*    and All pertinent lab results from the last 24 hours have been reviewed.    Significant Imaging: Echocardiogram:   2D echo with color flow doppler:   Results for orders placed or performed during the hospital encounter of 12/05/19   2D echo with color flow doppler   Result Value Ref Range    QEF 25 (A) 55 - 65    Pericardial Effusion SMALL (A)     Narrative    Date of Procedure: 12/07/2019        TEST DESCRIPTION   Technical Quality: This is a technically adequate study.     Aorta: The aortic root is normal in size.     Left Atrium: The left atrium is normal in size.     Left Ventricle: The left ventricle is normal in size. LV wall thickness is normal. The following segments were akinetic:  apical septum, apical lateral wall, apical inferior wall, apical anterior wall.  The following segments were mildly hypokinetic: basal anterior wall.  The following segments were severely hypokinetic: mid inferoseptum, mid anterior wall.  Left ventricular systolic function appears severely depressed. Visually estimated ejection fraction is 25-30%.     Diastolic indices:     Right Atrium: The right atrium is normal in size.     Right Ventricle: The right ventricle is normal in size. Global right ventricular systolic function appears normal.     Aortic Valve:  The aortic valve is normal in structure.     Mitral Valve:  The mitral valve is normal in structure.     Pulmonary Valve:  The pulmonic valve is not well seen.     Pericardium: There is evidence of a small postero-lateral pericardial effusion.     IVC: IVC is enlarged but collapses > 50% with a sniff, suggesting intermediate right atrial pressure of 8 mmHg.     Intracavitary: There is no evidence of intracavity mass, thrombi, or vegetation.         CONCLUSIONS     1 - Wall motion abnormalities.     2 - Severely depressed left ventricular systolic function (EF 25-30%).     3 - Normal right ventricular systolic function .     4 - Focal small pericardial effusion on right side. No tamponade    5 - Intermediate central venous pressure.     6 - Limited echo study to rule out mechanical rupture.            This document has been electronically    SIGNED BY: Lamont Andres MD On: 12/08/2019 10:28   , EKG: Reviewed and X-Ray: CXR: X-Ray Chest 1 View (CXR):   Results for orders placed or performed during the hospital encounter of 12/05/19   X-Ray Chest 1 View    Narrative    EXAMINATION:  XR CHEST 1 VIEW    CLINICAL HISTORY:  PICC placement;    TECHNIQUE:  Single frontal view of the chest was performed.    COMPARISON:  Chest radiograph 12/08/2019 with priors    FINDINGS:  Cardiac leads project over the chest.  Interval placement of a right-sided peripherally inserted central  venous catheter with tip projecting in the SVC.  Cardiomediastinal silhouette is within normal limits and stable.  Persistent interstitial infiltrates.  No large consolidative opacity, effusion or pneumothorax.  Osseous structures appear intact.      Impression    Interval placement of a right-sided PICC with tip projecting in the SVC.    Stable bilateral interstitial infiltrates.      Electronically signed by: Henry Martin  Date:    12/09/2019  Time:    09:17    and X-Ray Chest PA and Lateral (CXR): No results found for this visit on 12/05/19.

## 2019-12-11 NOTE — ASSESSMENT & PLAN NOTE
Post PCI  Echo shows severely reduced EF 15%, diastolic dysfunction, and elevated PA pressure  On ASA, plavix, statin, BB and ACEi  Concern for intermittent distress, continued troponin elevation, and EKG findings; discussed with cardiology - additional IV BB given; tridil infusion trial failed with hypotension; may need more aggressive support  Continue close ICU hemodynamic monitoring  12/8 - no significant changes overnight; continue BB, ASA, statin, ACEi; continue ICU hemodynamic monitoring  12/9 - continue BB, ASA, statin, ICU hemodynamic monitoring per cards  12/10 - overall status remains marginal; discussed with cards, will mobilize today and if BP decline may start low dose dobutamine  12/11 - up in chair, looks good and feels well; brief overnight mild chest pain seems related to orthopnea but he wonders if related to food, continue medical optimization, if continues to feel food related, would consider esophagram

## 2019-12-11 NOTE — PT/OT/SLP PROGRESS
"Physical Therapy  Treatment    Raleigh Walsh   MRN: [id number]   Admitting Diagnosis: STEMI (ST elevation myocardial infarction)    PT Received On: 12/11/19  PT Start Time: 1535     PT Stop Time: 1600    PT Total Time (min): 25 min       Billable Minutes:  Gait Training 25    Treatment Type: Treatment  PT/PTA: PTA     PTA Visit Number: 1       General Precautions: Standard, fall, respiratory  Orthopedic Precautions: N/A   Braces:   0         Subjective:  Communicated with epic and nurse: [place],  prior to session.  Pt agreed to tx. "what ever you want me to do" reports no pain today , only SOB>     Pain/Comfort  Pain Rating 1: 0/10  Pain Rating Post-Intervention 2: 0/10    Objective:   Patient found with: blood pressure cuff, peripheral IV, oxygen, telemetry, pulse ox (continuous)    Functional Mobility:  Bed Mobility:     Supine to sit: CGA      Transfers:    Sit to stand: CGA   Stand to sit: CGA    Gait:     10 feet x 2 trails. Sat in the chair between trials to recover. No assistive device due to limited space in the room. CGA, limited by sob and oxygen sats briefly dropping to 88%. Pt was a little impulsive.             AM-PAC 6 CLICK MOBILITY  How much help from another person does this patient currently need?   1 = Unable, Total/Dependent Assistance  2 = A lot, Maximum/Moderate Assistance  3 = A little, Minimum/Contact Guard/Supervision  4 = None, Modified [place]/Independent    Turning over in bed (including adjusting bedclothes, sheets and blankets)?: 4  Sitting down on and standing up from a chair with arms (e.g., wheelchair, bedside commode, etc.): 3  Moving from lying on back to sitting on the side of the bed?: 3  Moving to and from a bed to a chair (including a wheelchair)?: 3  Need to walk in hospital room?: 3  Climbing 3-5 steps with a railing?: 1  Basic Mobility Total Score: 17    AM-PAC Raw Score CMS G-Code Modifier Level of Impairment Assistance   6 % Total / Unable   7 - 9 CM 80 - " 100% Maximal Assist   10 - 14 CL 60 - 80% Moderate Assist   15 - 19 CK 40 - 60% Moderate Assist   20 - 22 CJ 20 - 40% Minimal Assist   23 CI 1-20% SBA / CGA   24 CH 0% Independent/ Mod I     Patient left up in chair with all lines intact, call button in reach, nurse notified and NP present.    Assessment:  Raleigh Walsh is a 74 y.o. male with a medical diagnosis of STEMI (ST elevation myocardial infarction) and presents with improved activity tolerance today but still very limited by sob and poor endurance. VERY cooperative and motivated. Impulsive at times.     Rehab identified problem list/impairments: Rehab identified problem list/impairments: weakness, impaired functional mobilty, decreased safety awareness, gait instability, impaired endurance, impaired balance, impaired self care skills, decreased lower extremity function, impaired cardiopulmonary response to activity    Rehab potential is good.    Activity tolerance: Good    Discharge recommendations: Discharge Facility/Level of Care Needs: home health PT(home with wife. With HH pending progress)     Barriers to discharge:      Equipment recommendations: Equipment Needed After Discharge: none     GOALS:   Multidisciplinary Problems     Physical Therapy Goals        Problem: Physical Therapy Goal    Goal Priority Disciplines Outcome Goal Variances Interventions   Physical Therapy Goal     PT, PT/OT      Description:  stg's to be met by 12/17/19  1. Patient will perform supine to/from sit mod indep  2. Patient will perform sit to/from stand mod indep no AD  3. Patient will ambulate 300ft least AD spv or less no AD                    PLAN:    Patient to be seen 5 x/week  to address the above listed problems via gait training, therapeutic activities, therapeutic exercises  Plan of Care expires: 12/17/19  Plan of Care reviewed with: patient         Deb Alstonran, PTA  12/11/2019

## 2019-12-11 NOTE — ASSESSMENT & PLAN NOTE
-HR controlled at time of exam  -Continue amio gtt  -Continue BB as BP permits  -Continue heparin gtt    12/10/19  -Converted to SR this AM  -Switch amiodarone gtt to po  -No BB given borderline BP  -Continue heparin gtt for now; may not need long term AC given brief duration of arrhythmia; will re-evaluate    12/11/19  -Intermittent afib noted overnight

## 2019-12-11 NOTE — ASSESSMENT & PLAN NOTE
Likely s/t cardiogenic shock; monitor trend  Creatinine holding  If BP worsens will start low dose dobutamine  12/11 - resolved, monitor creatinine

## 2019-12-11 NOTE — PLAN OF CARE
Pt aaox3.  Pt is SR 70-90 with ST depression on the heart monitor,MD aware.  Pt did not have any episodes of severe SOB, mild SOB at rest.  BP remaining borderline, MAP 60- this afternoon, Murali WALDEN made aware of MAP 60s-70s.  Metoprolol held.  Resp: sats stable on 4L N[place] and on bipap.  : removed this shift, UOP minimal.  Pt turned and repositioned with use of pillows independently.  PIV and PICC intact with no redness, swelling or drainage.  Bed low, wheels locked, alarms audible, call light in reach.  Plan of care reviewed with pt and family.  Pt down graded to tele status, awaiting bed availability.  Pt and family verbalizes understanding. Will continue to monitor.  Temp:  [97.7 °F (36.5 °C)-98.4 °F (36.9 °C)]   Pulse:  []   Resp:  [12-24]   BP: ()/(43-78)   SpO2:  [97 %-100 %]    I/O this shift:  In: 1348.8 [P.O.:1320; I.V.:28.8]  Out: 890 [Urine:890]

## 2019-12-11 NOTE — SUBJECTIVE & OBJECTIVE
Review of Systems   Constitution: Positive for malaise/fatigue.   Eyes: Negative.    Cardiovascular: Positive for dyspnea on exertion.   Respiratory: Positive for shortness of breath.    Endocrine: Negative.    Hematologic/Lymphatic: Negative.    Skin: Negative.    Musculoskeletal: Positive for arthritis and joint pain.   Gastrointestinal: Negative.    Genitourinary: Negative.    Neurological: Positive for weakness.   Psychiatric/Behavioral: Negative.    Allergic/Immunologic: Negative.      Objective:     Vital Signs (Most Recent):  Temp: 98.1 °F (36.7 °C) (12/11/19 0705)  Pulse: 95 (12/11/19 1000)  Resp: (!) 23 (12/11/19 1000)  BP: (!) 86/65 (12/11/19 1000)  SpO2: 99 % (12/11/19 1000) Vital Signs (24h Range):  Temp:  [97 °F (36.1 °C)-98.1 °F (36.7 °C)] 98.1 °F (36.7 °C)  Pulse:  [] 95  Resp:  [12-24] 23  SpO2:  [81 %-100 %] 99 %  BP: ()/(57-78) 86/65     Weight: 73.3 kg (161 lb 9.6 oz)  Body mass index is 26.89 kg/m².     SpO2: 99 %  O2 Device (Oxygen Therapy): BiPAP      Intake/Output Summary (Last 24 hours) at 12/11/2019 1139  Last data filed at 12/11/2019 1000  Gross per 24 hour   Intake 662.01 ml   Output 1725 ml   Net -1062.99 ml       Lines/Drains/Airways     Peripherally Inserted Central Catheter Line                 PICC Double Lumen 12/09/19 0820 right brachial 2 days          Drain                 Urethral Catheter 12/07/19 1000 Latex 4 days          Peripheral Intravenous Line                 Peripheral IV - Single Lumen 12/05/19 2032 18 G Right Antecubital 5 days                Physical Exam   Constitutional: He is oriented to person, place, and time. He appears well-developed and well-nourished. No distress.   On supplemental O2   HENT:   Head: Normocephalic and atraumatic.   Eyes: Pupils are equal, round, and reactive to light. Right eye exhibits no discharge. Left eye exhibits no discharge.   Neck: Neck supple. No JVD present.   Cardiovascular: Normal rate, regular rhythm, S1 normal,  S2 normal and normal heart sounds.   No murmur heard.  Pulmonary/Chest: Effort normal. No respiratory distress. He has no wheezes.   Diminished BS at bases   Abdominal: Soft. He exhibits no distension.   Musculoskeletal: He exhibits no edema.   Neurological: He is alert and oriented to person, place, and time.   Skin: Skin is warm and dry. He is not diaphoretic. No erythema.   Groin access site C/D/I; no bleeding erythema or drainage   Psychiatric: He has a normal mood and affect. His behavior is normal. Thought content normal.   Nursing note and vitals reviewed.      Significant Labs:   CMP   Recent Labs   Lab 12/10/19  0441 12/11/19  0332   * 136   K 3.9 3.3*   CL 94* 94*   CO2 30* 32*   * 104   BUN 53* 45*   CREATININE 1.5* 1.3   CALCIUM 9.0 8.9   PROT 6.5  --    ALBUMIN 2.7*  --    BILITOT 0.9  --    ALKPHOS 71  --    AST 42*  --    ALT 42  --    ANIONGAP 11 10   ESTGFRAFRICA 53* >60   EGFRNONAA 46* 54*   , CBC   Recent Labs   Lab 12/10/19  0441   WBC 10.30   HGB 13.8*   HCT 41.8      , Troponin No results for input(s): TROPONINI in the last 48 hours. and All pertinent lab results from the last 24 hours have been reviewed.    Significant Imaging: Echocardiogram:   2D echo with color flow doppler:   Results for orders placed or performed during the hospital encounter of 12/05/19   2D echo with color flow doppler   Result Value Ref Range    QEF 25 (A) 55 - 65    Pericardial Effusion SMALL (A)     Narrative    Date of Procedure: 12/07/2019        TEST DESCRIPTION   Technical Quality: This is a technically adequate study.     Aorta: The aortic root is normal in size.     Left Atrium: The left atrium is normal in size.     Left Ventricle: The left ventricle is normal in size. LV wall thickness is normal. The following segments were akinetic: apical septum, apical lateral wall, apical inferior wall, apical anterior wall.  The following segments were mildly hypokinetic: basal anterior wall.  The  following segments were severely hypokinetic: mid inferoseptum, mid anterior wall.  Left ventricular systolic function appears severely depressed. Visually estimated ejection fraction is 25-30%.     Diastolic indices:     Right Atrium: The right atrium is normal in size.     Right Ventricle: The right ventricle is normal in size. Global right ventricular systolic function appears normal.     Aortic Valve:  The aortic valve is normal in structure.     Mitral Valve:  The mitral valve is normal in structure.     Pulmonary Valve:  The pulmonic valve is not well seen.     Pericardium: There is evidence of a small postero-lateral pericardial effusion.     IVC: IVC is enlarged but collapses > 50% with a sniff, suggesting intermediate right atrial pressure of 8 mmHg.     Intracavitary: There is no evidence of intracavity mass, thrombi, or vegetation.         CONCLUSIONS     1 - Wall motion abnormalities.     2 - Severely depressed left ventricular systolic function (EF 25-30%).     3 - Normal right ventricular systolic function .     4 - Focal small pericardial effusion on right side. No tamponade    5 - Intermediate central venous pressure.     6 - Limited echo study to rule out mechanical rupture.            This document has been electronically    SIGNED BY: Lamont Andres MD On: 12/08/2019 10:28   , EKG: Reviewed and X-Ray: CXR: X-Ray Chest 1 View (CXR):   Results for orders placed or performed during the hospital encounter of 12/05/19   X-Ray Chest 1 View    Narrative    EXAMINATION:  XR CHEST 1 VIEW    CLINICAL HISTORY:  PICC placement;    TECHNIQUE:  Single frontal view of the chest was performed.    COMPARISON:  Chest radiograph 12/08/2019 with priors    FINDINGS:  Cardiac leads project over the chest.  Interval placement of a right-sided peripherally inserted central venous catheter with tip projecting in the SVC.  Cardiomediastinal silhouette is within normal limits and stable.  Persistent interstitial infiltrates.   No large consolidative opacity, effusion or pneumothorax.  Osseous structures appear intact.      Impression    Interval placement of a right-sided PICC with tip projecting in the SVC.    Stable bilateral interstitial infiltrates.      Electronically signed by: Henry Martin  Date:    12/09/2019  Time:    09:17    and X-Ray Chest PA and Lateral (CXR): No results found for this visit on 12/05/19.

## 2019-12-11 NOTE — SUBJECTIVE & OBJECTIVE
"Review of Systems   Constitutional: Negative for chills and fever.        Overall "feel better each day"   HENT: Negative for nosebleeds and tinnitus.    Respiratory: Positive for shortness of breath.    Cardiovascular: Positive for chest pain and palpitations.   Gastrointestinal: Negative.    Genitourinary: Negative.    Musculoskeletal: Negative.    Skin:        Wants to shave face; discussed electric razor   Psychiatric/Behavioral: The patient is nervous/anxious.          Objective:     Vital Signs (Most Recent):  Temp: 97.7 °F (36.5 °C) (12/11/19 1105)  Pulse: 99 (12/11/19 1400)  Resp: (!) 24 (12/11/19 1400)  BP: 101/73 (12/11/19 1400)  SpO2: 99 % (12/11/19 1400) Vital Signs (24h Range):  Temp:  [97 °F (36.1 °C)-98.1 °F (36.7 °C)] 97.7 °F (36.5 °C)  Pulse:  [] 99  Resp:  [12-24] 24  SpO2:  [97 %-100 %] 99 %  BP: ()/(43-78) 101/73     Weight: 73.3 kg (161 lb 9.6 oz)  Body mass index is 26.89 kg/m².      Intake/Output Summary (Last 24 hours) at 12/11/2019 1452  Last data filed at 12/11/2019 1252  Gross per 24 hour   Intake 456.21 ml   Output 1770 ml   Net -1313.79 ml       Physical Exam   Constitutional: He is oriented to person, place, and time. He appears ill. Nasal cannula in place.   HENT:   Head: Atraumatic.   Eyes: Pupils are equal, round, and reactive to light. Conjunctivae are normal.   Neck: No tracheal deviation present.   Cardiovascular: Normal rate and regular rhythm.   No murmur heard.  Pulses:       Radial pulses are 1+ on the right side, and 1+ on the left side.        Dorsalis pedis pulses are 1+ on the right side, and 1+ on the left side.   Pulmonary/Chest: Effort normal. He has decreased breath sounds. He has no wheezes. He has no rales.   Abdominal: Soft. Bowel sounds are normal.   Musculoskeletal: He exhibits no edema.   Neurological: He is alert and oriented to person, place, and time.   Skin: Skin is dry. Capillary refill takes less than 2 seconds. No cyanosis.      "       Vents:  Oxygen Concentration (%): 50 (12/11/19 0538)    Lines/Drains/Airways     Peripherally Inserted Central Catheter Line                 PICC Double Lumen 12/09/19 0820 right brachial 2 days          Peripheral Intravenous Line                 Peripheral IV - Single Lumen 12/05/19 2032 18 G Right Antecubital 5 days                Significant Labs:    CBC/Anemia Profile:  Recent Labs   Lab 12/10/19  0441   WBC 10.30   HGB 13.8*   HCT 41.8      MCV 96   RDW 12.9        Chemistries:  Recent Labs   Lab 12/10/19  0441 12/11/19  0332   * 136   K 3.9 3.3*   CL 94* 94*   CO2 30* 32*   BUN 53* 45*   CREATININE 1.5* 1.3   CALCIUM 9.0 8.9   ALBUMIN 2.7*  --    PROT 6.5  --    BILITOT 0.9  --    ALKPHOS 71  --    ALT 42  --    AST 42*  --    MG 2.8*  --    PHOS 3.4  --        All pertinent labs within the past 24 hours have been reviewed.    Significant Imaging:  I have reviewed all pertinent imaging results/findings within the past 24 hours.

## 2019-12-11 NOTE — PLAN OF CARE
Patient had intermittent episodes of afib, and one episode of mild chest pain after eating ice cream and resolved shortly after being placed back on Bipap.  Patient has continued with good urine output (50 ml/hr), and maintained his blood pressure.  No acute changes in condition noted. Patient denies any complaints at this time.

## 2019-12-11 NOTE — PLAN OF CARE
Informed patient of observation status , advised patient status may change per provider if needed . Patient understand ,signed , and received written notification . Informed patient of financial services contact number [phone] if needed. No further action taken

## 2019-12-11 NOTE — ASSESSMENT & PLAN NOTE
73 y/o male woth PMHx for CADHx of PCI, HTN, HLP presented to [facility] ER with anterior MI and tx with TNK. Pt transferred to [facility]. Pt initially reperfused clinically but later on with CP.  Pt taken to the cath lab for postinfarct angina.    12/6/19  -s/p PCI of LAD per Dr. Alcazar  -ECHO pending  -Continue ASA, Statin, BB, ACEi, Plavix  -Check FLP  -Keep in ICU for today  -Dash diet, 2 gm sodium restriction  -1.5L Fluid restriction  -Repeat labs in AM    12/7/19  -Echo revealed EF 15-20%, +WMA's, PHTN, DD  -Add Tridil gtt today  -Stop ACEi today given need for Tridil gtt for symptom management   -Continue ASA, Statin, Plavix, BB, IV lasix BID, Tridil gtt  -Further recs to follow pending clinical course    12/8/19  -Continue ASA, Statin, Plavix, IV lasix BID, IV lopressor q6H  -Add ramipril if BP can tolerate  -keep in ICU for close monitoring given clinical condition with little/no improvement    12/9/19  -No chest pain overnight, SOB slowly improving  -Continue ASA, statin, Plavix, IV Lasix, IV lopressor  -Hold ACEi if needed given borderline BP  -Keep in ICU    12/10/19  -Slowly progressing post MI  -Continue ASA, statin, Plavix  -Continue other meds as BP permits  -Will further optimize as clinical condition permits  -Will consider low dose dobutamine, if needed but would like to try to avoid if possible given risk of recurrent aflutter/arrhythmias     12/11/19  -Continues to progress   -Continue ASA, statin, Plavix  -Continue BB and ACEi as BP permits  -Ranexa 500 mg BID added  -OOB and ambulate today if possible

## 2019-12-11 NOTE — ASSESSMENT & PLAN NOTE
-EF 15-20%, +WMA, PHTN  -Continue IV lasix BID, IV lopressor Q6H  -Use Ramipril if BP can tolerate  -Dash diet, 2 gm sodium restriction  -1200 ml fluid restriction  -Daily weights  -Strict intake and output  -Avoid exertional activities  -CXR stable  -Reassess in AM    12/9/19  -Slowly improving  -Continue IV diuresis  -Continue BB and ACEi as BP permits  -Optimization of medical therapy challenging given hypotension     12/10/19  -Appears slightly improved today  -BP marginal  -Hold BB, ACEi, and Lasix for now  -Will further optimize medical therapy as clinical condition permits  -Will consider low dose dobutamine, if needed  -LifeVest upon d/c home for SCD prevention    12/11/19  -Slowly progressing  -Decrease IV Lasix to 40 mg daily  -Continue BB and ACEi as BP permits  -Add Ranexa 500 mg BID  -Will further optimize regimen as clinical condition permits

## 2019-12-11 NOTE — PROGRESS NOTES
"[facility] -   Critical Care Medicine  Progress Note    Patient Name: Raleigh Walsh  MRN: [id number]  Admission Date: 12/5/2019  Hospital Length of Stay: 6 days  Code Status: No Order  Attending Provider: Gilmar Florence MD  Primary Care Provider: Akhil Smith MD   Principal Problem: STEMI (ST elevation myocardial infarction)    Subjective:     HPI:  74 year old male with PMH including CAD s/p MI with stent to LAD; HTN; HLD; depression; insomnia; reports YESENIA diagnosis and has CPAP but has not routinely used  Presented to [facility] ED with CP and EKG showed STEMI  tnkase given at 2046 and he was urgently transferred here for evaluation by cardiology  Taken for [facility] after arrival at this facility  PCI of LAD stent with balloon + new LAD stent perfromed    Hospital/ICU Course:  Admitted to ICU overnight post [facility]  Intermittent complaints of chest discomfort along with occasional reperfusion ectopy on cardiac monitor  Intermittent nausea/vomiting throughout today  12/7 - tachycardia, anxiety reported overnight; this am tachycardia, chest pressure, SOB with transition off nocturnal CPAP to NC; troponin remains > 50k  12/8 - less pronounced dyspnea and tachycardia today; Dr Andres reports no decline on repeat echo last evening; afebrile; diuresed 1.5L  12/9 - overnight ST converted to atrial flutter with RVR, amiodarone and heparin infusions initiated; BP remains marginal; intermittent dyspnea with some improvement  12/10 - SR on monitor with amiodarone and heparin infusions; continued intermittent SOB, chest tightness; 12 Lead EKG reveals no changes; BP remains soft, lopressor held overnight  12/11 - overnight intermittent atrial fib with RVR did not require intervention, remains on heparin infusion; one episode of mild chest pain, self resolved    Review of Systems   Constitutional: Negative for chills and fever.        Overall "feel better each day"   HENT: Negative for nosebleeds and tinnitus.  "   Respiratory: Positive for shortness of breath.    Cardiovascular: Positive for chest pain and palpitations.   Gastrointestinal: Negative.    Genitourinary: Negative.    Musculoskeletal: Negative.    Skin:        Wants to shave face; discussed electric razor   Psychiatric/Behavioral: The patient is nervous/anxious.          Objective:     Vital Signs (Most Recent):  Temp: 97.7 °F (36.5 °C) (12/11/19 1105)  Pulse: 99 (12/11/19 1400)  Resp: (!) 24 (12/11/19 1400)  BP: 101/73 (12/11/19 1400)  SpO2: 99 % (12/11/19 1400) Vital Signs (24h Range):  Temp:  [97 °F (36.1 °C)-98.1 °F (36.7 °C)] 97.7 °F (36.5 °C)  Pulse:  [] 99  Resp:  [12-24] 24  SpO2:  [97 %-100 %] 99 %  BP: ()/(43-78) 101/73     Weight: 73.3 kg (161 lb 9.6 oz)  Body mass index is 26.89 kg/m².      Intake/Output Summary (Last 24 hours) at 12/11/2019 1452  Last data filed at 12/11/2019 1252  Gross per 24 hour   Intake 456.21 ml   Output 1770 ml   Net -1313.79 ml       Physical Exam   Constitutional: He is oriented to person, place, and time. He appears ill. Nasal cannula in place.   HENT:   Head: Atraumatic.   Eyes: Pupils are equal, round, and reactive to light. Conjunctivae are normal.   Neck: No tracheal deviation present.   Cardiovascular: Normal rate and regular rhythm.   No murmur heard.  Pulses:       Radial pulses are 1+ on the right side, and 1+ on the left side.        Dorsalis pedis pulses are 1+ on the right side, and 1+ on the left side.   Pulmonary/Chest: Effort normal. He has decreased breath sounds. He has no wheezes. He has no rales.   Abdominal: Soft. Bowel sounds are normal.   Musculoskeletal: He exhibits no edema.   Neurological: He is alert and oriented to person, place, and time.   Skin: Skin is dry. Capillary refill takes less than 2 seconds. No cyanosis.            Vents:  Oxygen Concentration (%): 50 (12/11/19 0538)    Lines/Drains/Airways     Peripherally Inserted Central Catheter Line                 PICC Double Lumen  12/09/19 0820 right brachial 2 days          Peripheral Intravenous Line                 Peripheral IV - Single Lumen 12/05/19 2032 18 G Right Antecubital 5 days                Significant Labs:    CBC/Anemia Profile:  Recent Labs   Lab 12/10/19  0441   WBC 10.30   HGB 13.8*   HCT 41.8      MCV 96   RDW 12.9        Chemistries:  Recent Labs   Lab 12/10/19  0441 12/11/19  0332   * 136   K 3.9 3.3*   CL 94* 94*   CO2 30* 32*   BUN 53* 45*   CREATININE 1.5* 1.3   CALCIUM 9.0 8.9   ALBUMIN 2.7*  --    PROT 6.5  --    BILITOT 0.9  --    ALKPHOS 71  --    ALT 42  --    AST 42*  --    MG 2.8*  --    PHOS 3.4  --        All pertinent labs within the past 24 hours have been reviewed.    Significant Imaging:  I have reviewed all pertinent imaging results/findings within the past 24 hours.      ABG  No results for input(s): PH, PO2, PCO2, HCO3, BE in the last 168 hours.  Assessment/Plan:     Cardiac/Vascular  * STEMI (ST elevation myocardial infarction)  Post PCI  Echo shows severely reduced EF 15%, diastolic dysfunction, and elevated PA pressure  On ASA, plavix, statin, BB and ACEi  Concern for intermittent distress, continued troponin elevation, and EKG findings; discussed with cardiology - additional IV BB given; tridil infusion trial failed with hypotension; may need more aggressive support  Continue close ICU hemodynamic monitoring  12/8 - no significant changes overnight; continue BB, ASA, statin, ACEi; continue ICU hemodynamic monitoring  12/9 - continue BB, ASA, statin, ICU hemodynamic monitoring per cards  12/10 - overall status remains marginal; discussed with cards, will mobilize today and if BP decline may start low dose dobutamine  12/11 - up in chair, looks good and feels well; brief overnight mild chest pain seems related to orthopnea but he wonders if related to food, continue medical optimization, if continues to feel food related, would consider esophagram    Atrial flutter, New- onset    Amiodarone infusion to enteral today  Continue anticoagulation with heparin infusion; brief a flutter, may not need long term anticoagulation, re-eval in am  12/11 intermittent a fib overnight; on amiodarone and heparin, cards to decide if anticoagulation needed on discharge    Paroxysmal VT  Decreased ectopy and short runs of v tach through night; none seen to now this morning  Continue BB  12/8 - ICU hemodynamic monitoring  12/9 - no ventricular ectopy seen today    Renal/  PENNY (acute kidney injury)  Likely s/t cardiogenic shock; monitor trend  Creatinine holding  If BP worsens will start low dose dobutamine  12/11 - resolved, monitor creatinine    Oncology  Leukocytosis, likely reactive   Suspect reactive  Encourage IS and mobilize  resolved    Other  YESENIA (obstructive sleep apnea)  Nocturnal BiPAP ordered       Critical Care Daily Checklist:    A: Awake: RASS Goal/Actual Goal: RASS Goal: 0-->alert and calm  Actual: Musa Agitation Sedation Scale (RASS): Alert and calm   B: Spontaneous Breathing Trial Performed?     C: SAT & SBT Coordinated?  n/a                      D: Delirium: CAM-ICU Overall CAM-ICU: Negative   E: Early Mobility Performed? Yes   F: Feeding Goal:    Status:     Current Diet Order   Procedures    Diet Cardiac     Fluid restriction 1500 ml/day      AS: Analgesia/Sedation prn   T: Thromboembolic Prophylaxis heparin   H: HOB > 300 Yes   U: Stress Ulcer Prophylaxis (if needed) pepcid   G: Glucose Control monitor   B: Bowel Function Stool Occurrence: 1   I: Indwelling Catheter (Lines & New) Necessity reviewed   D: De-escalation of Antimicrobials/Pharmacotherapies reviewed    Plan for the day/ETD Cardiac monitoring, mobilization, transfer out of ICU to telemetry      Family/Goals of Care:   Home on discharge   I have discussed case and plan of care in detail with Dr Faust and Dr Florence and Shawna PA-C cardiology; Status and plan of care were discussed with team on multidisciplinary  rounds.  We will transfer out to telemetry. We will sign off on transfer, please call if pulmonary status worsens or we can be of any additional assistance.      MARIELENA Pa-BC  Critical Care Medicine  [facility] [place]

## 2019-12-11 NOTE — PROGRESS NOTES
[facility] -   Cardiology  Progress Note    Patient Name: Raleigh Walsh  MRN: [id number]  Admission Date: 12/5/2019  Hospital Length of Stay: 6 days  Code Status: No Order   Attending Physician: Gilmar Florence MD   Primary Care Physician: Akhil Smith MD  Expected Discharge Date:   Principal Problem:STEMI (ST elevation myocardial infarction)    Subjective:   HPI:  75 y/o male woth PMHx for CADHx of PCI, HTN, HLP presented to [facility] ER with anterior MI and tx with TNK. [facility] Pt initially reperfused clinically but later on with CP.  Pt taken to the cath lab for postinfarct angina.    Hospital Course:   12/6/19--Patient seen and examined in room, lying in bed. Continues to complain of intermittent chest discomfort today but much improved since PCI. He was noted to have 8-10 beats of VT on monitor at time of exam this AM. Keep in ICU for now. Labs reviewed, K+ 4.8, Cr 1.4, Troponin >50.     12/7/19--Patient seen and examined in ICU, lying in bed. Had multiple episodes of shortness of breath, palpitations with diaphoresis noted o/n. HR at time of exam 110s. Will optimize medical regimen for CAD/CHF today. Discussed with patient and family at bedside given guarded prognosis given ICM post STEMI. Will add Tridil gtt today and reassess response.     12/8/19--Patient seen and examined in ICU today. Reports continued shortness of breath today but slightly/marginally improved from yesterday. HR remains elevated today in 110-120s, EKG revealed ST with PACs today. Patient unable to tolerate Tridil gtt yesterday due to hypotension. Will try to optimize medical regimen today but low BP and elevated HR makes medical therapy challenging. Labs reviewed, K+ 4.3, Cr 1.7, BUN 42. Repeat CXR ordered.     12/9/19-Patient seen and examined today. Still SOB and fatigued, some improvement overnight. Denies chest pain. Converted to aflutter this AM, amiodarone drip initiated without bolus. Labs  reviewed. Creatinine 1.5, liver enzymes improving. Troponin trending down. BP remains marginal.     12/10/19-Patient seen and examined today. Appears slightly stronger, more talkative. Still endorses SOB. No sharan chest pain or tightness. Converted to SR overnight. Labs reviewed. Creatinine stable.     12/11/19-Patient seen and examined today, sitting up in chair. Slowly progressing. SOB improved. Did admit to some mild chest tightness overnight, has since resolved. Intermittent afib noted as well. Labs reviewed, K slightly low at 3.3. BP stable.         Review of Systems   Constitution: Positive for malaise/fatigue.   Eyes: Negative.    Cardiovascular: Positive for dyspnea on exertion.   Respiratory: Positive for shortness of breath.    Endocrine: Negative.    Hematologic/Lymphatic: Negative.    Skin: Negative.    Musculoskeletal: Positive for arthritis and joint pain.   Gastrointestinal: Negative.    Genitourinary: Negative.    Neurological: Positive for weakness.   Psychiatric/Behavioral: Negative.    Allergic/Immunologic: Negative.      Objective:     Vital Signs (Most Recent):  Temp: 98.1 °F (36.7 °C) (12/11/19 0705)  Pulse: 95 (12/11/19 1000)  Resp: (!) 23 (12/11/19 1000)  BP: (!) 86/65 (12/11/19 1000)  SpO2: 99 % (12/11/19 1000) Vital Signs (24h Range):  Temp:  [97 °F (36.1 °C)-98.1 °F (36.7 °C)] 98.1 °F (36.7 °C)  Pulse:  [] 95  Resp:  [12-24] 23  SpO2:  [81 %-100 %] 99 %  BP: ()/(57-78) 86/65     Weight: 73.3 kg (161 lb 9.6 oz)  Body mass index is 26.89 kg/m².     SpO2: 99 %  O2 Device (Oxygen Therapy): BiPAP      Intake/Output Summary (Last 24 hours) at 12/11/2019 1139  Last data filed at 12/11/2019 1000  Gross per 24 hour   Intake 662.01 ml   Output 1725 ml   Net -1062.99 ml       Lines/Drains/Airways     Peripherally Inserted Central Catheter Line                 PICC Double Lumen 12/09/19 0820 right brachial 2 days          Drain                 Urethral Catheter 12/07/19 1000 Latex 4 days           Peripheral Intravenous Line                 Peripheral IV - Single Lumen 12/05/19 2032 18 G Right Antecubital 5 days                Physical Exam   Constitutional: He is oriented to person, place, and time. He appears well-developed and well-nourished. No distress.   On supplemental O2   HENT:   Head: Normocephalic and atraumatic.   Eyes: Pupils are equal, round, and reactive to light. Right eye exhibits no discharge. Left eye exhibits no discharge.   Neck: Neck supple. No JVD present.   Cardiovascular: Normal rate, regular rhythm, S1 normal, S2 normal and normal heart sounds.   No murmur heard.  Pulmonary/Chest: Effort normal. No respiratory distress. He has no wheezes.   Diminished BS at bases   Abdominal: Soft. He exhibits no distension.   Musculoskeletal: He exhibits no edema.   Neurological: He is alert and oriented to person, place, and time.   Skin: Skin is warm and dry. He is not diaphoretic. No erythema.   Groin access site C/D/I; no bleeding erythema or drainage   Psychiatric: He has a normal mood and affect. His behavior is normal. Thought content normal.   Nursing note and vitals reviewed.      Significant Labs:   CMP   Recent Labs   Lab 12/10/19  0441 12/11/19  0332   * 136   K 3.9 3.3*   CL 94* 94*   CO2 30* 32*   * 104   BUN 53* 45*   CREATININE 1.5* 1.3   CALCIUM 9.0 8.9   PROT 6.5  --    ALBUMIN 2.7*  --    BILITOT 0.9  --    ALKPHOS 71  --    AST 42*  --    ALT 42  --    ANIONGAP 11 10   ESTGFRAFRICA 53* >60   EGFRNONAA 46* 54*   , CBC   Recent Labs   Lab 12/10/19  0441   WBC 10.30   HGB 13.8*   HCT 41.8      , Troponin No results for input(s): TROPONINI in the last 48 hours. and All pertinent lab results from the last 24 hours have been reviewed.    Significant Imaging: Echocardiogram:   2D echo with color flow doppler:   Results for orders placed or performed during the hospital encounter of 12/05/19   2D echo with color flow doppler   Result Value Ref Range    QEF 25  (A) 55 - 65    Pericardial Effusion SMALL (A)     Narrative    Date of Procedure: 12/07/2019        TEST DESCRIPTION   Technical Quality: This is a technically adequate study.     Aorta: The aortic root is normal in size.     Left Atrium: The left atrium is normal in size.     Left Ventricle: The left ventricle is normal in size. LV wall thickness is normal. The following segments were akinetic: apical septum, apical lateral wall, apical inferior wall, apical anterior wall.  The following segments were mildly hypokinetic: basal anterior wall.  The following segments were severely hypokinetic: mid inferoseptum, mid anterior wall.  Left ventricular systolic function appears severely depressed. Visually estimated ejection fraction is 25-30%.     Diastolic indices:     Right Atrium: The right atrium is normal in size.     Right Ventricle: The right ventricle is normal in size. Global right ventricular systolic function appears normal.     Aortic Valve:  The aortic valve is normal in structure.     Mitral Valve:  The mitral valve is normal in structure.     Pulmonary Valve:  The pulmonic valve is not well seen.     Pericardium: There is evidence of a small postero-lateral pericardial effusion.     IVC: IVC is enlarged but collapses > 50% with a sniff, suggesting intermediate right atrial pressure of 8 mmHg.     Intracavitary: There is no evidence of intracavity mass, thrombi, or vegetation.         CONCLUSIONS     1 - Wall motion abnormalities.     2 - Severely depressed left ventricular systolic function (EF 25-30%).     3 - Normal right ventricular systolic function .     4 - Focal small pericardial effusion on right side. No tamponade    5 - Intermediate central venous pressure.     6 - Limited echo study to rule out mechanical rupture.            This document has been electronically    SIGNED BY: Lamont Andres MD On: 12/08/2019 10:28   , EKG: Reviewed and X-Ray: CXR: X-Ray Chest 1 View (CXR):   Results for orders  placed or performed during the hospital encounter of 12/05/19   X-Ray Chest 1 View    Narrative    EXAMINATION:  XR CHEST 1 VIEW    CLINICAL HISTORY:  PICC placement;    TECHNIQUE:  Single frontal view of the chest was performed.    COMPARISON:  Chest radiograph 12/08/2019 with priors    FINDINGS:  Cardiac leads project over the chest.  Interval placement of a right-sided peripherally inserted central venous catheter with tip projecting in the SVC.  Cardiomediastinal silhouette is within normal limits and stable.  Persistent interstitial infiltrates.  No large consolidative opacity, effusion or pneumothorax.  Osseous structures appear intact.      Impression    Interval placement of a right-sided PICC with tip projecting in the SVC.    Stable bilateral interstitial infiltrates.      Electronically signed by: Henry Martin  Date:    12/09/2019  Time:    09:17    and X-Ray Chest PA and Lateral (CXR): No results found for this visit on 12/05/19.    Assessment and Plan:   Patient who presents with STEMI/ICM. Recovering slowly. Meds adjusted/optimized. Keep in ICU today.    * STEMI (ST elevation myocardial infarction)  75 y/o male woth PMHx for CADHx of PCI, HTN, HLP presented to [facility] ER with anterior MI and tx with TNK. Pt transferred to [facility]. Pt initially reperfused clinically but later on with CP.  Pt taken to the cath lab for postinfarct angina.    12/6/19  -s/p PCI of LAD per Dr. Alcazar  -ECHO pending  -Continue ASA, Statin, BB, ACEi, Plavix  -Check FLP  -Keep in ICU for today  -Dash diet, 2 gm sodium restriction  -1.5L Fluid restriction  -Repeat labs in AM    12/7/19  -Echo revealed EF 15-20%, +WMA's, PHTN, DD  -Add Tridil gtt today  -Stop ACEi today given need for Tridil gtt for symptom management   -Continue ASA, Statin, Plavix, BB, IV lasix BID, Tridil gtt  -Further recs to follow pending clinical course    12/8/19  -Continue ASA, Statin, Plavix, IV lasix BID, IV lopressor q6H  -Add ramipril if BP can  tolerate  -keep in ICU for close monitoring given clinical condition with little/no improvement    12/9/19  -No chest pain overnight, SOB slowly improving  -Continue ASA, statin, Plavix, IV Lasix, IV lopressor  -Hold ACEi if needed given borderline BP  -Keep in ICU    12/10/19  -Slowly progressing post MI  -Continue ASA, statin, Plavix  -Continue other meds as BP permits  -Will further optimize as clinical condition permits  -Will consider low dose dobutamine, if needed but would like to try to avoid if possible given risk of recurrent aflutter/arrhythmias     12/11/19  -Continues to progress   -Continue ASA, statin, Plavix  -Continue BB and ACEi as BP permits  -Ranexa 500 mg BID added  -OOB and ambulate today if possible    Atrial flutter, New- onset   -HR controlled at time of exam  -Continue amio gtt  -Continue BB as BP permits  -Continue heparin gtt    12/10/19  -Converted to SR this AM  -Switch amiodarone gtt to po  -No BB given borderline BP  -Continue heparin gtt for now; may not need long term AC given brief duration of arrhythmia; will re-evaluate    12/11/19  -Intermittent afib noted overnight    Leukocytosis, likely reactive   -Mgmt per primary team    PENNY (acute kidney injury)  -Creatinine stable, monitor  -Repeat BMP in AM    Acute systolic congestive heart failure, NYHA class 3  -EF 15-20%, +WMA, PHTN  -Continue IV lasix BID, IV lopressor Q6H  -Use Ramipril if BP can tolerate  -Dash diet, 2 gm sodium restriction  -1200 ml fluid restriction  -Daily weights  -Strict intake and output  -Avoid exertional activities  -CXR stable  -Reassess in AM    12/9/19  -Slowly improving  -Continue IV diuresis  -Continue BB and ACEi as BP permits  -Optimization of medical therapy challenging given hypotension     12/10/19  -Appears slightly improved today  -BP marginal  -Hold BB, ACEi, and Lasix for now  -Will further optimize medical therapy as clinical condition permits  -Will consider low dose dobutamine, if  needed  -LifeVest upon d/c home for SCD prevention    12/11/19  -Slowly progressing  -Decrease IV Lasix to 40 mg daily  -Continue BB and ACEi as BP permits  -Add Ranexa 500 mg BID  -Will further optimize regimen as clinical condition permits    YESENIA (obstructive sleep apnea)  -continue nightly cpap    Paroxysmal VT  Continue BB  Keep K+ >4 and Mag >2  Continue telemetry monitoring  Keep in ICU for now    12/7  -continue BB    12/8  -keep K+>4 and Mag>2  -Continue BB as tolerated    12/9/19  -Keep electrolytes WNL  -Continue BB as BP permits    12/10/19  -See plan above  -LifeVest for SCD prevention    Mixed hyperlipidemia  -Continue statin  -Check FLP        VTE Risk Mitigation (From admission, onward)         Ordered     apixaban tablet 5 mg  2 times daily      12/11/19 3797                JUNITO CrowC  Cardiology  [facility]

## 2019-12-11 NOTE — ASSESSMENT & PLAN NOTE
Amiodarone infusion to enteral today  Continue anticoagulation with heparin infusion; brief a flutter, may not need long term anticoagulation, re-eval in am  12/11 intermittent a fib overnight; on amiodarone and heparin, cards to decide if anticoagulation needed on discharge

## 2019-12-11 NOTE — PT/OT/SLP EVAL
Physical Therapy Evaluation    Patient Name:  Raleigh Walsh   MRN:  [id number]    Recommendations:     Discharge Recommendations:  home health PT, home(with wife; HHPT need pending progress)   Discharge Equipment Recommendations: (probably nothing - tbd before d/c)   Barriers to discharge: None    Assessment:     Raleigh Walsh is a 74 y.o. male admitted with a medical diagnosis of STEMI (ST elevation myocardial infarction).  He presents with the following impairments/functional limitations:  weakness, impaired functional mobilty, impaired endurance, gait instability, impaired balance, impaired self care skills.    Rehab Prognosis: Good; patient would benefit from acute skilled PT services to address these deficits and reach maximum level of function.    Recent Surgery: Procedure(s) (LRB):  CATHETERIZATION, HEART, LEFT (Left) 6 Days Post-Op    Plan:     During this hospitalization, patient to be seen 5 x/week to address the identified rehab impairments via gait training, therapeutic activities, therapeutic exercises and progress toward the following goals:    · Plan of Care Expires:  12/17/19    Subjective     Chief Complaint: weakness/fatigue; just starting to move around oob today  Patient/Family Comments/goals: to get stronger; return to plof and go home  Pain/Comfort:  ·      Patients cultural, spiritual, Jewish conflicts given the current situation:      Living Environment:  Lives with wife in one story home; no steps to enter home  Prior to admission, patients level of function was indep/retired.  Equipment used at home: none.  DME owned (not currently used): none.  Upon discharge, patient will have assistance from wife.    Objective:     Communicated with LAVON Villegas prior to session.  Patient found up in chair with blood pressure cuff, mcfarland catheter, oxygen, peripheral IV, pulse ox (continuous), telemetry  upon PT entry to room.    General Precautions: Standard, fall, respiratory   Orthopedic  Precautions:N/A   Braces:       Exams:  · B LE ROM WFL and strength grossly 3+/5 to 4-/5    Functional Mobility:  · Bed Mobility - sit to supine cga for controlled descent and cues for technique with bed rail use  · Transfers - sit to/from stand min/cga for balance - no aD  · Gt - Amb 5ft from bedside chair to bed with min A and no AD; slightly flexed posture      Therapeutic Activities and Exercises:   PT educated patient on POC, B LE TE to do in prep for mobility and safety/fall/lines & tubes precautions with mobility.     AM-PAC 6 CLICK MOBILITY  Total Score:      Patient left HOB elevated with all lines intact, call button in reach and RN notified.    GOALS:   Multidisciplinary Problems     Physical Therapy Goals        Problem: Physical Therapy Goal    Goal Priority Disciplines Outcome Goal Variances Interventions   Physical Therapy Goal     PT, PT/OT      Description:  stg's to be met by 12/17/19  1. Patient will perform supine to/from sit mod indep  2. Patient will perform sit to/from stand mod indep no AD  3. Patient will ambulate 300ft least AD spv or less no AD                    History:     Past Medical History:   Diagnosis Date    Angina pectoris     Depression     Hyperlipidemia     Hypertension     Insomnia     MI (myocardial infarction) 2009    YESENIA on CPAP        Past Surgical History:   Procedure Laterality Date    CERVICAL FUSION      CORONARY ANGIOPLASTY WITH STENT PLACEMENT  2009       Time Tracking:     PT Received On: 12/10/19  PT Start Time: 1225     PT Stop Time: 1250  PT Total Time (min): 25 min     Billable Minutes: Evaluation 15 and Therapeutic Activity 10      Bertram Pink, PT  12/11/2019

## 2019-12-12 NOTE — PT/OT/SLP PROGRESS
Physical Therapy  Treatment    Raleigh Walsh   MRN: [id number]   Admitting Diagnosis: STEMI (ST elevation myocardial infarction)    PT Received On: 12/12/19  PT Start Time: 0835     PT Stop Time: 0900    PT Total Time (min): 25 min       Billable Minutes:  Gait Training 15 and Therapeutic Activity 10    Treatment Type: Treatment  PT/PTA: PT         General Precautions: Standard, fall, respiratory  Orthopedic Precautions: N/A   Braces: N/A    Subjective:  Communicated with NURSE MOONEY prior to session.  Pain/Comfort  Pain Rating 1: 0/10    Objective:   Patient found with: blood pressure cuff, pulse ox (continuous), telemetry, peripheral IV, oxygen    Functional Mobility:  Therapeutic Activities and Exercises:  PT FOUND SEATED ON TOILET IN ROOM, JUST FINISHING TOILETING, AIDE PRESENT TO ASSIST IN CLEANING, CGA FOR TOILET TF, PT ' NO AD WITH CGA, SLOW PACED GAIT, MINIMAL SOB WITH O2 IN TOW, PT RETURN TO ROOM TO BEDSIDE CHAIR, PT EDUCATED IN BLE THEREX TO PERFORM WHILE SEATED IN CHAIR    AM-PAC 6 CLICK MOBILITY  How much help from another person does this patient currently need?   1 = Unable, Total/Dependent Assistance  2 = A lot, Maximum/Moderate Assistance  3 = A little, Minimum/Contact Guard/Supervision  4 = None, Modified [place]/Independent    Turning over in bed (including adjusting bedclothes, sheets and blankets)?: 4  Sitting down on and standing up from a chair with arms (e.g., wheelchair, bedside commode, etc.): 3  Moving from lying on back to sitting on the side of the bed?: 3  Moving to and from a bed to a chair (including a wheelchair)?: 3  Need to walk in hospital room?: 3  Climbing 3-5 steps with a railing?: 1  Basic Mobility Total Score: 17    AM-PAC Raw Score CMS G-Code Modifier Level of Impairment Assistance   6 % Total / Unable   7 - 9 CM 80 - 100% Maximal Assist   10 - 14 CL 60 - 80% Moderate Assist   15 - 19 CK 40 - 60% Moderate Assist   20 - 22 CJ 20 - 40% Minimal Assist    23 CI 1-20% SBA / CGA   24 CH 0% Independent/ Mod I     Patient left up in chair with all lines intact, call button in reach and NURSE notified.    Assessment:  Raleigh Walsh is a 74 y.o. male with a medical diagnosis of STEMI (ST elevation myocardial infarction) and presents with IMPAIRED FUNCTIONAL MOBILITY. PT WILL BENEFIT FROM CONT. SKILLED P.T. TO ADDRESS IMPAIRMENTS    Rehab identified problem list/impairments: Rehab identified problem list/impairments: weakness, impaired endurance, impaired functional mobilty, gait instability, impaired balance, decreased coordination, decreased safety awareness    Rehab potential is good.    Activity tolerance: Good    Discharge recommendations: Discharge Facility/Level of Care Needs: home health PT     Barriers to discharge:      Equipment recommendations: Equipment Needed After Discharge: none     GOALS:   Multidisciplinary Problems     Physical Therapy Goals        Problem: Physical Therapy Goal    Goal Priority Disciplines Outcome Goal Variances Interventions   Physical Therapy Goal     PT, PT/OT Ongoing, Progressing     Description:  stg's to be met by 12/17/19  1. Patient will perform supine to/from sit mod indep  2. Patient will perform sit to/from stand mod indep no AD  3. Patient will ambulate 300ft least AD spv or less no AD                    PLAN:    Patient to be seen 5 x/week  to address the above listed problems via gait training, therapeutic activities, therapeutic exercises  Plan of Care expires: 12/17/19  Plan of Care reviewed with: patient, spouse      Yaima Dwain, PT  12/12/2019

## 2019-12-12 NOTE — ASSESSMENT & PLAN NOTE
12/9/19  - New onset Atrial flutter  Post MI/ Post PCI   -Started on Amiodarone ggt and Heparin ggt  -Continue BB  -Continue ACEI  12/10/19-  Converted to sinus rhythm   Switched to oral Amiodarone   Remains on heparin ggt   12/11/19-  Started on NOAC  On Amiodarone oral   BB

## 2019-12-12 NOTE — ASSESSMENT & PLAN NOTE
- New onset Post -MI   -Continue Diuresis   -Continue BB  -ACEI is discontinued by Cardiology due to hypotension or marginal BP   -Close monitoring in ICU   12/8/19-  Good diuresis is noted with IV Furosemide   Started back on ACEI   Continue BB  12/9/19-  Continue diuresis , BB, ACEI   12/10/19-  Clinically pt seems to be compensating   Continue diuresis .  BB and ACEI as BP permits   12/11/19-  Continue Diuresis   BB and ACEI as BP permits   BP remains marginal   EF 25-30%. Life vest upon discharge

## 2019-12-12 NOTE — SUBJECTIVE & OBJECTIVE
Review of Systems   Constitution: Positive for malaise/fatigue.   Eyes: Negative.    Cardiovascular: Positive for dyspnea on exertion.   Respiratory: Positive for shortness of breath.    Hematologic/Lymphatic: Bruises/bleeds easily.   Skin: Negative.    Musculoskeletal: Negative.    Gastrointestinal: Negative.    Genitourinary: Negative.    Neurological: Positive for weakness.   Psychiatric/Behavioral: Negative.    Allergic/Immunologic: Negative.      Objective:     Vital Signs (Most Recent):  Temp: 97.9 °F (36.6 °C) (12/12/19 0705)  Pulse: 87 (12/12/19 0705)  Resp: 17 (12/12/19 0705)  BP: 93/70 (12/12/19 0705)  SpO2: 100 % (12/12/19 0705) Vital Signs (24h Range):  Temp:  [96.4 °F (35.8 °C)-98.4 °F (36.9 °C)] 97.9 °F (36.6 °C)  Pulse:  [86-99] 87  Resp:  [17-24] 17  SpO2:  [90 %-100 %] 100 %  BP: ()/(43-74) 93/70     Weight: 73.3 kg (161 lb 9.6 oz)  Body mass index is 26.89 kg/m².     SpO2: 100 %  O2 Device (Oxygen Therapy): nasal cannula      Intake/Output Summary (Last 24 hours) at 12/12/2019 1132  Last data filed at 12/12/2019 0826  Gross per 24 hour   Intake 700 ml   Output 650 ml   Net 50 ml       Lines/Drains/Airways     Peripherally Inserted Central Catheter Line                 PICC Double Lumen 12/09/19 0820 right brachial 3 days          Peripheral Intravenous Line                 Peripheral IV - Single Lumen 12/05/19 2032 18 G Right Antecubital 6 days                Physical Exam   Constitutional: He is oriented to person, place, and time. He appears well-developed and well-nourished. No distress.   HENT:   Head: Normocephalic and atraumatic.   Eyes: Pupils are equal, round, and reactive to light. Right eye exhibits no discharge. Left eye exhibits no discharge.   Neck: Neck supple. No JVD present.   Cardiovascular: Normal rate, regular rhythm, S1 normal, S2 normal and normal heart sounds.   No murmur heard.  Pulmonary/Chest: Effort normal. No respiratory distress. He has no wheezes.   Diminished  BS at bases   Abdominal: Soft. He exhibits no distension. There is no tenderness.   Musculoskeletal: He exhibits no edema.   Neurological: He is alert and oriented to person, place, and time.   Skin: Skin is warm and dry. He is not diaphoretic. No erythema.   Right groin access site C/D/I; no bleeding erythema or drainage   Psychiatric: He has a normal mood and affect. His behavior is normal. Thought content normal.   Nursing note and vitals reviewed.      Significant Labs:   CMP   Recent Labs   Lab 12/11/19  0332 12/12/19 0416    134*   K 3.3* 4.3   CL 94* 94*   CO2 32* 29    109   BUN 45* 48*   CREATININE 1.3 1.3   CALCIUM 8.9 9.0   PROT  --  6.7   ALBUMIN  --  2.7*   BILITOT  --  1.4*   ALKPHOS  --  90   AST  --  52*   ALT  --  67*   ANIONGAP 10 11   ESTGFRAFRICA >60 >60   EGFRNONAA 54* 54*   , CBC   Recent Labs   Lab 12/12/19 0416   WBC 10.92   HGB 14.5   HCT 44.6      , Troponin   Recent Labs   Lab 12/12/19 0416   TROPONINI 12.839*    and All pertinent lab results from the last 24 hours have been reviewed.    Significant Imaging: Echocardiogram:   2D echo with color flow doppler:   Results for orders placed or performed during the hospital encounter of 12/05/19   2D echo with color flow doppler   Result Value Ref Range    QEF 25 (A) 55 - 65    Pericardial Effusion SMALL (A)     Narrative    Date of Procedure: 12/07/2019        TEST DESCRIPTION   Technical Quality: This is a technically adequate study.     Aorta: The aortic root is normal in size.     Left Atrium: The left atrium is normal in size.     Left Ventricle: The left ventricle is normal in size. LV wall thickness is normal. The following segments were akinetic: apical septum, apical lateral wall, apical inferior wall, apical anterior wall.  The following segments were mildly hypokinetic: basal anterior wall.  The following segments were severely hypokinetic: mid inferoseptum, mid anterior wall.  Left ventricular systolic function  appears severely depressed. Visually estimated ejection fraction is 25-30%.     Diastolic indices:     Right Atrium: The right atrium is normal in size.     Right Ventricle: The right ventricle is normal in size. Global right ventricular systolic function appears normal.     Aortic Valve:  The aortic valve is normal in structure.     Mitral Valve:  The mitral valve is normal in structure.     Pulmonary Valve:  The pulmonic valve is not well seen.     Pericardium: There is evidence of a small postero-lateral pericardial effusion.     IVC: IVC is enlarged but collapses > 50% with a sniff, suggesting intermediate right atrial pressure of 8 mmHg.     Intracavitary: There is no evidence of intracavity mass, thrombi, or vegetation.         CONCLUSIONS     1 - Wall motion abnormalities.     2 - Severely depressed left ventricular systolic function (EF 25-30%).     3 - Normal right ventricular systolic function .     4 - Focal small pericardial effusion on right side. No tamponade    5 - Intermediate central venous pressure.     6 - Limited echo study to rule out mechanical rupture.            This document has been electronically    SIGNED BY: Lamont Andres MD On: 12/08/2019 10:28   , EKG: Reviewed and X-Ray: CXR: X-Ray Chest 1 View (CXR):   Results for orders placed or performed during the hospital encounter of 12/05/19   X-Ray Chest 1 View    Narrative    EXAMINATION:  XR CHEST 1 VIEW    CLINICAL HISTORY:  PICC placement;    TECHNIQUE:  Single frontal view of the chest was performed.    COMPARISON:  Chest radiograph 12/08/2019 with priors    FINDINGS:  Cardiac leads project over the chest.  Interval placement of a right-sided peripherally inserted central venous catheter with tip projecting in the SVC.  Cardiomediastinal silhouette is within normal limits and stable.  Persistent interstitial infiltrates.  No large consolidative opacity, effusion or pneumothorax.  Osseous structures appear intact.      Impression    Interval  placement of a right-sided PICC with tip projecting in the SVC.    Stable bilateral interstitial infiltrates.      Electronically signed by: Henry Martin  Date:    12/09/2019  Time:    09:17    and X-Ray Chest PA and Lateral (CXR): No results found for this visit on 12/05/19.

## 2019-12-12 NOTE — SUBJECTIVE & OBJECTIVE
Interval History:   Remains on O2 at 4L/min.  Nightly BiPAP  Still c/o some SOB . Intermittent A.fib overnight and  had an episode of chest discomfort last night after eating ice cream  per nursing staff.  This morning pt denies chest pain but endorses SOB . Good diuresis is noted.   On Oral amiodarone . Started on NOAC by Cardiology today.  BB as BP permits   Lasix dose adjusted to IV daily per Cardiology   On Ranolazine   PT is seeing pt and is able to participate          Review of Systems   Constitutional: Positive for activity change and appetite change. Negative for fever.   HENT: Negative for sore throat.    Eyes: Negative for visual disturbance.   Respiratory: Positive for chest tightness (one episode yesterday ) and shortness of breath. Negative for cough.    Cardiovascular: Negative for chest pain, palpitations and leg swelling.   Gastrointestinal: Negative for abdominal distention, abdominal pain, constipation, diarrhea, nausea and vomiting.   Endocrine: Negative for polyuria.   Genitourinary: Negative for decreased urine volume, dysuria, flank pain, frequency and hematuria.   Musculoskeletal: Negative for back pain and gait problem.   Skin: Negative for rash.   Neurological: Negative for syncope, speech difficulty, weakness, light-headedness and headaches.   Psychiatric/Behavioral: Negative for confusion, hallucinations and sleep disturbance.     Objective:     Vital Signs (Most Recent):  Temp: 98.4 °F (36.9 °C) (12/11/19 1505)  Pulse: 88 (12/11/19 1700)  Resp: 19 (12/11/19 1700)  BP: (!) 84/59 (12/11/19 1700)  SpO2: 96 % (12/11/19 1700) Vital Signs (24h Range):  Temp:  [97 °F (36.1 °C)-98.4 °F (36.9 °C)] 98.4 °F (36.9 °C)  Pulse:  [] 88  Resp:  [12-24] 19  SpO2:  [96 %-100 %] 96 %  BP: ()/(43-78) 84/59     Weight: 73.3 kg (161 lb 9.6 oz)  Body mass index is 26.89 kg/m².    Intake/Output Summary (Last 24 hours) at 12/11/2019 1840  Last data filed at 12/11/2019 1700  Gross per 24 hour   Intake  1720.41 ml   Output 1490 ml   Net 230.41 ml      Physical Exam   Constitutional: He is oriented to person, place, and time. He appears well-developed and well-nourished. No distress.   HENT:   Head: Normocephalic and atraumatic.   Mouth/Throat: No oropharyngeal exudate.   Eyes: Pupils are equal, round, and reactive to light. Conjunctivae and EOM are normal.   Neck: Normal range of motion. Neck supple. No JVD present. No thyromegaly present.   Cardiovascular: Normal rate, regular rhythm and normal heart sounds.   No murmur heard.  Pulmonary/Chest: Effort normal and breath sounds normal. No respiratory distress. He has no wheezes. He has no rales. He exhibits no tenderness.   No wheezes , no rales    Abdominal: Soft. Bowel sounds are normal. He exhibits no distension. There is no tenderness. There is no rebound and no guarding.   Musculoskeletal: Normal range of motion. He exhibits no edema.   Lymphadenopathy:     He has no cervical adenopathy.   Neurological: He is alert and oriented to person, place, and time. He displays normal reflexes. No cranial nerve deficit or sensory deficit.   Skin: Skin is warm and dry. No rash noted. He is not diaphoretic.   Psychiatric: He has a normal mood and affect.       Significant Labs:   CBC:   Recent Labs   Lab 12/10/19  0441   WBC 10.30   HGB 13.8*   HCT 41.8        CMP:   Recent Labs   Lab 12/10/19  0441 12/11/19  0332   * 136   K 3.9 3.3*   CL 94* 94*   CO2 30* 32*   * 104   BUN 53* 45*   CREATININE 1.5* 1.3   CALCIUM 9.0 8.9   PROT 6.5  --    ALBUMIN 2.7*  --    BILITOT 0.9  --    ALKPHOS 71  --    AST 42*  --    ALT 42  --    ANIONGAP 11 10   EGFRNONAA 46* 54*       Significant Imaging:

## 2019-12-12 NOTE — PROGRESS NOTES
[facility] Hospital Medicine  Progress Note    Patient Name: Raleigh Walsh  MRN: [id number]  Patient Class: IP- Inpatient   Admission Date: 12/5/2019  Length of Stay: 7 days  Attending Physician: Gilmar Florence MD  Primary Care Provider: Akhil Smith MD        Subjective:     Principal Problem:STEMI (ST elevation myocardial infarction)        HPI:  75 YO WM with known CAD S/P MI and prior stenting;  Presented to ED at [facility] after experiencing chest pain while at a ballgame; He ruled in foe a STEMI; was treated with TPA and transferred here; He was taken to the cath lab where he underwent PCI with stent placed to the proximal LAD by Dr. Alcazar.  He was transferred to the ICU in stable condition.    Overview/Hospital Course:  12/7/19-  Pt c/o new onset SOB on minimal exertion . Denies chest pain.   Remains on O2 at 5L/min. Pt is noted to be tachycardic and tachypnic.   Episodes of nausea and emesis x 3 yesterday but none today   EKG revealed sinus tachycardia , ST elevation ant and lat leads and no longer PVC's   Echo revealed severely depressed left vent systolic function with EF of 15-20%, PA pressure 43.   Cardiology is recommending diuresis with IV furosemide , continue BB and start Tridil drip.   Prognosis is guarded at this time.   12/8/19-  Pt is seen at bedside . Remains mildly tachycardic and tachypnic .  Reports SOB is better today . Good urine output noted with diuresis 1.5 L yesterday   Remains on O2 at 5L/min. Tolerated BiPAP well last night.     Tridil drip discontinued yesterday due to hypotension   Remains on low dose BB and Ramipril restarted at 2.5 mg daily   Prognosis guarded.     12/9/19-  Overnight developed Atrial flutter and started on Amiodarone ggt and Heparin ggt. Amiodarone bolus was not given due to marginal BP.   On BB . Rate is fairly controlled.  EKG revealed persistent ST elevation anterior leads     Remains on O2 at 5 L/min via NC and nightly BiPAP  Good  urine output 1.3 L yesterday   Leukocytosis has resolved . Blood cultures are negative     Pt denies chest pain . Reports SOB is better at rest     12/10/19-  Pt feels fair. Denies chest pain though reports some chest tightness today . This is not like when he had MI. Reports SOB is better at rest. Has not moved from bed yet. Wears BiPAP at night. Now on NC at 4L/min.   Converted to sinus rhythm noted in EKG today . Persistent mild ST elevation noted ant leads   Amiodarone switched to pill  Remains on Heparin drip  On Lasix for diuresis . Urine output is good.  Renal indices are stable   Leukocytosis has resolved   12/11/19-  Remains on O2 at 4L/min.  Nightly BiPAP  Still c/o some SOB . Intermittent A.fib overnight and  had an episode of chest discomfort last night after eating ice cream  per nursing staff.  This morning pt denies chest pain but endorses SOB . Good diuresis is noted.   On Oral amiodarone . Started on NOAC by Cardiology today.  BB as BP permits   Lasix dose adjusted to IV daily per Cardiology   On Ranolazine   PT is seeing pt and is able to participate    12/12/19-  Pt is sitting in the bedside chair . Still C/O SOB with exertion though thinks it is getting better .  Episode of nausea and vomiting shortly after taking morning meds required a dose of antiemetic   Felt better shortly after giving Zofran. Appetite is fair   Performance with PT is improving   Remains on O2 at 4L/min   Good diuresis is noted   Pt denies chest pain     Interval History:   Pt is sitting in the bedside chair . Still C/O SOB with exertion though thinks it is getting better .  Episode of nausea and vomiting shortly after taking morning meds required a dose of antiemetic   Felt better shortly after giving Zofran. Appetite is fair   Performance with PT is improving   Remains on O2 at 4L/min   Good diuresis is noted   Pt denies chest pain     Review of Systems   Constitutional: Positive for activity change, appetite change and  fatigue. Negative for fever.   HENT: Negative for sore throat.    Eyes: Negative for visual disturbance.   Respiratory: Positive for shortness of breath. Negative for cough and chest tightness.    Cardiovascular: Negative for chest pain, palpitations and leg swelling.   Gastrointestinal: Negative for abdominal distention, abdominal pain, constipation, diarrhea, nausea and vomiting.   Endocrine: Negative for polyuria.   Genitourinary: Negative for decreased urine volume, dysuria, flank pain, frequency and hematuria.   Musculoskeletal: Negative for back pain and gait problem.   Skin: Negative for rash.   Neurological: Negative for syncope, speech difficulty, weakness, light-headedness and headaches.   Psychiatric/Behavioral: Negative for confusion, hallucinations and sleep disturbance.     Objective:     Vital Signs (Most Recent):  Temp: 97.9 °F (36.6 °C) (12/12/19 0705)  Pulse: 87 (12/12/19 0705)  Resp: 17 (12/12/19 0705)  BP: 93/70 (12/12/19 0705)  SpO2: 100 % (12/12/19 0705) Vital Signs (24h Range):  Temp:  [96.4 °F (35.8 °C)-97.9 °F (36.6 °C)] 97.9 °F (36.6 °C)  Pulse:  [86-98] 87  Resp:  [17-22] 17  SpO2:  [90 %-100 %] 100 %  BP: (84-99)/(58-70) 93/70     Weight: 73.3 kg (161 lb 9.6 oz)  Body mass index is 26.89 kg/m².    Intake/Output Summary (Last 24 hours) at 12/12/2019 1554  Last data filed at 12/12/2019 0826  Gross per 24 hour   Intake 700 ml   Output 450 ml   Net 250 ml      Physical Exam   Constitutional: He is oriented to person, place, and time. He appears well-developed and well-nourished. No distress.   HENT:   Head: Normocephalic and atraumatic.   Mouth/Throat: No oropharyngeal exudate.   Eyes: Pupils are equal, round, and reactive to light. Conjunctivae and EOM are normal.   Neck: Normal range of motion. Neck supple. No JVD present. No thyromegaly present.   Cardiovascular: Normal rate, regular rhythm and normal heart sounds.   No murmur heard.  Pulmonary/Chest: Effort normal and breath sounds  normal. No respiratory distress. He has no wheezes. He has no rales. He exhibits no tenderness.   No rales or crackles    Abdominal: Soft. Bowel sounds are normal. He exhibits no distension. There is no tenderness. There is no rebound and no guarding.   Musculoskeletal: Normal range of motion. He exhibits no edema.   Lymphadenopathy:     He has no cervical adenopathy.   Neurological: He is alert and oriented to person, place, and time. He displays normal reflexes. No cranial nerve deficit or sensory deficit.   Skin: Skin is warm and dry. No rash noted. He is not diaphoretic.   Psychiatric: He has a normal mood and affect.       Significant Labs:   CBC:   Recent Labs   Lab 12/12/19  0416   WBC 10.92   HGB 14.5   HCT 44.6        CMP:   Recent Labs   Lab 12/11/19  0332 12/12/19  0416 12/12/19  1244    134*  --    K 3.3* 4.3 4.6   CL 94* 94*  --    CO2 32* 29  --     109  --    BUN 45* 48*  --    CREATININE 1.3 1.3  --    CALCIUM 8.9 9.0  --    PROT  --  6.7  --    ALBUMIN  --  2.7*  --    BILITOT  --  1.4*  --    ALKPHOS  --  90  --    AST  --  52*  --    ALT  --  67*  --    ANIONGAP 10 11  --    EGFRNONAA 54* 54*  --        Significant Imaging:       Assessment/Plan:      * STEMI (ST elevation myocardial infarction)  STEMI   S/p PCI with JOSE to proximal LAD (See operative Note)  Continue ASA/Statin/BB/Plavix  Topical nitrites;  Supplemental oxygen;   Consider CPAP for desaturations  Pain relief measures: hydrocodone and Prn morphine  Continue as per Cardiology;  Trend troponin   12/8/19-  Continue ASA, Plavix , high intensity statin, BB and ACEI   12/9/19-  Persistent ST elevation is noted ant leads suggestive of apical aneurysm and poor LV recovery   Continue ASA, Plavix, Statin, BB, ACEI   12/10/19-  As above   12/11/19-  Asa, Plavix , Ranolazine ,Statin   BB/ACEI as BP permits   Life vest upon discharge , EF 25-30%   12/12/19-  Down trend troponin is noted   Continue ASA, Plavix , high intensity  statin   Continue BB, ACEI as BP permits     Acute systolic congestive heart failure, NYHA class 3  - New onset Post -MI   -Continue Diuresis   -Continue BB  -ACEI is discontinued by Cardiology due to hypotension or marginal BP   -Close monitoring in ICU   12/8/19-  Good diuresis is noted with IV Furosemide   Started back on ACEI   Continue BB  12/9/19-  Continue diuresis , BB, ACEI   12/10/19-  Clinically pt seems to be compensating   Continue diuresis .  BB and ACEI as BP permits   12/11/19-  Continue Diuresis   BB and ACEI as BP permits   BP remains marginal   EF 25-30%. Life vest on discharge   12/12/19   Lasix switched oral today   BB switch to oral today   Continue ACEI as BP permits   BP remains marginal   Life vest on discharge     Elevated LFTs  -Likely hepatic congestion due to acute CHF.   -Continue Diuresis   -Improving with diuresis   -Resolving     Paroxysmal VT  - Likely reperfusion arrhythmia   -continue BB  12/11/19-  No further VT noted in the last 72h     PENNY (acute kidney injury)  -Baseline creatinine 1.1 to 1.2  -Monitor Renal indices while on diuretic therapy   -Stable at 1.5 to 1.7   -Stable       Atrial flutter, New- onset   12/9/19  - New onset Atrial flutter  Post MI/ Post PCI   -Started on Amiodarone ggt and Heparin ggt  -Continue BB  -Continue ACEI  12/10/19-  Converted to sinus rhythm   Switched to oral Amiodarone   Remains on heparin ggt   12/11/19-  Started on NOAC  On Amiodarone oral   BB   12/12/19-  As above        Leukocytosis, likely reactive   - Likely reactive   -Procalcitonin is marginally elevated   -CXR- 12/8/19- bilateral interstitial infiltrate likely  due to pul congestion related to acute CHF   -Get Blood cultures x 2  -Get UA   -Monitor   -Resolved - 12/9/19      YESENIA (obstructive sleep apnea)  -Encouraged nightly C-PAP/ BiPAP        VTE Risk Mitigation (From admission, onward)         Ordered     apixaban tablet 5 mg  2 times daily      12/11/19 4428                 Critical care time spent on the evaluation and treatment of severe organ dysfunction, review of pertinent labs and imaging studies, discussions with consulting providers and discussions with patient/family: 30  minutes.      Gilmar Florence MD  Department of Hospital Medicine   [facility]

## 2019-12-12 NOTE — PLAN OF CARE
POC reviewed with pt. Family visited throughout shift. AAOx4. Follows commands. BRUNO. Respirations even, nonlabored on 4L NC.  Wore BiPAP for 3 hours then back to nasal cannula. NSR on monitor with ST depression; MD aware. One episode of nausea and vomiting. Zofran administered x1. Pt was up to chair most of shift and slept there.  Voids per urinal. BM x2.  Call bell in reach.  Wheels on recliner locked.  No acute distress noted.

## 2019-12-12 NOTE — SUBJECTIVE & OBJECTIVE
Interval History:   Pt is sitting in the bedside chair . Still C/O SOB with exertion though thinks it is getting better .  Episode of nausea and vomiting shortly after taking morning meds required a dose of antiemetic   Felt better shortly after giving Zofran. Appetite is fair   Performance with PT is improving   Remains on O2 at 4L/min   Good diuresis is noted   Pt denies chest pain     Review of Systems   Constitutional: Positive for activity change, appetite change and fatigue. Negative for fever.   HENT: Negative for sore throat.    Eyes: Negative for visual disturbance.   Respiratory: Positive for shortness of breath. Negative for cough and chest tightness.    Cardiovascular: Negative for chest pain, palpitations and leg swelling.   Gastrointestinal: Negative for abdominal distention, abdominal pain, constipation, diarrhea, nausea and vomiting.   Endocrine: Negative for polyuria.   Genitourinary: Negative for decreased urine volume, dysuria, flank pain, frequency and hematuria.   Musculoskeletal: Negative for back pain and gait problem.   Skin: Negative for rash.   Neurological: Negative for syncope, speech difficulty, weakness, light-headedness and headaches.   Psychiatric/Behavioral: Negative for confusion, hallucinations and sleep disturbance.     Objective:     Vital Signs (Most Recent):  Temp: 97.9 °F (36.6 °C) (12/12/19 0705)  Pulse: 87 (12/12/19 0705)  Resp: 17 (12/12/19 0705)  BP: 93/70 (12/12/19 0705)  SpO2: 100 % (12/12/19 0705) Vital Signs (24h Range):  Temp:  [96.4 °F (35.8 °C)-97.9 °F (36.6 °C)] 97.9 °F (36.6 °C)  Pulse:  [86-98] 87  Resp:  [17-22] 17  SpO2:  [90 %-100 %] 100 %  BP: (84-99)/(58-70) 93/70     Weight: 73.3 kg (161 lb 9.6 oz)  Body mass index is 26.89 kg/m².    Intake/Output Summary (Last 24 hours) at 12/12/2019 3135  Last data filed at 12/12/2019 0826  Gross per 24 hour   Intake 700 ml   Output 450 ml   Net 250 ml      Physical Exam   Constitutional: He is oriented to person, place,  and time. He appears well-developed and well-nourished. No distress.   HENT:   Head: Normocephalic and atraumatic.   Mouth/Throat: No oropharyngeal exudate.   Eyes: Pupils are equal, round, and reactive to light. Conjunctivae and EOM are normal.   Neck: Normal range of motion. Neck supple. No JVD present. No thyromegaly present.   Cardiovascular: Normal rate, regular rhythm and normal heart sounds.   No murmur heard.  Pulmonary/Chest: Effort normal and breath sounds normal. No respiratory distress. He has no wheezes. He has no rales. He exhibits no tenderness.   No rales or crackles    Abdominal: Soft. Bowel sounds are normal. He exhibits no distension. There is no tenderness. There is no rebound and no guarding.   Musculoskeletal: Normal range of motion. He exhibits no edema.   Lymphadenopathy:     He has no cervical adenopathy.   Neurological: He is alert and oriented to person, place, and time. He displays normal reflexes. No cranial nerve deficit or sensory deficit.   Skin: Skin is warm and dry. No rash noted. He is not diaphoretic.   Psychiatric: He has a normal mood and affect.       Significant Labs:   CBC:   Recent Labs   Lab 12/12/19  0416   WBC 10.92   HGB 14.5   HCT 44.6        CMP:   Recent Labs   Lab 12/11/19  0332 12/12/19  0416 12/12/19  1244    134*  --    K 3.3* 4.3 4.6   CL 94* 94*  --    CO2 32* 29  --     109  --    BUN 45* 48*  --    CREATININE 1.3 1.3  --    CALCIUM 8.9 9.0  --    PROT  --  6.7  --    ALBUMIN  --  2.7*  --    BILITOT  --  1.4*  --    ALKPHOS  --  90  --    AST  --  52*  --    ALT  --  67*  --    ANIONGAP 10 11  --    EGFRNONAA 54* 54*  --        Significant Imaging:

## 2019-12-12 NOTE — ASSESSMENT & PLAN NOTE
- New onset Post -MI   -Continue Diuresis   -Continue BB  -ACEI is discontinued by Cardiology due to hypotension or marginal BP   -Close monitoring in ICU   12/8/19-  Good diuresis is noted with IV Furosemide   Started back on ACEI   Continue BB  12/9/19-  Continue diuresis , BB, ACEI   12/10/19-  Clinically pt seems to be compensating   Continue diuresis .  BB and ACEI as BP permits   12/11/19-  Continue Diuresis   BB and ACEI as BP permits   BP remains marginal   EF 25-30%. Life vest on discharge   12/12/19   Lasix switched oral today   BB switch to oral today   Continue ACEI as BP permits   BP remains marginal   Life vest on discharge

## 2019-12-12 NOTE — PROGRESS NOTES
[facility] -   Cardiology  Progress Note    Patient Name: Raleigh Walsh  MRN: [id number]  Admission Date: 12/5/2019  Hospital Length of Stay: 7 days  Code Status: No Order   Attending Physician: Gilmar Florence MD   Primary Care Physician: Akhil Smith MD  Expected Discharge Date:   Principal Problem:STEMI (ST elevation myocardial infarction)    Subjective:   HPI:  75 y/o male woth PMHx for CADHx of PCI, HTN, HLP presented to [facility] ER with anterior MI and tx with TNK. [facility] Pt initially reperfused clinically but later on with CP.  Pt taken to the cath lab for postinfarct angina.    Hospital Course:   12/6/19--Patient seen and examined in room, lying in bed. Continues to complain of intermittent chest discomfort today but much improved since PCI. He was noted to have 8-10 beats of VT on monitor at time of exam this AM. Keep in ICU for now. Labs reviewed, K+ 4.8, Cr 1.4, Troponin >50.     12/7/19--Patient seen and examined in ICU, lying in bed. Had multiple episodes of shortness of breath, palpitations with diaphoresis noted o/n. HR at time of exam 110s. Will optimize medical regimen for CAD/CHF today. Discussed with patient and family at bedside given guarded prognosis given ICM post STEMI. Will add Tridil gtt today and reassess response.     12/8/19--Patient seen and examined in ICU today. Reports continued shortness of breath today but slightly/marginally improved from yesterday. HR remains elevated today in 110-120s, EKG revealed ST with PACs today. Patient unable to tolerate Tridil gtt yesterday due to hypotension. Will try to optimize medical regimen today but low BP and elevated HR makes medical therapy challenging. Labs reviewed, K+ 4.3, Cr 1.7, BUN 42. Repeat CXR ordered.     12/9/19-Patient seen and examined today. Still SOB and fatigued, some improvement overnight. Denies chest pain. Converted to aflutter this AM, amiodarone drip initiated without bolus. Labs  reviewed. Creatinine 1.5, liver enzymes improving. Troponin trending down. BP remains marginal.     12/10/19-Patient seen and examined today. Appears slightly stronger, more talkative. Still endorses SOB. No sharan chest pain or tightness. Converted to SR overnight. Labs reviewed. Creatinine stable.     12/11/19-Patient seen and examined today, sitting up in chair. Slowly progressing. SOB improved. Did admit to some mild chest tightness overnight, has since resolved. Intermittent afib noted as well. Labs reviewed, K slightly low at 3.3. BP stable.     12/12/19-Patient seen and examined today, sitting up in chair. Ambulated around ICU unit this AM. Admits to fatigue/malaise. SOB improving. No chest pain/tightness overnight. Labs reviewed and are stable. BP borderline.        Review of Systems   Constitution: Positive for malaise/fatigue.   Eyes: Negative.    Cardiovascular: Positive for dyspnea on exertion.   Respiratory: Positive for shortness of breath.    Hematologic/Lymphatic: Bruises/bleeds easily.   Skin: Negative.    Musculoskeletal: Negative.    Gastrointestinal: Negative.    Genitourinary: Negative.    Neurological: Positive for weakness.   Psychiatric/Behavioral: Negative.    Allergic/Immunologic: Negative.      Objective:     Vital Signs (Most Recent):  Temp: 97.9 °F (36.6 °C) (12/12/19 0705)  Pulse: 87 (12/12/19 0705)  Resp: 17 (12/12/19 0705)  BP: 93/70 (12/12/19 0705)  SpO2: 100 % (12/12/19 0705) Vital Signs (24h Range):  Temp:  [96.4 °F (35.8 °C)-98.4 °F (36.9 °C)] 97.9 °F (36.6 °C)  Pulse:  [86-99] 87  Resp:  [17-24] 17  SpO2:  [90 %-100 %] 100 %  BP: ()/(43-74) 93/70     Weight: 73.3 kg (161 lb 9.6 oz)  Body mass index is 26.89 kg/m².     SpO2: 100 %  O2 Device (Oxygen Therapy): nasal cannula      Intake/Output Summary (Last 24 hours) at 12/12/2019 1132  Last data filed at 12/12/2019 0826  Gross per 24 hour   Intake 700 ml   Output 650 ml   Net 50 ml       Lines/Drains/Airways     Peripherally  Inserted Central Catheter Line                 PICC Double Lumen 12/09/19 0820 right brachial 3 days          Peripheral Intravenous Line                 Peripheral IV - Single Lumen 12/05/19 2032 18 G Right Antecubital 6 days                Physical Exam   Constitutional: He is oriented to person, place, and time. He appears well-developed and well-nourished. No distress.   HENT:   Head: Normocephalic and atraumatic.   Eyes: Pupils are equal, round, and reactive to light. Right eye exhibits no discharge. Left eye exhibits no discharge.   Neck: Neck supple. No JVD present.   Cardiovascular: Normal rate, regular rhythm, S1 normal, S2 normal and normal heart sounds.   No murmur heard.  Pulmonary/Chest: Effort normal. No respiratory distress. He has no wheezes.   Diminished BS at bases   Abdominal: Soft. He exhibits no distension. There is no tenderness.   Musculoskeletal: He exhibits no edema.   Neurological: He is alert and oriented to person, place, and time.   Skin: Skin is warm and dry. He is not diaphoretic. No erythema.   Right groin access site C/D/I; no bleeding erythema or drainage   Psychiatric: He has a normal mood and affect. His behavior is normal. Thought content normal.   Nursing note and vitals reviewed.      Significant Labs:   CMP   Recent Labs   Lab 12/11/19 0332 12/12/19 0416    134*   K 3.3* 4.3   CL 94* 94*   CO2 32* 29    109   BUN 45* 48*   CREATININE 1.3 1.3   CALCIUM 8.9 9.0   PROT  --  6.7   ALBUMIN  --  2.7*   BILITOT  --  1.4*   ALKPHOS  --  90   AST  --  52*   ALT  --  67*   ANIONGAP 10 11   ESTGFRAFRICA >60 >60   EGFRNONAA 54* 54*   , CBC   Recent Labs   Lab 12/12/19 0416   WBC 10.92   HGB 14.5   HCT 44.6      , Troponin   Recent Labs   Lab 12/12/19 0416   TROPONINI 12.839*    and All pertinent lab results from the last 24 hours have been reviewed.    Significant Imaging: Echocardiogram:   2D echo with color flow doppler:   Results for orders placed or performed  during the hospital encounter of 12/05/19   2D echo with color flow doppler   Result Value Ref Range    QEF 25 (A) 55 - 65    Pericardial Effusion SMALL (A)     Narrative    Date of Procedure: 12/07/2019        TEST DESCRIPTION   Technical Quality: This is a technically adequate study.     Aorta: The aortic root is normal in size.     Left Atrium: The left atrium is normal in size.     Left Ventricle: The left ventricle is normal in size. LV wall thickness is normal. The following segments were akinetic: apical septum, apical lateral wall, apical inferior wall, apical anterior wall.  The following segments were mildly hypokinetic: basal anterior wall.  The following segments were severely hypokinetic: mid inferoseptum, mid anterior wall.  Left ventricular systolic function appears severely depressed. Visually estimated ejection fraction is 25-30%.     Diastolic indices:     Right Atrium: The right atrium is normal in size.     Right Ventricle: The right ventricle is normal in size. Global right ventricular systolic function appears normal.     Aortic Valve:  The aortic valve is normal in structure.     Mitral Valve:  The mitral valve is normal in structure.     Pulmonary Valve:  The pulmonic valve is not well seen.     Pericardium: There is evidence of a small postero-lateral pericardial effusion.     IVC: IVC is enlarged but collapses > 50% with a sniff, suggesting intermediate right atrial pressure of 8 mmHg.     Intracavitary: There is no evidence of intracavity mass, thrombi, or vegetation.         CONCLUSIONS     1 - Wall motion abnormalities.     2 - Severely depressed left ventricular systolic function (EF 25-30%).     3 - Normal right ventricular systolic function .     4 - Focal small pericardial effusion on right side. No tamponade    5 - Intermediate central venous pressure.     6 - Limited echo study to rule out mechanical rupture.            This document has been electronically    SIGNED BY: Lamont Andres  MD On: 12/08/2019 10:28   , EKG: Reviewed and X-Ray: CXR: X-Ray Chest 1 View (CXR):   Results for orders placed or performed during the hospital encounter of 12/05/19   X-Ray Chest 1 View    Narrative    EXAMINATION:  XR CHEST 1 VIEW    CLINICAL HISTORY:  PICC placement;    TECHNIQUE:  Single frontal view of the chest was performed.    COMPARISON:  Chest radiograph 12/08/2019 with priors    FINDINGS:  Cardiac leads project over the chest.  Interval placement of a right-sided peripherally inserted central venous catheter with tip projecting in the SVC.  Cardiomediastinal silhouette is within normal limits and stable.  Persistent interstitial infiltrates.  No large consolidative opacity, effusion or pneumothorax.  Osseous structures appear intact.      Impression    Interval placement of a right-sided PICC with tip projecting in the SVC.    Stable bilateral interstitial infiltrates.      Electronically signed by: Henry Martin  Date:    12/09/2019  Time:    09:17    and X-Ray Chest PA and Lateral (CXR): No results found for this visit on 12/05/19.    Assessment and Plan:   Patient who presents with STEMI/ICM. Recovering slowly. Continue same mgmt. Will continue to further optimize med regimen as clinical condition permits.    * STEMI (ST elevation myocardial infarction)  73 y/o male woth PMHx for CADHx of PCI, HTN, HLP presented to [facility] ER with anterior MI and tx with TNK. [facility] Pt initially reperfused clinically but later on with CP.  Pt taken to the cath lab for postinfarct angina.    12/6/19  -s/p PCI of LAD per Dr. Alcazar  -ECHO pending  -Continue ASA, Statin, BB, ACEi, Plavix  -Check FLP  -Keep in ICU for today  -Dash diet, 2 gm sodium restriction  -1.5L Fluid restriction  -Repeat labs in AM    12/7/19  -Echo revealed EF 15-20%, +WMA's, PHTN, DD  -Add Tridil gtt today  -Stop ACEi today given need for Tridil gtt for symptom management   -Continue ASA, Statin, Plavix, BB, IV lasix BID,  Tridil gtt  -Further recs to follow pending clinical course    12/8/19  -Continue ASA, Statin, Plavix, IV lasix BID, IV lopressor q6H  -Add ramipril if BP can tolerate  -keep in ICU for close monitoring given clinical condition with little/no improvement    12/9/19  -No chest pain overnight, SOB slowly improving  -Continue ASA, statin, Plavix, IV Lasix, IV lopressor  -Hold ACEi if needed given borderline BP  -Keep in ICU    12/10/19  -Slowly progressing post MI  -Continue ASA, statin, Plavix  -Continue other meds as BP permits  -Will further optimize as clinical condition permits  -Will consider low dose dobutamine, if needed but would like to try to avoid if possible given risk of recurrent aflutter/arrhythmias     12/11/19  -Continues to progress   -Continue ASA, statin, Plavix  -Continue BB and ACEi as BP permits  -Ranexa 500 mg BID added  -OOB and ambulate today if possible    12/12/19  -Chest pain free overnight  -Continue ASA, statin, Plavix  -Add low dose Toprol XL   -Continue Ranexa  -Will add ACEi/ARB once BP permits    Atrial flutter, New- onset   -HR controlled at time of exam  -Continue amio gtt  -Continue BB as BP permits  -Continue heparin gtt    12/10/19  -Converted to SR this AM  -Switch amiodarone gtt to po  -No BB given borderline BP  -Continue heparin gtt for now; may not need long term AC given brief duration of arrhythmia; will re-evaluate    12/11/19  -Intermittent afib noted overnight    12/12/19  -SR at time of exam  -Add low dose BB  -Continue Eliquis for CVA prophylaxis     Leukocytosis, likely reactive   -Mgmt per primary team    PENNY (acute kidney injury)  -Creatinine stable, monitor  -Repeat BMP in AM    Acute systolic congestive heart failure, NYHA class 3  -EF 15-20%, +WMA, PHTN  -Continue IV lasix BID, IV lopressor Q6H  -Use Ramipril if BP can tolerate  -Dash diet, 2 gm sodium restriction  -1200 ml fluid restriction  -Daily weights  -Strict intake and output  -Avoid exertional  activities  -CXR stable  -Reassess in AM    12/9/19  -Slowly improving  -Continue IV diuresis  -Continue BB and ACEi as BP permits  -Optimization of medical therapy challenging given hypotension     12/10/19  -Appears slightly improved today  -BP marginal  -Hold BB, ACEi, and Lasix for now  -Will further optimize medical therapy as clinical condition permits  -Will consider low dose dobutamine, if needed  -LifeVest upon d/c home for SCD prevention    12/11/19  -Slowly progressing  -Decrease IV Lasix to 40 mg daily  -Continue BB and ACEi as BP permits  -Add Ranexa 500 mg BID  -Will further optimize regimen as clinical condition permits    12/12/19  -Continues to make progress  -Switch to po Lasix tmw  -Add low dose Toprol XL  -Continue Ranexa  -Will add ACEi/ARB once BP permits    YESENIA (obstructive sleep apnea)  -continue nightly cpap    Paroxysmal VT  Continue BB  Keep K+ >4 and Mag >2  Continue telemetry monitoring  Keep in ICU for now    12/7  -continue BB    12/8  -keep K+>4 and Mag>2  -Continue BB as tolerated    12/9/19  -Keep electrolytes WNL  -Continue BB as BP permits    12/10/19  -See plan above  -LifeVest for SCD prevention    Mixed hyperlipidemia  -Continue statin  -Check FLP        VTE Risk Mitigation (From admission, onward)         Ordered     apixaban tablet 5 mg  Once      12/12/19 1008     apixaban tablet 5 mg  2 times daily      12/11/19 4086                Veronica Raygoza PA-C  Cardiology  [facility]

## 2019-12-12 NOTE — ASSESSMENT & PLAN NOTE
73 y/o male woth PMHx for CADHx of PCI, HTN, HLP presented to [facility] ER with anterior MI and tx with TNK. Pt transferred to [facility]. Pt initially reperfused clinically but later on with CP.  Pt taken to the cath lab for postinfarct angina.    12/6/19  -s/p PCI of LAD per Dr. Alcazar  -ECHO pending  -Continue ASA, Statin, BB, ACEi, Plavix  -Check FLP  -Keep in ICU for today  -Dash diet, 2 gm sodium restriction  -1.5L Fluid restriction  -Repeat labs in AM    12/7/19  -Echo revealed EF 15-20%, +WMA's, PHTN, DD  -Add Tridil gtt today  -Stop ACEi today given need for Tridil gtt for symptom management   -Continue ASA, Statin, Plavix, BB, IV lasix BID, Tridil gtt  -Further recs to follow pending clinical course    12/8/19  -Continue ASA, Statin, Plavix, IV lasix BID, IV lopressor q6H  -Add ramipril if BP can tolerate  -keep in ICU for close monitoring given clinical condition with little/no improvement    12/9/19  -No chest pain overnight, SOB slowly improving  -Continue ASA, statin, Plavix, IV Lasix, IV lopressor  -Hold ACEi if needed given borderline BP  -Keep in ICU    12/10/19  -Slowly progressing post MI  -Continue ASA, statin, Plavix  -Continue other meds as BP permits  -Will further optimize as clinical condition permits  -Will consider low dose dobutamine, if needed but would like to try to avoid if possible given risk of recurrent aflutter/arrhythmias     12/11/19  -Continues to progress   -Continue ASA, statin, Plavix  -Continue BB and ACEi as BP permits  -Ranexa 500 mg BID added  -OOB and ambulate today if possible    12/12/19  -Chest pain free overnight  -Continue ASA, statin, Plavix  -Add low dose Toprol XL   -Continue Ranexa  -Will add ACEi/ARB once BP permits

## 2019-12-12 NOTE — ASSESSMENT & PLAN NOTE
-HR controlled at time of exam  -Continue amio gtt  -Continue BB as BP permits  -Continue heparin gtt    12/10/19  -Converted to SR this AM  -Switch amiodarone gtt to po  -No BB given borderline BP  -Continue heparin gtt for now; may not need long term AC given brief duration of arrhythmia; will re-evaluate    12/11/19  -Intermittent afib noted overnight    12/12/19  -SR at time of exam  -Add low dose BB  -Continue Eliquis for CVA prophylaxis

## 2019-12-12 NOTE — ASSESSMENT & PLAN NOTE
STEMI   S/p PCI with JOSE to proximal LAD (See operative Note)  Continue ASA/Statin/BB/Plavix  Topical nitrites;  Supplemental oxygen;   Consider CPAP for desaturations  Pain relief measures: hydrocodone and Prn morphine  Continue as per Cardiology;  Trend troponin   12/8/19-  Continue ASA, Plavix , high intensity statin, BB and ACEI   12/9/19-  Persistent ST elevation is noted ant leads suggestive of apical aneurysm and poor LV recovery   Continue ASA, Plavix, Statin, BB, ACEI   12/10/19-  As above   12/11/19-  Asa, Plavix , Ranolazine ,Statin   BB/ACEI as BP permits   Life vest upon discharge , EF 25-30%   12/12/19-  Down trend troponin is noted   Continue ASA, Plavix , high intensity statin   Continue BB, ACEI as BP permits

## 2019-12-12 NOTE — ASSESSMENT & PLAN NOTE
-EF 15-20%, +WMA, PHTN  -Continue IV lasix BID, IV lopressor Q6H  -Use Ramipril if BP can tolerate  -Dash diet, 2 gm sodium restriction  -1200 ml fluid restriction  -Daily weights  -Strict intake and output  -Avoid exertional activities  -CXR stable  -Reassess in AM    12/9/19  -Slowly improving  -Continue IV diuresis  -Continue BB and ACEi as BP permits  -Optimization of medical therapy challenging given hypotension     12/10/19  -Appears slightly improved today  -BP marginal  -Hold BB, ACEi, and Lasix for now  -Will further optimize medical therapy as clinical condition permits  -Will consider low dose dobutamine, if needed  -LifeVest upon d/c home for SCD prevention    12/11/19  -Slowly progressing  -Decrease IV Lasix to 40 mg daily  -Continue BB and ACEi as BP permits  -Add Ranexa 500 mg BID  -Will further optimize regimen as clinical condition permits    12/12/19  -Continues to make progress  -Switch to po Lasix tmw  -Add low dose Toprol XL  -Continue Ranexa  -Will add ACEi/ARB once BP permits

## 2019-12-12 NOTE — PROGRESS NOTES
[facility] Hospital Medicine  Progress Note    Patient Name: Raleigh Walsh  MRN: [id number]  Patient Class: IP- Inpatient   Admission Date: 12/5/2019  Length of Stay: 6 days  Attending Physician: Gilmar Florence MD  Primary Care Provider: Akhil Smith MD        Subjective:     Principal Problem:STEMI (ST elevation myocardial infarction)        HPI:  75 YO WM with known CAD S/P MI and prior stenting;  Presented to ED at [facility] after experiencing chest pain while at a ballgame; He ruled in foe a STEMI; was treated with TPA and transferred here; He was taken to the cath lab where he underwent PCI with stent placed to the proximal LAD by Dr. Alcazar.  He was transferred to the ICU in stable condition.    Overview/Hospital Course:  12/7/19-  Pt c/o new onset SOB on minimal exertion . Denies chest pain.   Remains on O2 at 5L/min. Pt is noted to be tachycardic and tachypnic.   Episodes of nausea and emesis x 3 yesterday but none today   EKG revealed sinus tachycardia , ST elevation ant and lat leads and no longer PVC's   Echo revealed severely depressed left vent systolic function with EF of 15-20%, PA pressure 43.   Cardiology is recommending diuresis with IV furosemide , continue BB and start Tridil drip.   Prognosis is guarded at this time.   12/8/19-  Pt is seen at bedside . Remains mildly tachycardic and tachypnic .  Reports SOB is better today . Good urine output noted with diuresis 1.5 L yesterday   Remains on O2 at 5L/min. Tolerated BiPAP well last night.     Tridil drip discontinued yesterday due to hypotension   Remains on low dose BB and Ramipril restarted at 2.5 mg daily   Prognosis guarded.     12/9/19-  Overnight developed Atrial flutter and started on Amiodarone ggt and Heparin ggt. Amiodarone bolus was not given due to marginal BP.   On BB . Rate is fairly controlled.  EKG revealed persistent ST elevation anterior leads     Remains on O2 at 5 L/min via NC and nightly BiPAP  Good  urine output 1.3 L yesterday   Leukocytosis has resolved . Blood cultures are negative     Pt denies chest pain . Reports SOB is better at rest     12/10/19-  Pt feels fair. Denies chest pain though reports some chest tightness today . This is not like when he had MI. Reports SOB is better at rest. Has not moved from bed yet. Wears BiPAP at night. Now on NC at 4L/min.   Converted to sinus rhythm noted in EKG today . Persistent mild ST elevation noted ant leads   Amiodarone switched to pill  Remains on Heparin drip  On Lasix for diuresis . Urine output is good.  Renal indices are stable   Leukocytosis has resolved   12/11/19-  Remains on O2 at 4L/min.  Nightly BiPAP  Still c/o some SOB . Intermittent A.fib overnight and  had an episode of chest discomfort last night after eating ice cream  per nursing staff.  This morning pt denies chest pain but endorses SOB . Good diuresis is noted.   On Oral amiodarone . Started on NOAC by Cardiology today.  BB as BP permits   Lasix dose adjusted to IV daily per Cardiology   On Ranolazine   PT is seeing pt and is able to participate        Interval History:   Remains on O2 at 4L/min.  Nightly BiPAP  Still c/o some SOB . Intermittent A.fib overnight and  had an episode of chest discomfort last night after eating ice cream  per nursing staff.  This morning pt denies chest pain but endorses SOB . Good diuresis is noted.   On Oral amiodarone . Started on NOAC by Cardiology today.  BB as BP permits   Lasix dose adjusted to IV daily per Cardiology   On Ranolazine   PT is seeing pt and is able to participate          Review of Systems   Constitutional: Positive for activity change and appetite change. Negative for fever.   HENT: Negative for sore throat.    Eyes: Negative for visual disturbance.   Respiratory: Positive for chest tightness (one episode yesterday ) and shortness of breath. Negative for cough.    Cardiovascular: Negative for chest pain, palpitations and leg swelling.    Gastrointestinal: Negative for abdominal distention, abdominal pain, constipation, diarrhea, nausea and vomiting.   Endocrine: Negative for polyuria.   Genitourinary: Negative for decreased urine volume, dysuria, flank pain, frequency and hematuria.   Musculoskeletal: Negative for back pain and gait problem.   Skin: Negative for rash.   Neurological: Negative for syncope, speech difficulty, weakness, light-headedness and headaches.   Psychiatric/Behavioral: Negative for confusion, hallucinations and sleep disturbance.     Objective:     Vital Signs (Most Recent):  Temp: 98.4 °F (36.9 °C) (12/11/19 1505)  Pulse: 88 (12/11/19 1700)  Resp: 19 (12/11/19 1700)  BP: (!) 84/59 (12/11/19 1700)  SpO2: 96 % (12/11/19 1700) Vital Signs (24h Range):  Temp:  [97 °F (36.1 °C)-98.4 °F (36.9 °C)] 98.4 °F (36.9 °C)  Pulse:  [] 88  Resp:  [12-24] 19  SpO2:  [96 %-100 %] 96 %  BP: ()/(43-78) 84/59     Weight: 73.3 kg (161 lb 9.6 oz)  Body mass index is 26.89 kg/m².    Intake/Output Summary (Last 24 hours) at 12/11/2019 1840  Last data filed at 12/11/2019 1700  Gross per 24 hour   Intake 1720.41 ml   Output 1490 ml   Net 230.41 ml      Physical Exam   Constitutional: He is oriented to person, place, and time. He appears well-developed and well-nourished. No distress.   HENT:   Head: Normocephalic and atraumatic.   Mouth/Throat: No oropharyngeal exudate.   Eyes: Pupils are equal, round, and reactive to light. Conjunctivae and EOM are normal.   Neck: Normal range of motion. Neck supple. No JVD present. No thyromegaly present.   Cardiovascular: Normal rate, regular rhythm and normal heart sounds.   No murmur heard.  Pulmonary/Chest: Effort normal and breath sounds normal. No respiratory distress. He has no wheezes. He has no rales. He exhibits no tenderness.   No wheezes , no rales    Abdominal: Soft. Bowel sounds are normal. He exhibits no distension. There is no tenderness. There is no rebound and no guarding.    Musculoskeletal: Normal range of motion. He exhibits no edema.   Lymphadenopathy:     He has no cervical adenopathy.   Neurological: He is alert and oriented to person, place, and time. He displays normal reflexes. No cranial nerve deficit or sensory deficit.   Skin: Skin is warm and dry. No rash noted. He is not diaphoretic.   Psychiatric: He has a normal mood and affect.       Significant Labs:   CBC:   Recent Labs   Lab 12/10/19  0441   WBC 10.30   HGB 13.8*   HCT 41.8        CMP:   Recent Labs   Lab 12/10/19  0441 12/11/19  0332   * 136   K 3.9 3.3*   CL 94* 94*   CO2 30* 32*   * 104   BUN 53* 45*   CREATININE 1.5* 1.3   CALCIUM 9.0 8.9   PROT 6.5  --    ALBUMIN 2.7*  --    BILITOT 0.9  --    ALKPHOS 71  --    AST 42*  --    ALT 42  --    ANIONGAP 11 10   EGFRNONAA 46* 54*       Significant Imaging:       Assessment/Plan:      * STEMI (ST elevation myocardial infarction)  STEMI   S/p PCI with JOSE to proximal LAD (See operative Note)  Continue ASA/Statin/BB/Plavix  Topical nitrites;  Supplemental oxygen;   Consider CPAP for desaturations  Pain relief measures: hydrocodone and Prn morphine  Continue as per Cardiology;  Trend troponin   12/8/19-  Continue ASA, Plavix , high intensity statin, BB and ACEI   12/9/19-  Persistent ST elevation is noted ant leads suggestive of apical aneurysm and poor LV recovery   Continue ASA, Plavix, Statin, BB, ACEI   12/10/19-  As above   12/11/19-  Asa, Plavix , Ranolazine ,Statin   BB/ACEI as BP permits   Life vest upon discharge , EF 25-30%     Acute systolic congestive heart failure, NYHA class 3  - New onset Post -MI   -Continue Diuresis   -Continue BB  -ACEI is discontinued by Cardiology due to hypotension or marginal BP   -Close monitoring in ICU   12/8/19-  Good diuresis is noted with IV Furosemide   Started back on ACEI   Continue BB  12/9/19-  Continue diuresis , BB, ACEI   12/10/19-  Clinically pt seems to be compensating   Continue diuresis .  BB  and ACEI as BP permits   12/11/19-  Continue Diuresis   BB and ACEI as BP permits   BP remains marginal   EF 25-30%. Life vest upon discharge     Elevated LFTs  -Likely hepatic congestion due to acute CHF.   -Continue Diuresis   -Improving with diuresis   -Resolving     Paroxysmal VT  - Likely reperfusion arrhythmia   -continue BB  12/11/19-  No further VT noted in the last 72h     PENNY (acute kidney injury)  -Baseline creatinine 1.1 to 1.2  -Monitor Renal indices while on diuretic therapy   -Stable at 1.5 to 1.7   -Stable       Atrial flutter, New- onset   12/9/19  - New onset Atrial flutter  Post MI/ Post PCI   -Started on Amiodarone ggt and Heparin ggt  -Continue BB  -Continue ACEI  12/10/19-  Converted to sinus rhythm   Switched to oral Amiodarone   Remains on heparin ggt   12/11/19-  Started on NOAC  On Amiodarone oral   BB         Leukocytosis, likely reactive   - Likely reactive   -Procalcitonin is marginally elevated   -CXR- 12/8/19- bilateral interstitial infiltrate likely  due to pul congestion related to acute CHF   -Get Blood cultures x 2  -Get UA   -Monitor   -Resolved - 12/9/19      YESENIA (obstructive sleep apnea)  -Encouraged nightly C-PAP/ BiPAP        VTE Risk Mitigation (From admission, onward)         Ordered     apixaban tablet 5 mg  2 times daily      12/11/19 0929                Critical care time spent on the evaluation and treatment of severe organ dysfunction, review of pertinent labs and imaging studies, discussions with consulting providers and discussions with patient/family: 30 minutes.      Gilmar Florence MD  Department of Hospital Medicine   [facility]

## 2019-12-12 NOTE — ASSESSMENT & PLAN NOTE
STEMI   S/p PCI with JOSE to proximal LAD (See operative Note)  Continue ASA/Statin/BB/Plavix  Topical nitrites;  Supplemental oxygen;   Consider CPAP for desaturations  Pain relief measures: hydrocodone and Prn morphine  Continue as per Cardiology;  Trend troponin   12/8/19-  Continue ASA, Plavix , high intensity statin, BB and ACEI   12/9/19-  Persistent ST elevation is noted ant leads suggestive of apical aneurysm and poor LV recovery   Continue ASA, Plavix, Statin, BB, ACEI   12/10/19-  As above   12/11/19-  Asa, Plavix , Ranolazine ,Statin   BB/ACEI as BP permits   Life vest upon discharge , EF 25-30%

## 2019-12-12 NOTE — ASSESSMENT & PLAN NOTE
12/9/19  - New onset Atrial flutter  Post MI/ Post PCI   -Started on Amiodarone ggt and Heparin ggt  -Continue BB  -Continue ACEI  12/10/19-  Converted to sinus rhythm   Switched to oral Amiodarone   Remains on heparin ggt   12/11/19-  Started on NOAC  On Amiodarone oral   BB   12/12/19-  As above

## 2019-12-13 PROBLEM — R10.13 DYSPEPSIA: Status: ACTIVE | Noted: 2019-01-01

## 2019-12-13 NOTE — ASSESSMENT & PLAN NOTE
-EF 15-20%, +WMA, PHTN  -Continue IV lasix BID, IV lopressor Q6H  -Use Ramipril if BP can tolerate  -Dash diet, 2 gm sodium restriction  -1200 ml fluid restriction  -Daily weights  -Strict intake and output  -Avoid exertional activities  -CXR stable  -Reassess in AM    12/9/19  -Slowly improving  -Continue IV diuresis  -Continue BB and ACEi as BP permits  -Optimization of medical therapy challenging given hypotension     12/10/19  -Appears slightly improved today  -BP marginal  -Hold BB, ACEi, and Lasix for now  -Will further optimize medical therapy as clinical condition permits  -Will consider low dose dobutamine, if needed  -LifeVest upon d/c home for SCD prevention    12/11/19  -Slowly progressing  -Decrease IV Lasix to 40 mg daily  -Continue BB and ACEi as BP permits  -Add Ranexa 500 mg BID  -Will further optimize regimen as clinical condition permits    12/12/19  -Continues to make progress  -Switch to po Lasix tmw  -Add low dose Toprol XL  -Continue Ranexa  -Will add ACEi/ARB once BP permits    12/13/19  -LifeVest prior to discharge  -Hold Lasix today given bump in renal fn  -Continue Toprol XL, Ranexa  -Add ACEi/ARB once BP, renal fn allows

## 2019-12-13 NOTE — PT/OT/SLP PROGRESS
Physical Therapy      Patient Name:  Raleigh Walsh   MRN:  [id number]    Patient not seen today secondary to abdominal soreness per family They requested to hold PT until tomorrow. Will follow-up on next session.    Bertram Pink, PT

## 2019-12-13 NOTE — ASSESSMENT & PLAN NOTE
- Change H2 blocker to PPI  -GI cocktail X1  -Get U/S abdomen   - Gary S/L for cramp as needed   -Simethicone as needed for bloating   -Continue Laxatives / stool softener

## 2019-12-13 NOTE — ASSESSMENT & PLAN NOTE
- New onset Post -MI   -Continue Diuresis   -Continue BB  -ACEI is discontinued by Cardiology due to hypotension or marginal BP   -Close monitoring in ICU   12/8/19-  Good diuresis is noted with IV Furosemide   Started back on ACEI   Continue BB  12/9/19-  Continue diuresis , BB, ACEI   12/10/19-  Clinically pt seems to be compensating   Continue diuresis .  BB and ACEI as BP permits   12/11/19-  Continue Diuresis   BB and ACEI as BP permits   BP remains marginal   EF 25-30%. Life vest on discharge   12/12/19   Lasix switched oral today   BB switch to oral today   Continue ACEI as BP permits   BP remains marginal   Life vest on discharge   12/13/19-  Cardiology held lasix today due to bump in creatinine   Remains on O2 at 4L/min.   On exam pt appears near euvolemic

## 2019-12-13 NOTE — ASSESSMENT & PLAN NOTE
-HR controlled at time of exam  -Continue amio gtt  -Continue BB as BP permits  -Continue heparin gtt    12/10/19  -Converted to SR this AM  -Switch amiodarone gtt to po  -No BB given borderline BP  -Continue heparin gtt for now; may not need long term AC given brief duration of arrhythmia; will re-evaluate    12/11/19  -Intermittent afib noted overnight    12/12/19  -SR at time of exam  -Add low dose BB  -Continue Eliquis for CVA prophylaxis     12/13/19  -Continue BB, Eliquis

## 2019-12-13 NOTE — PROGRESS NOTES
"  [facility]   Adult Nutrition  Progress Note    SUMMARY     Recommendations    Recommendation: 1. Add Boost Breeze TID. 2. Continue diet as tolerated. 3. RD to follow   Goals: >85% EEN/EPN by RD f/u  Nutrition Goal Status: new  Communication of RD Recs: (POC, sticky note)    Reason for Assessment    Reason For Assessment: length of stay  Diagnosis: (STEMI)  Relevant Medical History: HTN, MI, HLD  General Information Comments: Pt in ICU. During visit, pt was nauseous and in pain. Family at bedside reports pt has poor appetite today and mostly only accepting clear liquids and some soups (PO intake 25%). Reports appetite good PTA. No reported weight loss. No previous weights in EMR to determine weight change. NFPE performed 12/13/19. No significant subcutaneous fat depletion or muscle wasting noted.  Nutrition Discharge Planning: Cardiac, fluid restricted diet    Nutrition Risk Screen    Nutrition Risk Screen: no indicators present    Nutrition/Diet History    Spiritual, Cultural Beliefs, Yarsanism Practices, Values that Affect Care: no      Anthropometrics    Height: 5' 5" (165.1 cm)  Height (inches): 65 in  Weight: 73.3 kg (161 lb 9.6 oz)  Ideal Body Weight (IBW), Male: 136 lb  % Ideal Body Weight, Male (lb): 118.82 lb  BMI (Calculated): 26.9  BMI Grade: 25 - 29.9 - overweight       Lab/Procedures/Meds    Pertinent Labs Reviewed: reviewed  Pertinent Labs Comments: Na 131, BUN 66, Creatinine 1.7, glucose 121, Phos 4.7, Mg 2.7  Pertinent Medications Reviewed: reviewed    Physical Findings/Assessment    N/A    Estimated/Assessed Needs    Weight Used For Calorie Calculations: 73.3 kg (161 lb 9.6 oz)  Energy Calorie Requirements (kcal): 1755  Energy Need Method: [place]-St Jeor(1.25 AF)  Protein Requirements:   Weight Used For Protein Calculations: 73.3 kg (161 lb 9.6 oz)        Fluid: Per MD         Nutrition Prescription Ordered    Current Diet Order: Cardiac, 1500ml fluid " restriction    Evaluation of Received Nutrient/Fluid Intake       % Intake of Estimated Energy Needs: 0 - 25 %  % Meal Intake: 0 - 25 %    Nutrition Risk    Level of Risk/Frequency of Follow-up: (2x weekly)     Assessment and Plan    Nutrition Problem  Inadequate energy intake      Related to (etiology):   Decreased appetite, nausea, pain     Signs and Symptoms (as evidenced by):   PO intake of 25 % today      Interventions/Recommendations (treatment strategy):  Commercial beverage, collaboration with other providers    Nutrition Diagnosis Status:   New         Monitor and Evaluation    Food and Nutrient Intake: energy intake, food and beverage intake  Food and Nutrient Adminstration: diet order  Knowledge/Beliefs/Attitudes: food and nutrition knowledge/skill  Anthropometric Measurements: weight  Biochemical Data, Medical Tests and Procedures: electrolyte and renal panel, glucose/endocrine profile  Nutrition-Focused Physical Findings: overall appearance     Malnutrition Assessment         No significant subcutaneous fat depletion or muscle wasting noted.      Nutrition Follow-Up    RD Follow-up?: Yes     Love Leal RD

## 2019-12-13 NOTE — ASSESSMENT & PLAN NOTE
STEMI   S/p PCI with JOSE to proximal LAD (See operative Note)  Continue ASA/Statin/BB/Plavix  Topical nitrites;  Supplemental oxygen;   Consider CPAP for desaturations  Pain relief measures: hydrocodone and Prn morphine  Continue as per Cardiology;  Trend troponin   12/8/19-  Continue ASA, Plavix , high intensity statin, BB and ACEI   12/9/19-  Persistent ST elevation is noted ant leads suggestive of apical aneurysm and poor LV recovery   Continue ASA, Plavix, Statin, BB, ACEI   12/10/19-  As above   12/11/19-  Asa, Plavix , Ranolazine ,Statin   BB/ACEI as BP permits   Life vest upon discharge , EF 25-30%   12/12/19-  Down trend troponin is noted   Continue ASA, Plavix , high intensity statin   Continue BB, ACEI as BP permits   12/13/19-  As above

## 2019-12-13 NOTE — PLAN OF CARE
called to rolan Bautista RN to place pt on BIPAP. Family stated pt just had a shot for Pain. PT gets labored every time hes in pain.. Educated family on indication and goals.  And told patient to try and keep on for 2 hrs.

## 2019-12-13 NOTE — ASSESSMENT & PLAN NOTE
75 y/o male woth PMHx for CADHx of PCI, HTN, HLP presented to [facility] ER with anterior MI and tx with TNK. Pt transferred to [facility]. Pt initially reperfused clinically but later on with CP.  Pt taken to the cath lab for postinfarct angina.    12/6/19  -s/p PCI of LAD per Dr. Alcazar  -ECHO pending  -Continue ASA, Statin, BB, ACEi, Plavix  -Check FLP  -Keep in ICU for today  -Dash diet, 2 gm sodium restriction  -1.5L Fluid restriction  -Repeat labs in AM    12/7/19  -Echo revealed EF 15-20%, +WMA's, PHTN, DD  -Add Tridil gtt today  -Stop ACEi today given need for Tridil gtt for symptom management   -Continue ASA, Statin, Plavix, BB, IV lasix BID, Tridil gtt  -Further recs to follow pending clinical course    12/8/19  -Continue ASA, Statin, Plavix, IV lasix BID, IV lopressor q6H  -Add ramipril if BP can tolerate  -keep in ICU for close monitoring given clinical condition with little/no improvement    12/9/19  -No chest pain overnight, SOB slowly improving  -Continue ASA, statin, Plavix, IV Lasix, IV lopressor  -Hold ACEi if needed given borderline BP  -Keep in ICU    12/10/19  -Slowly progressing post MI  -Continue ASA, statin, Plavix  -Continue other meds as BP permits  -Will further optimize as clinical condition permits  -Will consider low dose dobutamine, if needed but would like to try to avoid if possible given risk of recurrent aflutter/arrhythmias     12/11/19  -Continues to progress   -Continue ASA, statin, Plavix  -Continue BB and ACEi as BP permits  -Ranexa 500 mg BID added  -OOB and ambulate today if possible    12/12/19  -Chest pain free overnight  -Continue ASA, statin, Plavix  -Add low dose Toprol XL   -Continue Ranexa  -Will add ACEi/ARB once BP permits    12/13/19  -NO chest pain O/N  -Continue ASA, Statin, Plavix, BB, Ranexa  -May add ACEi/ARB tomorrow pending clinical course

## 2019-12-13 NOTE — SUBJECTIVE & OBJECTIVE
Interval History:   This morning during visit pt is noted to have upper abdominal discomfort which he describes as cramp , c/o heartburn , nausea , decreased appetite . States smelling food makes him nauseated. Denies chest pain or worsening of SOB. Not finding a comfortable position in the bed and prefers to sit in the chair.     GI cocktail and Bentyl 10 mg IV given with mild improvement of symptoms.  X-Ray KUB and U/S abd order is placed.   EKG and Troponin I ordered -  EKG - no new changes and troponin is down trend     Cardiology visited pt this morning.     Review of Systems   Constitutional: Positive for activity change and appetite change. Negative for fever.   HENT: Negative for sore throat.    Eyes: Negative for visual disturbance.   Respiratory: Positive for shortness of breath. Negative for cough and chest tightness.    Cardiovascular: Negative for chest pain, palpitations and leg swelling.   Gastrointestinal: Positive for abdominal distention, abdominal pain (upper abd ), nausea and vomiting. Negative for constipation and diarrhea.   Endocrine: Negative for polyuria.   Genitourinary: Negative for decreased urine volume, dysuria, flank pain, frequency and hematuria.   Musculoskeletal: Negative for back pain and gait problem.   Skin: Negative for rash.   Neurological: Negative for syncope, speech difficulty, weakness, light-headedness and headaches.   Psychiatric/Behavioral: Positive for agitation and sleep disturbance. Negative for confusion and hallucinations.     Objective:     Vital Signs (Most Recent):  Temp: 97.6 °F (36.4 °C) (12/13/19 0805)  Pulse: 106 (12/13/19 1130)  Resp: (!) 33 (12/13/19 1130)  BP: (!) 106/54 (12/13/19 1130)  SpO2: (!) 91 % (12/13/19 1130) Vital Signs (24h Range):  Temp:  [97.5 °F (36.4 °C)-97.9 °F (36.6 °C)] 97.6 °F (36.4 °C)  Pulse:  [] 106  Resp:  [15-33] 33  SpO2:  [91 %-97 %] 91 %  BP: ()/(54-71) 106/54     Weight: 73.3 kg (161 lb 9.6 oz)  Body mass index is  26.89 kg/m².    Intake/Output Summary (Last 24 hours) at 12/13/2019 1310  Last data filed at 12/13/2019 0600  Gross per 24 hour   Intake 500 ml   Output 245 ml   Net 255 ml      Physical Exam   Constitutional: He is oriented to person, place, and time. He appears well-developed and well-nourished. No distress.   HENT:   Head: Normocephalic and atraumatic.   Mouth/Throat: No oropharyngeal exudate.   Eyes: Pupils are equal, round, and reactive to light. Conjunctivae and EOM are normal.   Neck: Normal range of motion. Neck supple. No JVD present. No thyromegaly present.   Cardiovascular: Normal rate, regular rhythm and normal heart sounds.   No murmur heard.  Pulmonary/Chest: Effort normal and breath sounds normal. No respiratory distress. He has no wheezes. He has no rales. He exhibits no tenderness.   NC in place   No wheezes or rales    Abdominal: Soft. Bowel sounds are normal. He exhibits no distension. There is tenderness (Generalized tenderness more in the epigastric and zunilda upper quadrants ). There is no rebound and no guarding.   Musculoskeletal: Normal range of motion. He exhibits no edema.   Lymphadenopathy:     He has no cervical adenopathy.   Neurological: He is alert and oriented to person, place, and time. He displays normal reflexes. No cranial nerve deficit or sensory deficit.   Skin: Skin is warm and dry. No rash noted. He is not diaphoretic.   Psychiatric: He has a normal mood and affect.       Significant Labs:   CBC:   Recent Labs   Lab 12/12/19  0416   WBC 10.92   HGB 14.5   HCT 44.6        CMP:   Recent Labs   Lab 12/12/19  0416 12/12/19  1244 12/13/19  0420   *  --  131*   K 4.3 4.6 4.7   CL 94*  --  92*   CO2 29  --  25     --  121*   BUN 48*  --  66*   CREATININE 1.3  --  1.7*   CALCIUM 9.0  --  8.9   PROT 6.7  --   --    ALBUMIN 2.7*  --   --    BILITOT 1.4*  --   --    ALKPHOS 90  --   --    AST 52*  --   --    ALT 67*  --   --    ANIONGAP 11  --  14   EGFRNONAA 54*  --   39*       Significant Imaging:

## 2019-12-13 NOTE — PROGRESS NOTES
[facility] Hospital Medicine  Progress Note    Patient Name: Raleigh Walsh  MRN: [id number]  Patient Class: IP- Inpatient   Admission Date: 12/5/2019  Length of Stay: 8 days  Attending Physician: Gilmar Florence MD  Primary Care Provider: Akhil Smith MD        Subjective:     Principal Problem:STEMI (ST elevation myocardial infarction)        HPI:  73 YO WM with known CAD S/P MI and prior stenting;  Presented to ED at [facility] after experiencing chest pain while at a ballgame; He ruled in foe a STEMI; was treated with TPA and transferred here; He was taken to the cath lab where he underwent PCI with stent placed to the proximal LAD by Dr. Alcazar.  He was transferred to the ICU in stable condition.    Overview/Hospital Course:  12/7/19-  Pt c/o new onset SOB on minimal exertion . Denies chest pain.   Remains on O2 at 5L/min. Pt is noted to be tachycardic and tachypnic.   Episodes of nausea and emesis x 3 yesterday but none today   EKG revealed sinus tachycardia , ST elevation ant and lat leads and no longer PVC's   Echo revealed severely depressed left vent systolic function with EF of 15-20%, PA pressure 43.   Cardiology is recommending diuresis with IV furosemide , continue BB and start Tridil drip.   Prognosis is guarded at this time.   12/8/19-  Pt is seen at bedside . Remains mildly tachycardic and tachypnic .  Reports SOB is better today . Good urine output noted with diuresis 1.5 L yesterday   Remains on O2 at 5L/min. Tolerated BiPAP well last night.     Tridil drip discontinued yesterday due to hypotension   Remains on low dose BB and Ramipril restarted at 2.5 mg daily   Prognosis guarded.     12/9/19-  Overnight developed Atrial flutter and started on Amiodarone ggt and Heparin ggt. Amiodarone bolus was not given due to marginal BP.   On BB . Rate is fairly controlled.  EKG revealed persistent ST elevation anterior leads     Remains on O2 at 5 L/min via NC and nightly BiPAP  Good  urine output 1.3 L yesterday   Leukocytosis has resolved . Blood cultures are negative     Pt denies chest pain . Reports SOB is better at rest     12/10/19-  Pt feels fair. Denies chest pain though reports some chest tightness today . This is not like when he had MI. Reports SOB is better at rest. Has not moved from bed yet. Wears BiPAP at night. Now on NC at 4L/min.   Converted to sinus rhythm noted in EKG today . Persistent mild ST elevation noted ant leads   Amiodarone switched to pill  Remains on Heparin drip  On Lasix for diuresis . Urine output is good.  Renal indices are stable   Leukocytosis has resolved   12/11/19-  Remains on O2 at 4L/min.  Nightly BiPAP  Still c/o some SOB . Intermittent A.fib overnight and  had an episode of chest discomfort last night after eating ice cream  per nursing staff.  This morning pt denies chest pain but endorses SOB . Good diuresis is noted.   On Oral amiodarone . Started on NOAC by Cardiology today.  BB as BP permits   Lasix dose adjusted to IV daily per Cardiology   On Ranolazine   PT is seeing pt and is able to participate    12/12/19-  Pt is sitting in the bedside chair . Still C/O SOB with exertion though thinks it is getting better .  Episode of nausea and vomiting shortly after taking morning meds required a dose of antiemetic   Felt better shortly after giving Zofran. Appetite is fair   Performance with PT is improving   Remains on O2 at 4L/min   Good diuresis is noted   Pt denies chest pain   12/13/19-  Remains on O2 at 4L/min  Per Nursing note ---Intermittent nausea and vomiting throughout the night. Mild improvement with PRN medications. Complaints of heartburn and feeling bloated. BM attempted multiple times.  This morning during visit pt is noted to have upper abdominal discomfort which he describes as cramp , c/o heartburn , nausea , decreased appetite . States smelling food makes him nauseated. Denies chest pain or worsening of SOB. Not finding a comfortable  position in the bed and prefers to sit in the chair.     GI cocktail and Bentyl 10 mg IV given with mild improvement of symptoms.  X-Ray KUB and U/S abd order is placed.   EKG and Troponin I ordered -  EKG - no new changes and troponin is down trend     Cardiology visited pt this morning.     Interval History:   This morning during visit pt is noted to have upper abdominal discomfort which he describes as cramp , c/o heartburn , nausea , decreased appetite . States smelling food makes him nauseated. Denies chest pain or worsening of SOB. Not finding a comfortable position in the bed and prefers to sit in the chair.     GI cocktail and Bentyl 10 mg IV given with mild improvement of symptoms.  X-Ray KUB and U/S abd order is placed.   EKG and Troponin I ordered -  EKG - no new changes and troponin is down trend     Cardiology visited pt this morning.     Review of Systems   Constitutional: Positive for activity change and appetite change. Negative for fever.   HENT: Negative for sore throat.    Eyes: Negative for visual disturbance.   Respiratory: Positive for shortness of breath. Negative for cough and chest tightness.    Cardiovascular: Negative for chest pain, palpitations and leg swelling.   Gastrointestinal: Positive for abdominal distention, abdominal pain (upper abd ), nausea and vomiting. Negative for constipation and diarrhea.   Endocrine: Negative for polyuria.   Genitourinary: Negative for decreased urine volume, dysuria, flank pain, frequency and hematuria.   Musculoskeletal: Negative for back pain and gait problem.   Skin: Negative for rash.   Neurological: Negative for syncope, speech difficulty, weakness, light-headedness and headaches.   Psychiatric/Behavioral: Positive for agitation and sleep disturbance. Negative for confusion and hallucinations.     Objective:     Vital Signs (Most Recent):  Temp: 97.6 °F (36.4 °C) (12/13/19 0805)  Pulse: 106 (12/13/19 1130)  Resp: (!) 33 (12/13/19 1130)  BP: (!)  106/54 (12/13/19 1130)  SpO2: (!) 91 % (12/13/19 1130) Vital Signs (24h Range):  Temp:  [97.5 °F (36.4 °C)-97.9 °F (36.6 °C)] 97.6 °F (36.4 °C)  Pulse:  [] 106  Resp:  [15-33] 33  SpO2:  [91 %-97 %] 91 %  BP: ()/(54-71) 106/54     Weight: 73.3 kg (161 lb 9.6 oz)  Body mass index is 26.89 kg/m².    Intake/Output Summary (Last 24 hours) at 12/13/2019 1310  Last data filed at 12/13/2019 0600  Gross per 24 hour   Intake 500 ml   Output 245 ml   Net 255 ml      Physical Exam   Constitutional: He is oriented to person, place, and time. He appears well-developed and well-nourished. No distress.   HENT:   Head: Normocephalic and atraumatic.   Mouth/Throat: No oropharyngeal exudate.   Eyes: Pupils are equal, round, and reactive to light. Conjunctivae and EOM are normal.   Neck: Normal range of motion. Neck supple. No JVD present. No thyromegaly present.   Cardiovascular: Normal rate, regular rhythm and normal heart sounds.   No murmur heard.  Pulmonary/Chest: Effort normal and breath sounds normal. No respiratory distress. He has no wheezes. He has no rales. He exhibits no tenderness.   NC in place   No wheezes or rales    Abdominal: Soft. Bowel sounds are normal. He exhibits no distension. There is tenderness (Generalized tenderness more in the epigastric and zunilda upper quadrants ). There is no rebound and no guarding.   Musculoskeletal: Normal range of motion. He exhibits no edema.   Lymphadenopathy:     He has no cervical adenopathy.   Neurological: He is alert and oriented to person, place, and time. He displays normal reflexes. No cranial nerve deficit or sensory deficit.   Skin: Skin is warm and dry. No rash noted. He is not diaphoretic.   Psychiatric: He has a normal mood and affect.       Significant Labs:   CBC:   Recent Labs   Lab 12/12/19  0416   WBC 10.92   HGB 14.5   HCT 44.6        CMP:   Recent Labs   Lab 12/12/19  0416 12/12/19  1244 12/13/19  0420   *  --  131*   K 4.3 4.6 4.7   CL  94*  --  92*   CO2 29  --  25     --  121*   BUN 48*  --  66*   CREATININE 1.3  --  1.7*   CALCIUM 9.0  --  8.9   PROT 6.7  --   --    ALBUMIN 2.7*  --   --    BILITOT 1.4*  --   --    ALKPHOS 90  --   --    AST 52*  --   --    ALT 67*  --   --    ANIONGAP 11  --  14   EGFRNONAA 54*  --  39*       Significant Imaging:       Assessment/Plan:      * STEMI (ST elevation myocardial infarction)  STEMI   S/p PCI with JOSE to proximal LAD (See operative Note)  Continue ASA/Statin/BB/Plavix  Topical nitrites;  Supplemental oxygen;   Consider CPAP for desaturations  Pain relief measures: hydrocodone and Prn morphine  Continue as per Cardiology;  Trend troponin   12/8/19-  Continue ASA, Plavix , high intensity statin, BB and ACEI   12/9/19-  Persistent ST elevation is noted ant leads suggestive of apical aneurysm and poor LV recovery   Continue ASA, Plavix, Statin, BB, ACEI   12/10/19-  As above   12/11/19-  Asa, Plavix , Ranolazine ,Statin   BB/ACEI as BP permits   Life vest upon discharge , EF 25-30%   12/12/19-  Down trend troponin is noted   Continue ASA, Plavix , high intensity statin   Continue BB, ACEI as BP permits   12/13/19-  As above     Dyspepsia, drug induced vs ulcer dyspepsia   - Change H2 blocker to PPI  -GI cocktail X1  -Get U/S abdomen   - Gary S/L for cramp as needed   -Simethicone as needed for bloating   -Continue Laxatives / stool softener       PENNY (acute kidney injury)  -Baseline creatinine 1.1 to 1.2  -Monitor Renal indices while on diuretic therapy   -Stable at 1.5 to 1.7   -Stable   12/13/19-  Elevated serum creatinine is noted today 1.3> 1.7 . BUN is elevated suggestive of pre renal .   Lasix  Held today   Reassess tomorrow       Elevated LFTs  -Likely hepatic congestion due to acute CHF.   -Continue Diuresis   -Improving with diuresis   -Resolving     Paroxysmal VT  - Likely reperfusion arrhythmia   -continue BB  12/11/19-  No further VT noted in the last 72h     Acute systolic congestive  heart failure, NYHA class 3  - New onset Post -MI   -Continue Diuresis   -Continue BB  -ACEI is discontinued by Cardiology due to hypotension or marginal BP   -Close monitoring in ICU   12/8/19-  Good diuresis is noted with IV Furosemide   Started back on ACEI   Continue BB  12/9/19-  Continue diuresis , BB, ACEI   12/10/19-  Clinically pt seems to be compensating   Continue diuresis .  BB and ACEI as BP permits   12/11/19-  Continue Diuresis   BB and ACEI as BP permits   BP remains marginal   EF 25-30%. Life vest on discharge   12/12/19   Lasix switched oral today   BB switch to oral today   Continue ACEI as BP permits   BP remains marginal   Life vest on discharge   12/13/19-  Cardiology held lasix today due to bump in creatinine   Remains on O2 at 4L/min.   On exam pt appears near euvolemic     Atrial flutter, New- onset   12/9/19  - New onset Atrial flutter  Post MI/ Post PCI   -Started on Amiodarone ggt and Heparin ggt  -Continue BB  -Continue ACEI  12/10/19-  Converted to sinus rhythm   Switched to oral Amiodarone   Remains on heparin ggt   12/11/19-  Started on NOAC  On Amiodarone oral   BB   12/12/19-  As above        Leukocytosis, likely reactive   - Likely reactive   -Procalcitonin is marginally elevated   -CXR- 12/8/19- bilateral interstitial infiltrate likely  due to pul congestion related to acute CHF   -Get Blood cultures x 2  -Get UA   -Monitor   -Resolved - 12/9/19      YESENIA (obstructive sleep apnea)  -Encouraged nightly C-PAP/ BiPAP      Mixed hyperlipidemia          VTE Risk Mitigation (From admission, onward)         Ordered     apixaban tablet 5 mg  2 times daily      12/11/19 0986                Critical care time spent on the evaluation and treatment of severe organ dysfunction, review of pertinent labs and imaging studies, discussions with consulting providers and discussions with patient/family: 40  minutes.      Gilmar Florence MD  Department of Hospital Medicine   [facility]

## 2019-12-13 NOTE — PLAN OF CARE
Met with patient and family to complete discharge reassessment.       12/13/19 1040   Discharge Reassessment   Assessment Type Discharge Planning Reassessment   Provided patient/caregiver education on the expected discharge date and the discharge plan Yes   Do you have any problems affording any of your prescribed medications? TBD   Discharge Plan A Home with family;Home Health   DME Needed Upon Discharge  other (see comments)  (tbd)   Patient choice form signed by patient/caregiver N/A   Anticipated Discharge Disposition Home-Health   Can the patient answer the patient profile reliably? Yes, cognitively intact   How does the patient rate their overall health at the present time? Fair   Describe the patient's ability to walk at the present time. Major restrictions/daily assistance from another person   How often would a person be available to care for the patient? Often   Number of comorbid conditions (as recorded on the chart) Five or more

## 2019-12-13 NOTE — PLAN OF CARE
Recommendation: 1. Add Boost Breeze TID. 2. Continue diet as tolerated. 3. RD to follow   Goals: >85% EEN/EPN by RD f/u  Nutrition Goal Status: new  Communication of RD Recs: (POC, sticky note)

## 2019-12-13 NOTE — SUBJECTIVE & OBJECTIVE
Interval History: no acute events noted o/n. Complains of abdominal discomfort today on exam. No chest pain or SOB today.     Review of Systems   Constitution: Positive for malaise/fatigue. Negative for diaphoresis.   HENT: Negative for hearing loss and hoarse voice.    Eyes: Negative for blurred vision and visual disturbance.   Cardiovascular: Negative for chest pain, claudication, dyspnea on exertion, irregular heartbeat, leg swelling, near-syncope, orthopnea, palpitations, paroxysmal nocturnal dyspnea and syncope.   Respiratory: Positive for snoring. Negative for cough, hemoptysis, shortness of breath, sleep disturbances due to breathing and wheezing.    Endocrine: Negative for cold intolerance and heat intolerance.   Hematologic/Lymphatic: Bruises/bleeds easily.   Skin: Negative for color change, dry skin and nail changes.   Musculoskeletal: Positive for arthritis and back pain. Negative for joint pain and myalgias.   Gastrointestinal: Positive for abdominal pain. Negative for bloating, constipation, nausea and vomiting.   Genitourinary: Negative for dysuria, flank pain, hematuria and hesitancy.   Neurological: Positive for weakness. Negative for headaches, light-headedness, loss of balance, numbness and paresthesias.   Psychiatric/Behavioral: Negative for altered mental status.   Allergic/Immunologic: Negative for environmental allergies.     Objective:     Vital Signs (Most Recent):  Temp: 97.6 °F (36.4 °C) (12/13/19 0805)  Pulse: 93 (12/13/19 0805)  Resp: 20 (12/13/19 0805)  BP: 100/64 (12/13/19 0805)  SpO2: (!) 93 % (12/13/19 0805) Vital Signs (24h Range):  Temp:  [97.5 °F (36.4 °C)-97.9 °F (36.6 °C)] 97.6 °F (36.4 °C)  Pulse:  [28-94] 93  Resp:  [15-20] 20  SpO2:  [90 %-97 %] 93 %  BP: ()/(54-71) 100/64     Weight: 73.3 kg (161 lb 9.6 oz)  Body mass index is 26.89 kg/m².     SpO2: (!) 93 %  O2 Device (Oxygen Therapy): nasal cannula w/ humidification      Intake/Output Summary (Last 24 hours) at  12/13/2019 1002  Last data filed at 12/13/2019 0600  Gross per 24 hour   Intake 500 ml   Output 470 ml   Net 30 ml       Lines/Drains/Airways     Peripherally Inserted Central Catheter Line                 PICC Double Lumen 12/09/19 0820 right brachial 4 days          Peripheral Intravenous Line                 Peripheral IV - Single Lumen 12/05/19 2032 18 G Right Antecubital 7 days                Physical Exam   Constitutional: He is oriented to person, place, and time. He appears well-developed and well-nourished. No distress.   HENT:   Head: Normocephalic and atraumatic.   Eyes: Pupils are equal, round, and reactive to light.   Neck: Normal range of motion and full passive range of motion without pain. Neck supple. No JVD present. No tracheal deviation present. No thyromegaly present.   Cardiovascular: Normal rate, regular rhythm, S1 normal, S2 normal and intact distal pulses.  Occasional extrasystoles are present. PMI is not displaced. Exam reveals no distant heart sounds.   No murmur heard.  Pulses:       Radial pulses are 1+ on the right side, and 1+ on the left side.        Dorsalis pedis pulses are 1+ on the right side, and 1+ on the left side.   Right groin access site C/D/I, no hematoma or ecchymosis noted. No drainage or signs of infection noted.    Pulmonary/Chest: No accessory muscle usage. No respiratory distress. He has decreased breath sounds in the right lower field and the left lower field. He has no wheezes. He has no rales.   +Bipap o/n   Abdominal: Soft. Bowel sounds are normal. He exhibits no distension. There is no tenderness.   +abdominal discomfort   Musculoskeletal: Normal range of motion. He exhibits no edema.        Right ankle: He exhibits no swelling.        Left ankle: He exhibits no swelling.   Neurological: He is alert and oriented to person, place, and time.   Skin: Skin is warm. He is not diaphoretic. No cyanosis. No pallor. Nails show no clubbing.   Psychiatric: He has a normal  mood and affect. His speech is normal and behavior is normal. Judgment and thought content normal. Cognition and memory are normal.   Nursing note and vitals reviewed.      Significant Labs:   BMP:   Recent Labs   Lab 12/12/19  0416 12/12/19  1244 12/13/19  0420     --  121*   *  --  131*   K 4.3 4.6 4.7   CL 94*  --  92*   CO2 29  --  25   BUN 48*  --  66*   CREATININE 1.3  --  1.7*   CALCIUM 9.0  --  8.9   MG 2.8*  --  2.7*   , CBC   Recent Labs   Lab 12/12/19  0416   WBC 10.92   HGB 14.5   HCT 44.6       and All pertinent lab results from the last 24 hours have been reviewed.    Significant Imaging: Echocardiogram:   2D echo with color flow doppler:   Results for orders placed or performed during the hospital encounter of 12/05/19   2D echo with color flow doppler   Result Value Ref Range    QEF 25 (A) 55 - 65    Pericardial Effusion SMALL (A)     Narrative    Date of Procedure: 12/07/2019        TEST DESCRIPTION   Technical Quality: This is a technically adequate study.     Aorta: The aortic root is normal in size.     Left Atrium: The left atrium is normal in size.     Left Ventricle: The left ventricle is normal in size. LV wall thickness is normal. The following segments were akinetic: apical septum, apical lateral wall, apical inferior wall, apical anterior wall.  The following segments were mildly hypokinetic: basal anterior wall.  The following segments were severely hypokinetic: mid inferoseptum, mid anterior wall.  Left ventricular systolic function appears severely depressed. Visually estimated ejection fraction is 25-30%.     Diastolic indices:     Right Atrium: The right atrium is normal in size.     Right Ventricle: The right ventricle is normal in size. Global right ventricular systolic function appears normal.     Aortic Valve:  The aortic valve is normal in structure.     Mitral Valve:  The mitral valve is normal in structure.     Pulmonary Valve:  The pulmonic valve is not  well seen.     Pericardium: There is evidence of a small postero-lateral pericardial effusion.     IVC: IVC is enlarged but collapses > 50% with a sniff, suggesting intermediate right atrial pressure of 8 mmHg.     Intracavitary: There is no evidence of intracavity mass, thrombi, or vegetation.         CONCLUSIONS     1 - Wall motion abnormalities.     2 - Severely depressed left ventricular systolic function (EF 25-30%).     3 - Normal right ventricular systolic function .     4 - Focal small pericardial effusion on right side. No tamponade    5 - Intermediate central venous pressure.     6 - Limited echo study to rule out mechanical rupture.            This document has been electronically    SIGNED BY: Lamont Andres MD On: 12/08/2019 10:28    and X-Ray: CXR: X-Ray Chest 1 View (CXR):   Results for orders placed or performed during the hospital encounter of 12/05/19   X-Ray Chest 1 View    Narrative    EXAMINATION:  XR CHEST 1 VIEW    CLINICAL HISTORY:  PICC placement;    TECHNIQUE:  Single frontal view of the chest was performed.    COMPARISON:  Chest radiograph 12/08/2019 with priors    FINDINGS:  Cardiac leads project over the chest.  Interval placement of a right-sided peripherally inserted central venous catheter with tip projecting in the SVC.  Cardiomediastinal silhouette is within normal limits and stable.  Persistent interstitial infiltrates.  No large consolidative opacity, effusion or pneumothorax.  Osseous structures appear intact.      Impression    Interval placement of a right-sided PICC with tip projecting in the SVC.    Stable bilateral interstitial infiltrates.      Electronically signed by: Henry Martin  Date:    12/09/2019  Time:    09:17    and X-Ray Chest PA and Lateral (CXR): No results found for this visit on 12/05/19.

## 2019-12-13 NOTE — SIGNIFICANT EVENT
The patient was seen and examined in response to a deterioration alert. He recently transferred from the ICU. At the time of exam, the patient was ambulating, trying to relieve gas pain that has been present fever many hours. He was found to be tachypnic and short of breath with speech. Case was discussed with the primary team who plans to discuss management of his pleural effusions with Pulmonology.

## 2019-12-13 NOTE — PROGRESS NOTES
[facility] -   Cardiology  Progress Note    Patient Name: Raleigh Walsh  MRN: [id number]  Admission Date: 12/5/2019  Hospital Length of Stay: 8 days  Code Status: No Order   Attending Physician: Gilmar Florence MD   Primary Care Physician: Akhil Smith MD  Expected Discharge Date: 12/15/2019  Principal Problem:STEMI (ST elevation myocardial infarction)    Subjective:   HPI    73 y/o male woth PMHx for CADHx of PCI, HTN, HLP presented to [facility] ER with anterior MI and tx with TNK. Pt transferred to [facility]. Pt initially reperfused clinically but later on with CP.  Pt taken to the cath lab for postinfarct angina.      Hospital Course:   12/6/19--Patient seen and examined in room, lying in bed. Continues to complain of intermittent chest discomfort today but much improved since PCI. He was noted to have 8-10 beats of VT on monitor at time of exam this AM. Keep in ICU for now. Labs reviewed, K+ 4.8, Cr 1.4, Troponin >50.     12/7/19--Patient seen and examined in ICU, lying in bed. Had multiple episodes of shortness of breath, palpitations with diaphoresis noted o/n. HR at time of exam 110s. Will optimize medical regimen for CAD/CHF today. Discussed with patient and family at bedside given guarded prognosis given ICM post STEMI. Will add Tridil gtt today and reassess response.     12/8/19--Patient seen and examined in ICU today. Reports continued shortness of breath today but slightly/marginally improved from yesterday. HR remains elevated today in 110-120s, EKG revealed ST with PACs today. Patient unable to tolerate Tridil gtt yesterday due to hypotension. Will try to optimize medical regimen today but low BP and elevated HR makes medical therapy challenging. Labs reviewed, K+ 4.3, Cr 1.7, BUN 42. Repeat CXR ordered.     12/9/19-Patient seen and examined today. Still SOB and fatigued, some improvement overnight. Denies chest pain. Converted to aflutter this AM, amiodarone drip initiated without  bolus. Labs reviewed. Creatinine 1.5, liver enzymes improving. Troponin trending down. BP remains marginal.     12/10/19-Patient seen and examined today. Appears slightly stronger, more talkative. Still endorses SOB. No sharan chest pain or tightness. Converted to SR overnight. Labs reviewed. Creatinine stable.     12/11/19-Patient seen and examined today, sitting up in chair. Slowly progressing. SOB improved. Did admit to some mild chest tightness overnight, has since resolved. Intermittent afib noted as well. Labs reviewed, K slightly low at 3.3. BP stable.     12/12/19-Patient seen and examined today, sitting up in chair. Ambulated around ICU unit this AM. Admits to fatigue/malaise. SOB improving. No chest pain/tightness overnight. Labs reviewed and are stable. BP borderline.    12/13/19--Patient seen and examined in ICU today, complains of abdominal discomfort today. No chest pain or SOB today. Labs reviewed, bump in creatinine to 1.7 today. Hold Lasix today and resume tomorrow PO Lasix 20 or 40 mg daily.     Interval History: no acute events noted o/n. Complains of abdominal discomfort today on exam. No chest pain or SOB today.     Review of Systems   Constitution: Positive for malaise/fatigue. Negative for diaphoresis.   HENT: Negative for hearing loss and hoarse voice.    Eyes: Negative for blurred vision and visual disturbance.   Cardiovascular: Negative for chest pain, claudication, dyspnea on exertion, irregular heartbeat, leg swelling, near-syncope, orthopnea, palpitations, paroxysmal nocturnal dyspnea and syncope.   Respiratory: Positive for snoring. Negative for cough, hemoptysis, shortness of breath, sleep disturbances due to breathing and wheezing.    Endocrine: Negative for cold intolerance and heat intolerance.   Hematologic/Lymphatic: Bruises/bleeds easily.   Skin: Negative for color change, dry skin and nail changes.   Musculoskeletal: Positive for arthritis and back pain. Negative for joint pain  and myalgias.   Gastrointestinal: Positive for abdominal pain. Negative for bloating, constipation, nausea and vomiting.   Genitourinary: Negative for dysuria, flank pain, hematuria and hesitancy.   Neurological: Positive for weakness. Negative for headaches, light-headedness, loss of balance, numbness and paresthesias.   Psychiatric/Behavioral: Negative for altered mental status.   Allergic/Immunologic: Negative for environmental allergies.     Objective:     Vital Signs (Most Recent):  Temp: 97.6 °F (36.4 °C) (12/13/19 0805)  Pulse: 93 (12/13/19 0805)  Resp: 20 (12/13/19 0805)  BP: 100/64 (12/13/19 0805)  SpO2: (!) 93 % (12/13/19 0805) Vital Signs (24h Range):  Temp:  [97.5 °F (36.4 °C)-97.9 °F (36.6 °C)] 97.6 °F (36.4 °C)  Pulse:  [28-94] 93  Resp:  [15-20] 20  SpO2:  [90 %-97 %] 93 %  BP: ()/(54-71) 100/64     Weight: 73.3 kg (161 lb 9.6 oz)  Body mass index is 26.89 kg/m².     SpO2: (!) 93 %  O2 Device (Oxygen Therapy): nasal cannula w/ humidification      Intake/Output Summary (Last 24 hours) at 12/13/2019 1002  Last data filed at 12/13/2019 0600  Gross per 24 hour   Intake 500 ml   Output 470 ml   Net 30 ml       Lines/Drains/Airways     Peripherally Inserted Central Catheter Line                 PICC Double Lumen 12/09/19 0820 right brachial 4 days          Peripheral Intravenous Line                 Peripheral IV - Single Lumen 12/05/19 2032 18 G Right Antecubital 7 days                Physical Exam   Constitutional: He is oriented to person, place, and time. He appears well-developed and well-nourished. No distress.   HENT:   Head: Normocephalic and atraumatic.   Eyes: Pupils are equal, round, and reactive to light.   Neck: Normal range of motion and full passive range of motion without pain. Neck supple. No JVD present. No tracheal deviation present. No thyromegaly present.   Cardiovascular: Normal rate, regular rhythm, S1 normal, S2 normal and intact distal pulses.  Occasional extrasystoles are  present. PMI is not displaced. Exam reveals no distant heart sounds.   No murmur heard.  Pulses:       Radial pulses are 1+ on the right side, and 1+ on the left side.        Dorsalis pedis pulses are 1+ on the right side, and 1+ on the left side.   Right groin access site C/D/I, no hematoma or ecchymosis noted. No drainage or signs of infection noted.    Pulmonary/Chest: No accessory muscle usage. No respiratory distress. He has decreased breath sounds in the right lower field and the left lower field. He has no wheezes. He has no rales.   +Bipap o/n   Abdominal: Soft. Bowel sounds are normal. He exhibits no distension. There is no tenderness.   +abdominal discomfort   Musculoskeletal: Normal range of motion. He exhibits no edema.        Right ankle: He exhibits no swelling.        Left ankle: He exhibits no swelling.   Neurological: He is alert and oriented to person, place, and time.   Skin: Skin is warm. He is not diaphoretic. No cyanosis. No pallor. Nails show no clubbing.   Psychiatric: He has a normal mood and affect. His speech is normal and behavior is normal. Judgment and thought content normal. Cognition and memory are normal.   Nursing note and vitals reviewed.      Significant Labs:   BMP:   Recent Labs   Lab 12/12/19  0416 12/12/19  1244 12/13/19  0420     --  121*   *  --  131*   K 4.3 4.6 4.7   CL 94*  --  92*   CO2 29  --  25   BUN 48*  --  66*   CREATININE 1.3  --  1.7*   CALCIUM 9.0  --  8.9   MG 2.8*  --  2.7*   , CBC   Recent Labs   Lab 12/12/19  0416   WBC 10.92   HGB 14.5   HCT 44.6       and All pertinent lab results from the last 24 hours have been reviewed.    Significant Imaging: Echocardiogram:   2D echo with color flow doppler:   Results for orders placed or performed during the hospital encounter of 12/05/19   2D echo with color flow doppler   Result Value Ref Range    QEF 25 (A) 55 - 65    Pericardial Effusion SMALL (A)     Narrative    Date of Procedure:  12/07/2019        TEST DESCRIPTION   Technical Quality: This is a technically adequate study.     Aorta: The aortic root is normal in size.     Left Atrium: The left atrium is normal in size.     Left Ventricle: The left ventricle is normal in size. LV wall thickness is normal. The following segments were akinetic: apical septum, apical lateral wall, apical inferior wall, apical anterior wall.  The following segments were mildly hypokinetic: basal anterior wall.  The following segments were severely hypokinetic: mid inferoseptum, mid anterior wall.  Left ventricular systolic function appears severely depressed. Visually estimated ejection fraction is 25-30%.     Diastolic indices:     Right Atrium: The right atrium is normal in size.     Right Ventricle: The right ventricle is normal in size. Global right ventricular systolic function appears normal.     Aortic Valve:  The aortic valve is normal in structure.     Mitral Valve:  The mitral valve is normal in structure.     Pulmonary Valve:  The pulmonic valve is not well seen.     Pericardium: There is evidence of a small postero-lateral pericardial effusion.     IVC: IVC is enlarged but collapses > 50% with a sniff, suggesting intermediate right atrial pressure of 8 mmHg.     Intracavitary: There is no evidence of intracavity mass, thrombi, or vegetation.         CONCLUSIONS     1 - Wall motion abnormalities.     2 - Severely depressed left ventricular systolic function (EF 25-30%).     3 - Normal right ventricular systolic function .     4 - Focal small pericardial effusion on right side. No tamponade    5 - Intermediate central venous pressure.     6 - Limited echo study to rule out mechanical rupture.            This document has been electronically    SIGNED BY: Lamont Andres MD On: 12/08/2019 10:28    and X-Ray: CXR: X-Ray Chest 1 View (CXR):   Results for orders placed or performed during the hospital encounter of 12/05/19   X-Ray Chest 1 View    Narrative     EXAMINATION:  XR CHEST 1 VIEW    CLINICAL HISTORY:  PICC placement;    TECHNIQUE:  Single frontal view of the chest was performed.    COMPARISON:  Chest radiograph 12/08/2019 with priors    FINDINGS:  Cardiac leads project over the chest.  Interval placement of a right-sided peripherally inserted central venous catheter with tip projecting in the SVC.  Cardiomediastinal silhouette is within normal limits and stable.  Persistent interstitial infiltrates.  No large consolidative opacity, effusion or pneumothorax.  Osseous structures appear intact.      Impression    Interval placement of a right-sided PICC with tip projecting in the SVC.    Stable bilateral interstitial infiltrates.      Electronically signed by: Henry Martin  Date:    12/09/2019  Time:    09:17    and X-Ray Chest PA and Lateral (CXR): No results found for this visit on 12/05/19.    Assessment and Plan:       * STEMI (ST elevation myocardial infarction)  73 y/o male woth PMHx for CADHx of PCI, HTN, HLP presented to [facility] ER with anterior MI and tx with TNK. Pt transferred to [facility]. Pt initially reperfused clinically but later on with CP.  Pt taken to the cath lab for postinfarct angina.    12/6/19  -s/p PCI of LAD per Dr. Alcazar  -ECHO pending  -Continue ASA, Statin, BB, ACEi, Plavix  -Check FLP  -Keep in ICU for today  -Dash diet, 2 gm sodium restriction  -1.5L Fluid restriction  -Repeat labs in AM    12/7/19  -Echo revealed EF 15-20%, +WMA's, PHTN, DD  -Add Tridil gtt today  -Stop ACEi today given need for Tridil gtt for symptom management   -Continue ASA, Statin, Plavix, BB, IV lasix BID, Tridil gtt  -Further recs to follow pending clinical course    12/8/19  -Continue ASA, Statin, Plavix, IV lasix BID, IV lopressor q6H  -Add ramipril if BP can tolerate  -keep in ICU for close monitoring given clinical condition with little/no improvement    12/9/19  -No chest pain overnight, SOB slowly improving  -Continue ASA, statin, Plavix, IV Lasix, IV  lopressor  -Hold ACEi if needed given borderline BP  -Keep in ICU    12/10/19  -Slowly progressing post MI  -Continue ASA, statin, Plavix  -Continue other meds as BP permits  -Will further optimize as clinical condition permits  -Will consider low dose dobutamine, if needed but would like to try to avoid if possible given risk of recurrent aflutter/arrhythmias     12/11/19  -Continues to progress   -Continue ASA, statin, Plavix  -Continue BB and ACEi as BP permits  -Ranexa 500 mg BID added  -OOB and ambulate today if possible    12/12/19  -Chest pain free overnight  -Continue ASA, statin, Plavix  -Add low dose Toprol XL   -Continue Ranexa  -Will add ACEi/ARB once BP permits    12/13/19  -NO chest pain O/N  -Continue ASA, Statin, Plavix, BB, Ranexa  -May add ACEi/ARB tomorrow pending clinical course    Atrial flutter, New- onset   -HR controlled at time of exam  -Continue amio gtt  -Continue BB as BP permits  -Continue heparin gtt    12/10/19  -Converted to SR this AM  -Switch amiodarone gtt to po  -No BB given borderline BP  -Continue heparin gtt for now; may not need long term AC given brief duration of arrhythmia; will re-evaluate    12/11/19  -Intermittent afib noted overnight    12/12/19  -SR at time of exam  -Add low dose BB  -Continue Eliquis for CVA prophylaxis     12/13/19  -Continue BB, Eliquis    Leukocytosis, likely reactive   -Mgmt per primary team    PENNY (acute kidney injury)  -Creatinine stable, monitor  -Repeat BMP in AM    Acute systolic congestive heart failure, NYHA class 3  -EF 15-20%, +WMA, PHTN  -Continue IV lasix BID, IV lopressor Q6H  -Use Ramipril if BP can tolerate  -Dash diet, 2 gm sodium restriction  -1200 ml fluid restriction  -Daily weights  -Strict intake and output  -Avoid exertional activities  -CXR stable  -Reassess in AM    12/9/19  -Slowly improving  -Continue IV diuresis  -Continue BB and ACEi as BP permits  -Optimization of medical therapy challenging given hypotension      12/10/19  -Appears slightly improved today  -BP marginal  -Hold BB, ACEi, and Lasix for now  -Will further optimize medical therapy as clinical condition permits  -Will consider low dose dobutamine, if needed  -LifeVest upon d/c home for SCD prevention    12/11/19  -Slowly progressing  -Decrease IV Lasix to 40 mg daily  -Continue BB and ACEi as BP permits  -Add Ranexa 500 mg BID  -Will further optimize regimen as clinical condition permits    12/12/19  -Continues to make progress  -Switch to po Lasix tmw  -Add low dose Toprol XL  -Continue Ranexa  -Will add ACEi/ARB once BP permits    12/13/19  -LifeVest prior to discharge  -Hold Lasix today given bump in renal fn  -Continue Toprol XL, Ranexa  -Add ACEi/ARB once BP, renal fn allows    YESENIA (obstructive sleep apnea)  -continue nightly cpap    Paroxysmal VT  Continue BB  Keep K+ >4 and Mag >2  Continue telemetry monitoring  Keep in ICU for now    12/7  -continue BB    12/8  -keep K+>4 and Mag>2  -Continue BB as tolerated    12/9/19  -Keep electrolytes WNL  -Continue BB as BP permits    12/10/19  -See plan above  -LifeVest for SCD prevention    Mixed hyperlipidemia  -Continue statin  -Check FLP        VTE Risk Mitigation (From admission, onward)         Ordered     apixaban tablet 5 mg  2 times daily      12/11/19 0903                Ana Woodard, FNP-C  Cardiology  [facility]

## 2019-12-13 NOTE — NURSING
"Pt drank orange juice 30 min prior and is c/o of abdominal pain, cramping. Pt abdomen distended with hypoactive bowel sounds noted. Pt states he did have a BM this morning. Pt refuse pain med. Will continue to monitor. Yamel León RN    0900 Pt spouse says pt change his mind and wants to take pain med. Admin pain med and anti-emetic per MAR. Pt states , "It feels like gas pain." Pt lay in bed to find some relief from abd pain. Yamel León RN    1045 Pt HR 100s, pt restless, and continues to c/o abd pain, admin lorazepam per MAR. Will reassess pt. Yamel León RN    1107 Place call to US for STAT order. Radiology tech states US tech is on the floor seeing pts, notify family. Yamel León RN    7207 [address] [place] to notify of US still pending and pt abd pain unrelieved after new order medications admin. Yamel León RN    1215 KUB done. Yamel León RN                      "

## 2019-12-13 NOTE — ASSESSMENT & PLAN NOTE
-Baseline creatinine 1.1 to 1.2  -Monitor Renal indices while on diuretic therapy   -Stable at 1.5 to 1.7   -Stable   12/13/19-  Elevated serum creatinine is noted today 1.3> 1.7 . BUN is elevated suggestive of pre renal .   Lasix  Held today   Reassess tomorrow

## 2019-12-14 PROBLEM — I13.0 CARDIORENAL SYNDROME, STAGE 1-4 OR UNSPECIFIED CHRONIC KIDNEY DISEASE, WITH HEART FAILURE: Status: ACTIVE | Noted: 2019-01-01

## 2019-12-14 PROBLEM — J90 PLEURAL EFFUSION, BILATERAL: Status: ACTIVE | Noted: 2019-01-01

## 2019-12-14 PROBLEM — E16.2 HYPOGLYCEMIA: Status: ACTIVE | Noted: 2019-01-01

## 2019-12-14 PROBLEM — G47.33 OSA (OBSTRUCTIVE SLEEP APNEA): Chronic | Status: ACTIVE | Noted: 2019-01-01

## 2019-12-14 PROBLEM — R57.0 CARDIOGENIC SHOCK: Status: ACTIVE | Noted: 2019-01-01

## 2019-12-14 PROBLEM — K76.82 HEPATIC ENCEPHALOPATHY: Status: ACTIVE | Noted: 2019-01-01

## 2019-12-14 PROBLEM — E87.20 METABOLIC ACIDOSIS: Status: ACTIVE | Noted: 2019-01-01

## 2019-12-14 PROBLEM — K72.01 ACUTE LIVER FAILURE WITH HEPATIC COMA: Status: ACTIVE | Noted: 2019-01-01

## 2019-12-14 PROBLEM — J96.01 ACUTE HYPOXEMIC RESPIRATORY FAILURE: Status: ACTIVE | Noted: 2019-01-01

## 2019-12-14 PROBLEM — E87.5 HYPERKALEMIA: Status: ACTIVE | Noted: 2019-01-01

## 2019-12-14 NOTE — NURSING
Family meeting held. Code status changed to DNR. Family at bedside. PER np orders keep levophed and bipap on. PRN medication give for resp distress and pain. Comfort care. No further questions at this time. Will cont to monitor

## 2019-12-14 NOTE — SIGNIFICANT EVENT
Notified by nursing staff of AM CBG 27.  IM glucagon given per hypoglycemia protocol.  Due to acute deterioration, will transfer patient to ICU for close monitoring.              Bailee Christopher NP

## 2019-12-14 NOTE — NURSING
Evelio Street with [facility]' [facility] notified of death. He stated the  is not a coroners' case.

## 2019-12-14 NOTE — SUBJECTIVE & OBJECTIVE
Past Medical History:   Diagnosis Date    Angina pectoris     Depression     Hyperlipidemia     Hypertension     Insomnia     MI (myocardial infarction) 2009    YESENIA on CPAP        Past Surgical History:   Procedure Laterality Date    CERVICAL FUSION      CORONARY ANGIOPLASTY WITH STENT PLACEMENT  2009       Review of patient's allergies indicates:  No Known Allergies  Current Facility-Administered Medications   Medication Frequency    lorazepam (ATIVAN) injection 2 mg Q2H PRN    morphine injection 4 mg Q30 Min PRN    norepinephrine 4 mg in dextrose 5% 250 mL infusion (premix) (titrating) Continuous    ondansetron injection 4 mg Q6H PRN     Family History     None        Tobacco Use    Smoking status: Former Smoker    Smokeless tobacco: Never Used   Substance and Sexual Activity    Alcohol use: Never     Frequency: Never    Drug use: Never    Sexual activity: Not on file     Review of Systems   Unable to perform ROS: Acuity of condition   Respiratory: Positive for shortness of breath.    All other systems reviewed and are negative.    Objective:     Vital Signs (Most Recent):  Temp: 97.1 °F (36.2 °C) (12/14/19 0730)  Pulse: (!) 114 (12/14/19 1100)  Resp: (!) 26 (12/14/19 1100)  BP: (!) 71/45 (12/14/19 1000)  SpO2: (!) 88 % (12/14/19 1100)  O2 Device (Oxygen Therapy): BiPAP (12/14/19 1105) Vital Signs (24h Range):  Temp:  [96.7 °F (35.9 °C)-98.6 °F (37 °C)] 97.1 °F (36.2 °C)  Pulse:  [] 114  Resp:  [20-32] 26  SpO2:  [85 %-100 %] 88 %  BP: ()/(18-70) 71/45     Weight: 67.8 kg (149 lb 7.6 oz) (12/14/19 0659)  Body mass index is 24.87 kg/m².  Body surface area is 1.76 meters squared.    I/O last 3 completed shifts:  In: 740 [P.O.:740]  Out: 325 [Urine:325]    Physical Exam   Constitutional: He appears well-developed and well-nourished. He appears lethargic. He appears toxic. He has a sickly appearance. He appears ill. He appears distressed. He is not intubated. Face mask in place.   HENT:    Head: Normocephalic and atraumatic.   Mouth/Throat: Mucous membranes are dry (on BiPAP).   Eyes: Pupils are equal, round, and reactive to light. Lids are normal.   Jaundice sclera   Neck: Trachea normal. Neck supple. Carotid bruit is not present.   Cardiovascular: Regular rhythm. Tachycardia present.   Murmur heard.   Systolic murmur is present.  Pulses:       Radial pulses are 1+ on the right side, and 1+ on the left side.        Dorsalis pedis pulses are 0 on the right side, and 0 on the left side.   Pulmonary/Chest: Accessory muscle usage present. Tachypnea noted. He is not intubated. He is in respiratory distress. He has decreased breath sounds in the right lower field and the left lower field.   Abdominal: He exhibits distension. Bowel sounds are absent. There is generalized tenderness.   Genitourinary: Penis normal.   Genitourinary Comments: New in place   Musculoskeletal: Normal range of motion.        Right foot: There is no deformity.        Left foot: There is no deformity.   Mild edema   Lymphadenopathy:     He has no cervical adenopathy.   Neurological: He appears lethargic.   Mumbled speech and withdraws to pain but does not follow commands   Skin: Skin is warm, dry and intact. Capillary refill takes 2 to 3 seconds. No rash noted. No cyanosis.   Jaundice       Significant Labs:  CBC:   Recent Labs   Lab 12/14/19  0625  12/14/19  0845   WBC 17.38*  --   --    RBC 4.00*  --   --    HGB 12.7*  --   --    HCT 38.9*   < > 19*     --   --    MCV 97  --   --    MCH 31.8*  --   --    MCHC 32.6  --   --     < > = values in this interval not displayed.     CMP:   Recent Labs   Lab 12/14/19  0625   GLU 66*   CALCIUM 8.4*   ALBUMIN 2.5*   PROT 5.5*   *   K 6.1*   CO2 17*   CL 91*   *   CREATININE 3.9*   ALKPHOS 97   ALT 1,059*   AST 1,281*   BILITOT 1.9*     All labs within the past 24 hours have been reviewed.    Significant Imaging:  Labs: Reviewed  ECG: Reviewed  X-Ray: Reviewed

## 2019-12-14 NOTE — ASSESSMENT & PLAN NOTE
-HR controlled at time of exam  -Continue amio gtt  -Continue BB as BP permits  -Continue heparin gtt    12/10/19  -Converted to SR this AM  -Switch amiodarone gtt to po  -No BB given borderline BP  -Continue heparin gtt for now; may not need long term AC given brief duration of arrhythmia; will re-evaluate    12/11/19  -Intermittent afib noted overnight    12/12/19  -SR at time of exam  -Add low dose BB  -Continue Eliquis for CVA prophylaxis     12/13/19  -Continue BB, Eliquis    12/14/19  -SR at time of exam  -Meds held due to hypotension and acute liver failure

## 2019-12-14 NOTE — SIGNIFICANT EVENT
Patient Deterioration Alert     Deterioration alert received.  Patient seen and examined, in NAD.  Denies any complaints at present.  VS remain stable.  AM labs showing significant change from previous with WBC 15.3, 29% bands, creatinine of 3.6, , potassium 5.6, AST 1323, ALT 1047, T bili 1.6.  Will order STAT lab redraw with CPK and blood cultures.  Repeat CXR.  Discontinue statin and amio for now.  Start IV vanc and cefepime.  Will f/u repeat labs and manage as warranted.            Bailee Christopher, NP

## 2019-12-14 NOTE — NURSING
Called respiratory to place bipap on patient at 6pm. Patient resting now comfortable with no signs of distress or discomfort. Family at bedside

## 2019-12-14 NOTE — ASSESSMENT & PLAN NOTE
-Secondary to cardiogenic shock/CHF  -Per family wishes, patient is DNR with comfort care measures  -Continue Bipap

## 2019-12-14 NOTE — NURSING
TOD 1240. Asystole. Comfort care. Family at bedside. MD andrea TOP.  and LEE ANN contacted. Will cont to  monitor

## 2019-12-14 NOTE — NURSING
Resumed care from meri lynch. Upon entering room patient unarousable to painful stimuli. Np called to bed side. Femoral pulse palpatable. Patient on bipap. Maintaining oxygen sat. Blood glucose 34 despite D10 cont. PRN orders. Sodium bicarb given and calcium gluconate per orders. Sodium bicarb gtt. Trialysis cath placed. Dobutamine gtt. Labs pending. Np at bedside. Bipap on maintain oxygen saturation of 92 percent. Family updated. Will cont to monitor

## 2019-12-14 NOTE — SUBJECTIVE & OBJECTIVE
Review of Systems   Unable to perform ROS: acuity of condition     Objective:     Vital Signs (Most Recent):  Temp: 97.1 °F (36.2 °C) (12/14/19 0730)  Pulse: (!) 114 (12/14/19 1100)  Resp: (!) 26 (12/14/19 1100)  BP: (!) 71/45 (12/14/19 1000)  SpO2: (!) 88 % (12/14/19 1100) Vital Signs (24h Range):  Temp:  [96.7 °F (35.9 °C)-98.6 °F (37 °C)] 97.1 °F (36.2 °C)  Pulse:  [] 114  Resp:  [20-32] 26  SpO2:  [85 %-100 %] 88 %  BP: ()/(18-70) 71/45     Weight: 67.8 kg (149 lb 7.6 oz)  Body mass index is 24.87 kg/m².     SpO2: (!) 88 %  O2 Device (Oxygen Therapy): BiPAP      Intake/Output Summary (Last 24 hours) at 12/14/2019 1143  Last data filed at 12/14/2019 1100  Gross per 24 hour   Intake 1016.35 ml   Output 100 ml   Net 916.35 ml       Lines/Drains/Airways     Peripherally Inserted Central Catheter Line                 PICC Double Lumen 12/09/19 0820 right brachial 5 days          Central Venous Catheter Line                 Trialysis (Dialysis) Catheter 12/14/19 0845 right femoral less than 1 day          Drain                 Urethral Catheter 12/14/19 0645 Latex 16 Fr. less than 1 day          Arterial Line                 Arterial Line 12/14/19 0830 Right Femoral less than 1 day          Peripheral Intravenous Line                 Peripheral IV - Single Lumen 12/05/19 2032 18 G Right Antecubital 8 days                Physical Exam   Constitutional: He appears distressed.   Ill appearing  Somnolent on bipap   HENT:   Head: Normocephalic and atraumatic.   Eyes:   Jaundice appearing sclera   Neck: Neck supple. JVD present.   Cardiovascular: S1 normal and S2 normal. Tachycardia present.   Pulmonary/Chest:   Diminished BS throughout   Abdominal: He exhibits distension (mild).   Neurological:   Withdraws to pain but does not follow commands   Skin: Skin is warm and dry.   Jaundiced appearing   Nursing note and vitals reviewed.      Significant Labs:   CMP   Recent Labs   Lab 12/13/19  0420 12/14/19  0426  12/14/19  0625   * 133* 131*   K 4.7 5.6* 6.1*   CL 92* 92* 91*   CO2 25 20* 17*   * 77 66*   BUN 66* 104* 110*   CREATININE 1.7* 3.6* 3.9*   CALCIUM 8.9 8.2* 8.4*   PROT  --  5.6* 5.5*   ALBUMIN  --  2.5* 2.5*   BILITOT  --  1.6* 1.9*   ALKPHOS  --  98 97   AST  --  1,323* 1,281*   ALT  --  1,047* 1,059*   ANIONGAP 14 21* 23*   ESTGFRAFRICA 45* 18* 17*   EGFRNONAA 39* 16* 14*   , CBC   Recent Labs   Lab 12/14/19  0426 12/14/19  0625  12/14/19  0845   WBC 15.30* 17.38*  --   --    HGB 12.8* 12.7*  --   --    HCT 39.1* 38.9*   < > 19*    311  --   --     < > = values in this interval not displayed.   , Troponin   Recent Labs   Lab 12/13/19  1147   TROPONINI 10.310*    and All pertinent lab results from the last 24 hours have been reviewed.    Significant Imaging: Echocardiogram:   2D echo with color flow doppler:   Results for orders placed or performed during the hospital encounter of 12/05/19   2D echo with color flow doppler   Result Value Ref Range    QEF 25 (A) 55 - 65    Pericardial Effusion SMALL (A)     Narrative    Date of Procedure: 12/07/2019        TEST DESCRIPTION   Technical Quality: This is a technically adequate study.     Aorta: The aortic root is normal in size.     Left Atrium: The left atrium is normal in size.     Left Ventricle: The left ventricle is normal in size. LV wall thickness is normal. The following segments were akinetic: apical septum, apical lateral wall, apical inferior wall, apical anterior wall.  The following segments were mildly hypokinetic: basal anterior wall.  The following segments were severely hypokinetic: mid inferoseptum, mid anterior wall.  Left ventricular systolic function appears severely depressed. Visually estimated ejection fraction is 25-30%.     Diastolic indices:     Right Atrium: The right atrium is normal in size.     Right Ventricle: The right ventricle is normal in size. Global right ventricular systolic function appears normal.      Aortic Valve:  The aortic valve is normal in structure.     Mitral Valve:  The mitral valve is normal in structure.     Pulmonary Valve:  The pulmonic valve is not well seen.     Pericardium: There is evidence of a small postero-lateral pericardial effusion.     IVC: IVC is enlarged but collapses > 50% with a sniff, suggesting intermediate right atrial pressure of 8 mmHg.     Intracavitary: There is no evidence of intracavity mass, thrombi, or vegetation.         CONCLUSIONS     1 - Wall motion abnormalities.     2 - Severely depressed left ventricular systolic function (EF 25-30%).     3 - Normal right ventricular systolic function .     4 - Focal small pericardial effusion on right side. No tamponade    5 - Intermediate central venous pressure.     6 - Limited echo study to rule out mechanical rupture.            This document has been electronically    SIGNED BY: Lamont Andres MD On: 12/08/2019 10:28   , EKG: Reviewed and X-Ray: CXR: X-Ray Chest 1 View (CXR):   Results for orders placed or performed during the hospital encounter of 12/05/19   X-Ray Chest 1 View    Narrative    EXAMINATION:  XR CHEST 1 VIEW    CLINICAL HISTORY:  Worsening Infection;    TECHNIQUE:  Single frontal view of the chest was performed.    COMPARISON:  Chest radiograph 12/13/2019 with priors    FINDINGS:  Stable positioning of a right-sided peripherally inserted central venous catheter.  Increased area of airspace consolidation within the right lower lobe.  Trace blunting of the right costophrenic angle concerning for small effusion.  Persistent small to moderate-sized left pleural effusion.  No pneumothorax.      Impression    Increased area of airspace consolidation within the right lower lobe.    Persistent bilateral pleural effusions, left greater than right.      Electronically signed by: Henry Martin  Date:    12/14/2019  Time:    08:32    and X-Ray Chest PA and Lateral (CXR): No results found for this visit on 12/05/19.

## 2019-12-14 NOTE — SUBJECTIVE & OBJECTIVE
Review of Systems   Unable to perform ROS: Mental status change         Objective:     Vital Signs (Most Recent):  Temp: 97.1 °F (36.2 °C) (12/14/19 0730)  Pulse: (!) 125 (12/14/19 0930)  Resp: (!) 29 (12/14/19 0930)  BP: (!) 114/58 (12/14/19 0904)  SpO2: (!) 90 % (12/14/19 0930) Vital Signs (24h Range):  Temp:  [96.7 °F (35.9 °C)-98.6 °F (37 °C)] 97.1 °F (36.2 °C)  Pulse:  [] 125  Resp:  [20-33] 29  SpO2:  [88 %-100 %] 90 %  BP: ()/(18-70) 114/58     Weight: 67.8 kg (149 lb 7.6 oz)  Body mass index is 24.87 kg/m².      Intake/Output Summary (Last 24 hours) at 12/14/2019 1002  Last data filed at 12/14/2019 0945  Gross per 24 hour   Intake 914.65 ml   Output 100 ml   Net 814.65 ml       Physical Exam   Constitutional: He appears well-developed and well-nourished. He appears lethargic. He appears toxic. He has a sickly appearance. He appears ill. He appears distressed. He is not intubated. Face mask in place.   HENT:   Head: Normocephalic and atraumatic.   Mouth/Throat: Mucous membranes are dry (on BiPAP).   Eyes: Pupils are equal, round, and reactive to light. Lids are normal.   Jaundice sclera   Neck: Trachea normal. Neck supple. Carotid bruit is not present.   Cardiovascular: Regular rhythm. Tachycardia present.   Murmur heard.   Systolic murmur is present.  Pulses:       Radial pulses are 1+ on the right side, and 1+ on the left side.        Dorsalis pedis pulses are 0 on the right side, and 0 on the left side.   Pulmonary/Chest: Accessory muscle usage present. Tachypnea noted. He is not intubated. He is in respiratory distress. He has decreased breath sounds in the right lower field and the left lower field.   Abdominal: He exhibits distension. Bowel sounds are absent. There is generalized tenderness.   Genitourinary: Penis normal.   Genitourinary Comments: New in place   Musculoskeletal: Normal range of motion.        Right foot: There is no deformity.        Left foot: There is no deformity.    Mild edema   Lymphadenopathy:     He has no cervical adenopathy.   Neurological: He appears lethargic.   Mumbled speech and withdraws to pain but does not follow commands   Skin: Skin is warm, dry and intact. Capillary refill takes 2 to 3 seconds. No rash noted. No cyanosis.   Jaundice       Vents:  Oxygen Concentration (%): 50 (12/14/19 0739)    Lines/Drains/Airways     Peripherally Inserted Central Catheter Line                 PICC Double Lumen 12/09/19 0820 right brachial 5 days          Central Venous Catheter Line                 Trialysis (Dialysis) Catheter 12/14/19 0845 right femoral less than 1 day          Drain                 Urethral Catheter 12/14/19 0645 Latex 16 Fr. less than 1 day          Arterial Line                 Arterial Line 12/14/19 0830 Right Femoral less than 1 day          Peripheral Intravenous Line                 Peripheral IV - Single Lumen 12/05/19 2032 18 G Right Antecubital 8 days                Significant Labs:    CBC/Anemia Profile:  Recent Labs   Lab 12/14/19  0426 12/14/19  0625 12/14/19  0739 12/14/19  0845   WBC 15.30* 17.38*  --   --    HGB 12.8* 12.7*  --   --    HCT 39.1* 38.9* 33* 19*    311  --   --    MCV 95 97  --   --    RDW 12.6 12.7  --   --         Chemistries:  Recent Labs   Lab 12/13/19  0420 12/14/19  0426 12/14/19  0625   * 133* 131*   K 4.7 5.6* 6.1*   CL 92* 92* 91*   CO2 25 20* 17*   BUN 66* 104* 110*   CREATININE 1.7* 3.6* 3.9*   CALCIUM 8.9 8.2* 8.4*   ALBUMIN  --  2.5* 2.5*   PROT  --  5.6* 5.5*   BILITOT  --  1.6* 1.9*   ALKPHOS  --  98 97   ALT  --  1,047* 1,059*   AST  --  1,323* 1,281*   MG 2.7* 2.9*  --    PHOS 4.7* 5.8*  --        ABGs:   Recent Labs   Lab 12/14/19  0845   PH 7.287*   PCO2 23.8*   HCO3 11.3*   POCSATURATED 81*   BE -15     Coagulation:   Recent Labs   Lab 12/14/19  0734   INR 1.9*     Lactic Acid:   Recent Labs   Lab 12/14/19  0625   LACTATE 10.0*     POCT Glucose:   Recent Labs   Lab 12/14/19  0739 12/14/19  0805  12/14/19  0835   POCTGLUCOSE 72 34* 117*     Troponin:   Recent Labs   Lab 12/13/19  1147   TROPONINI 10.310*     All pertinent labs within the past 24 hours have been reviewed.    Significant Imaging:  CXR: I have reviewed all pertinent results/findings within the past 24 hours and my personal findings are:  mod to large bilat pleural effusions

## 2019-12-14 NOTE — PROGRESS NOTES
[facility] -   Cardiology  Progress Note    Patient Name: Raleigh Walsh  MRN: [id number]  Admission Date: 12/5/2019  Hospital Length of Stay: 9 days  Code Status: DNR   Attending Physician: Gilmar Florence MD   Primary Care Physician: Akhil Smith MD  Expected Discharge Date: 12/15/2019  Principal Problem:Cardiogenic shock    Subjective:   HPI:  75 y/o male woth PMHx for CADHx of PCI, HTN, HLP presented to [facility] ER with anterior MI and tx with TNK. [facility] Pt initially reperfused clinically but later on with CP.  Pt taken to the cath lab for postinfarct angina.    Hospital Course:   12/6/19--Patient seen and examined in room, lying in bed. Continues to complain of intermittent chest discomfort today but much improved since PCI. He was noted to have 8-10 beats of VT on monitor at time of exam this AM. Keep in ICU for now. Labs reviewed, K+ 4.8, Cr 1.4, Troponin >50.     12/7/19--Patient seen and examined in ICU, lying in bed. Had multiple episodes of shortness of breath, palpitations with diaphoresis noted o/n. HR at time of exam 110s. Will optimize medical regimen for CAD/CHF today. Discussed with patient and family at bedside given guarded prognosis given ICM post STEMI. Will add Tridil gtt today and reassess response.     12/8/19--Patient seen and examined in ICU today. Reports continued shortness of breath today but slightly/marginally improved from yesterday. HR remains elevated today in 110-120s, EKG revealed ST with PACs today. Patient unable to tolerate Tridil gtt yesterday due to hypotension. Will try to optimize medical regimen today but low BP and elevated HR makes medical therapy challenging. Labs reviewed, K+ 4.3, Cr 1.7, BUN 42. Repeat CXR ordered.     12/9/19-Patient seen and examined today. Still SOB and fatigued, some improvement overnight. Denies chest pain. Converted to aflutter this AM, amiodarone drip initiated without bolus. Labs reviewed. Creatinine  1.5, liver enzymes improving. Troponin trending down. BP remains marginal.     12/10/19-Patient seen and examined today. Appears slightly stronger, more talkative. Still endorses SOB. No sharan chest pain or tightness. Converted to SR overnight. Labs reviewed. Creatinine stable.     12/11/19-Patient seen and examined today, sitting up in chair. Slowly progressing. SOB improved. Did admit to some mild chest tightness overnight, has since resolved. Intermittent afib noted as well. Labs reviewed, K slightly low at 3.3. BP stable.     12/12/19-Patient seen and examined today, sitting up in chair. Ambulated around ICU unit this AM. Admits to fatigue/malaise. SOB improving. No chest pain/tightness overnight. Labs reviewed and are stable. BP borderline.    12/13/19--Patient seen and examined in ICU today, complains of abdominal discomfort today. No chest pain or SOB today. Labs reviewed, bump in creatinine to 1.7 today. Hold Lasix today and resume tomorrow PO Lasix 20 or 40 mg daily.     12/14/19-Patient seen and examined today. Deteriorated overnight with worsening respiratory status and cardiogenic shock/multiorgan failure. Somnolent this AM on Bipap. BP low, even on pressor support. Labs reviewed. Creatinine 3.9. LA > 10. AST/ALT markedly elevated. WBC 15,000. Ammonia 98. Critical care med discussed code status with family, changed to DNR with plans for comfort care. Repeat echo pending.        Review of Systems   Unable to perform ROS: acuity of condition     Objective:     Vital Signs (Most Recent):  Temp: 97.1 °F (36.2 °C) (12/14/19 0730)  Pulse: (!) 114 (12/14/19 1100)  Resp: (!) 26 (12/14/19 1100)  BP: (!) 71/45 (12/14/19 1000)  SpO2: (!) 88 % (12/14/19 1100) Vital Signs (24h Range):  Temp:  [96.7 °F (35.9 °C)-98.6 °F (37 °C)] 97.1 °F (36.2 °C)  Pulse:  [] 114  Resp:  [20-32] 26  SpO2:  [85 %-100 %] 88 %  BP: ()/(18-70) 71/45     Weight: 67.8 kg (149 lb 7.6 oz)  Body mass index is 24.87 kg/m².     SpO2:  (!) 88 %  O2 Device (Oxygen Therapy): BiPAP      Intake/Output Summary (Last 24 hours) at 12/14/2019 1143  Last data filed at 12/14/2019 1100  Gross per 24 hour   Intake 1016.35 ml   Output 100 ml   Net 916.35 ml       Lines/Drains/Airways     Peripherally Inserted Central Catheter Line                 PICC Double Lumen 12/09/19 0820 right brachial 5 days          Central Venous Catheter Line                 Trialysis (Dialysis) Catheter 12/14/19 0845 right femoral less than 1 day          Drain                 Urethral Catheter 12/14/19 0645 Latex 16 Fr. less than 1 day          Arterial Line                 Arterial Line 12/14/19 0830 Right Femoral less than 1 day          Peripheral Intravenous Line                 Peripheral IV - Single Lumen 12/05/19 2032 18 G Right Antecubital 8 days                Physical Exam   Constitutional: He appears distressed.   Ill appearing  Somnolent on bipap   HENT:   Head: Normocephalic and atraumatic.   Eyes:   Jaundice appearing sclera   Neck: Neck supple. JVD present.   Cardiovascular: S1 normal and S2 normal. Tachycardia present.   Pulmonary/Chest:   Diminished BS throughout   Abdominal: He exhibits distension (mild).   Neurological:   Withdraws to pain but does not follow commands   Skin: Skin is warm and dry.   Jaundiced appearing   Nursing note and vitals reviewed.      Significant Labs:   CMP   Recent Labs   Lab 12/13/19 0420 12/14/19 0426 12/14/19  0625   * 133* 131*   K 4.7 5.6* 6.1*   CL 92* 92* 91*   CO2 25 20* 17*   * 77 66*   BUN 66* 104* 110*   CREATININE 1.7* 3.6* 3.9*   CALCIUM 8.9 8.2* 8.4*   PROT  --  5.6* 5.5*   ALBUMIN  --  2.5* 2.5*   BILITOT  --  1.6* 1.9*   ALKPHOS  --  98 97   AST  --  1,323* 1,281*   ALT  --  1,047* 1,059*   ANIONGAP 14 21* 23*   ESTGFRAFRICA 45* 18* 17*   EGFRNONAA 39* 16* 14*   , CBC   Recent Labs   Lab 12/14/19  0426 12/14/19  0625  12/14/19  0845   WBC 15.30* 17.38*  --   --    HGB 12.8* 12.7*  --   --    HCT 39.1*  38.9*   < > 19*    311  --   --     < > = values in this interval not displayed.   , Troponin   Recent Labs   Lab 12/13/19  1147   TROPONINI 10.310*    and All pertinent lab results from the last 24 hours have been reviewed.    Significant Imaging: Echocardiogram:   2D echo with color flow doppler:   Results for orders placed or performed during the hospital encounter of 12/05/19   2D echo with color flow doppler   Result Value Ref Range    QEF 25 (A) 55 - 65    Pericardial Effusion SMALL (A)     Narrative    Date of Procedure: 12/07/2019        TEST DESCRIPTION   Technical Quality: This is a technically adequate study.     Aorta: The aortic root is normal in size.     Left Atrium: The left atrium is normal in size.     Left Ventricle: The left ventricle is normal in size. LV wall thickness is normal. The following segments were akinetic: apical septum, apical lateral wall, apical inferior wall, apical anterior wall.  The following segments were mildly hypokinetic: basal anterior wall.  The following segments were severely hypokinetic: mid inferoseptum, mid anterior wall.  Left ventricular systolic function appears severely depressed. Visually estimated ejection fraction is 25-30%.     Diastolic indices:     Right Atrium: The right atrium is normal in size.     Right Ventricle: The right ventricle is normal in size. Global right ventricular systolic function appears normal.     Aortic Valve:  The aortic valve is normal in structure.     Mitral Valve:  The mitral valve is normal in structure.     Pulmonary Valve:  The pulmonic valve is not well seen.     Pericardium: There is evidence of a small postero-lateral pericardial effusion.     IVC: IVC is enlarged but collapses > 50% with a sniff, suggesting intermediate right atrial pressure of 8 mmHg.     Intracavitary: There is no evidence of intracavity mass, thrombi, or vegetation.         CONCLUSIONS     1 - Wall motion abnormalities.     2 - Severely depressed  left ventricular systolic function (EF 25-30%).     3 - Normal right ventricular systolic function .     4 - Focal small pericardial effusion on right side. No tamponade    5 - Intermediate central venous pressure.     6 - Limited echo study to rule out mechanical rupture.            This document has been electronically    SIGNED BY: Lamont Andres MD On: 12/08/2019 10:28   , EKG: Reviewed and X-Ray: CXR: X-Ray Chest 1 View (CXR):   Results for orders placed or performed during the hospital encounter of 12/05/19   X-Ray Chest 1 View    Narrative    EXAMINATION:  XR CHEST 1 VIEW    CLINICAL HISTORY:  Worsening Infection;    TECHNIQUE:  Single frontal view of the chest was performed.    COMPARISON:  Chest radiograph 12/13/2019 with priors    FINDINGS:  Stable positioning of a right-sided peripherally inserted central venous catheter.  Increased area of airspace consolidation within the right lower lobe.  Trace blunting of the right costophrenic angle concerning for small effusion.  Persistent small to moderate-sized left pleural effusion.  No pneumothorax.      Impression    Increased area of airspace consolidation within the right lower lobe.    Persistent bilateral pleural effusions, left greater than right.      Electronically signed by: Henry Martin  Date:    12/14/2019  Time:    08:32    and X-Ray Chest PA and Lateral (CXR): No results found for this visit on 12/05/19.    Assessment and Plan:   Patient with worsening status overnight/cardiogenic shock/respiratory failure, multiorgan failure. Family elected no intubation/transition to comfort care.    * Cardiogenic shock  -Family elected DNR status/comfort measures      Acute hypoxemic respiratory failure  -Secondary to cardiogenic shock/CHF  -Per family wishes, patient is DNR with comfort care measures  -Continue Bipap    Acute liver failure with hepatic coma  -Secondary to cardiogenic shock    Atrial flutter, New- onset   -HR controlled at time of  exam  -Continue amio gtt  -Continue BB as BP permits  -Continue heparin gtt    12/10/19  -Converted to SR this AM  -Switch amiodarone gtt to po  -No BB given borderline BP  -Continue heparin gtt for now; may not need long term AC given brief duration of arrhythmia; will re-evaluate    12/11/19  -Intermittent afib noted overnight    12/12/19  -SR at time of exam  -Add low dose BB  -Continue Eliquis for CVA prophylaxis     12/13/19  -Continue BB, Eliquis    12/14/19  -SR at time of exam  -Meds held due to hypotension and acute liver failure    Leukocytosis, likely reactive   -Mgmt per primary team    PENNY (acute kidney injury)  -Secondary to cardiogenic shock    Acute systolic congestive heart failure, NYHA class 3  -EF 15-20%, +WMA, PHTN  -Continue IV lasix BID, IV lopressor Q6H  -Use Ramipril if BP can tolerate  -Dash diet, 2 gm sodium restriction  -1200 ml fluid restriction  -Daily weights  -Strict intake and output  -Avoid exertional activities  -CXR stable  -Reassess in AM    12/9/19  -Slowly improving  -Continue IV diuresis  -Continue BB and ACEi as BP permits  -Optimization of medical therapy challenging given hypotension     12/10/19  -Appears slightly improved today  -BP marginal  -Hold BB, ACEi, and Lasix for now  -Will further optimize medical therapy as clinical condition permits  -Will consider low dose dobutamine, if needed  -LifeVest upon d/c home for SCD prevention    12/11/19  -Slowly progressing  -Decrease IV Lasix to 40 mg daily  -Continue BB and ACEi as BP permits  -Add Ranexa 500 mg BID  -Will further optimize regimen as clinical condition permits    12/12/19  -Continues to make progress  -Switch to po Lasix tmw  -Add low dose Toprol XL  -Continue Ranexa  -Will add ACEi/ARB once BP permits    12/13/19  -LifeVest prior to discharge  -Hold Lasix today given bump in renal fn  -Continue Toprol XL, Ranexa  -Add ACEi/ARB once BP, renal fn allows    12/14/19  -Deteriorated overnight with multiorgan  failure/cardiogenic shock  -Meds held due to hypotension/need for pressor support and ARF and liver failure  -Comfort measures as per family wishes    YESENIA (obstructive sleep apnea)  -continue nightly cpap    Paroxysmal VT  Continue BB  Keep K+ >4 and Mag >2  Continue telemetry monitoring  Keep in ICU for now    12/7  -continue BB    12/8  -keep K+>4 and Mag>2  -Continue BB as tolerated    12/9/19  -Keep electrolytes WNL  -Continue BB as BP permits    12/10/19  -See plan above  -LifeVest for SCD prevention    Mixed hyperlipidemia  -Continue statin  -Check FLP    STEMI (ST elevation myocardial infarction)  75 y/o male woth PMHx for CADHx of PCI, HTN, HLP presented to [facility] ER with anterior MI and tx with [facility]NK. [facility] Pt initially reperfused clinically but later on with CP.  Pt taken to the cath lab for postinfarct angina.    12/6/19  -s/p PCI of LAD per Dr. Alcazar  -ECHO pending  -Continue ASA, Statin, BB, ACEi, Plavix  -Check FLP  -Keep in ICU for today  -Dash diet, 2 gm sodium restriction  -1.5L Fluid restriction  -Repeat labs in AM    12/7/19  -Echo revealed EF 15-20%, +WMA's, PHTN, DD  -Add Tridil gtt today  -Stop ACEi today given need for Tridil gtt for symptom management   -Continue ASA, Statin, Plavix, BB, IV lasix BID, Tridil gtt  -Further recs to follow pending clinical course    12/8/19  -Continue ASA, Statin, Plavix, IV lasix BID, IV lopressor q6H  -Add ramipril if BP can tolerate  -keep in ICU for close monitoring given clinical condition with little/no improvement    12/9/19  -No chest pain overnight, SOB slowly improving  -Continue ASA, statin, Plavix, IV Lasix, IV lopressor  -Hold ACEi if needed given borderline BP  -Keep in ICU    12/10/19  -Slowly progressing post MI  -Continue ASA, statin, Plavix  -Continue other meds as BP permits  -Will further optimize as clinical condition permits  -Will consider low dose dobutamine, if needed but would like to try to avoid if possible given  risk of recurrent aflutter/arrhythmias     12/11/19  -Continues to progress   -Continue ASA, statin, Plavix  -Continue BB and ACEi as BP permits  -Ranexa 500 mg BID added  -OOB and ambulate today if possible    12/12/19  -Chest pain free overnight  -Continue ASA, statin, Plavix  -Add low dose Toprol XL   -Continue Ranexa  -Will add ACEi/ARB once BP permits    12/13/19  -NO chest pain O/N  -Continue ASA, Statin, Plavix, BB, Ranexa  -May add ACEi/ARB tomorrow pending clinical course        VTE Risk Mitigation (From admission, onward)    None          Veronica Raygoza PA-C  Cardiology  [facility]

## 2019-12-14 NOTE — PROCEDURES
"Raleigh Walsh is a 74 y.o. male patient.    Temp: 97.1 °F (36.2 °C) (12/14/19 0730)  Pulse: (!) 125 (12/14/19 0930)  Resp: (!) 29 (12/14/19 0930)  BP: (!) 114/58 (12/14/19 0904)  SpO2: (!) 90 % (12/14/19 0930)  Weight: 67.8 kg (149 lb 7.6 oz) (12/14/19 0659)  Height: 5' 5" (165.1 cm) (12/13/19 1100)       Arterial Line  Date/Time: 12/14/2019 8:14 AM  Location procedure was performed: [facility] INTENSIVE CARE UNIT  Performed by: Enmanuel Rodriguez NP  Authorized by: Enmanuel Rodriguez NP   Pre-op Diagnosis: shock  Post-operative diagnosis: cardiogenic shock  Consent Done: Not Needed  Preparation: Patient was prepped and draped in the usual sterile fashion.  Indications: multiple ABGs, respiratory failure and hemodynamic monitoring  Location: right femoral  Patient sedated: no  Needle gauge: 5F.  Seldinger technique: Seldinger technique used  Number of attempts: 1  Complications: No  Estimated blood loss (mL): 1  Specimens: No  Implants: No  Post-procedure: line sutured and dressing applied  Post-procedure CMS: unchanged  Patient tolerance: Patient tolerated the procedure well with no immediate complications          Enmanuel Rodriguez  12/14/2019  "

## 2019-12-14 NOTE — NURSING
NP at bedside. Dobutamine gtt stopped. Levo gtt started. 2 amps. Echo at bedside. Family meeting. Patient responsive to pain. Bipap in place. NSR on monitor. Will cont to monitor

## 2019-12-14 NOTE — HPI
Patient is a 74-year-old male with history of coronary artery disease status post angioplasty in the past has been hospitalized for acute ST-elevation MI status post cardiac catheterization with worsening congestive heart failure and cardiac events as outlined in the hospital course.  Patient has acute kidney injury due to cardiorenal syndrome.  Hospitalized and then transferred to intensive care unit.  On BiPAP for severe shortness of breath.  Multiorgan failure evaluated in the ICU at the bedside.  Multiple discussions with ICU team as well as the patient's family which includes patient's wife, daughter and son-in-law.  Patient made DNR and comfort measures only with critical situation and poor prognosis made aware for all parties involved.  No dialysis options have been discussed and agreed upon at this time.

## 2019-12-14 NOTE — PROGRESS NOTES
Pharmacokinetic Assessment Sign Off: IV Vancomycin    Therapy with Vancomycin complete and/or consult discontinued by provider.  Pharmacy will sign off, please re-consult as needed.    Thank you for allowing us to participate in this patient's care.     Natalia Levine PharmD 12/14/2019 8:18 AM

## 2019-12-14 NOTE — DISCHARGE SUMMARY
[facility] Hospital Medicine  Discharge Summary      Patient Name: Raleigh Walsh  MRN: [id number]  Admission Date: 12/5/2019  Hospital Length of Stay: 9 days  Discharge Date and Time:  12/14/2019 1:29 PM  Attending Physician: Gilmar Florence MD   Discharging Provider: Gilmar Florence MD  Primary Care Provider: Akhil Smith MD      HPI:   75 YO WM with known CAD S/P MI and prior stenting;  Presented to ED at [facility] after experiencing chest pain while at a ballgame; He ruled in foe a STEMI; was treated with TPA and transferred here; He was taken to the cath lab where he underwent PCI with stent placed to the proximal LAD by Dr. Alcazar.  He was transferred to the ICU in stable condition.    Procedure(s) (LRB):  CATHETERIZATION, HEART, LEFT (Left)      Hospital Course:   12/7/19-  Pt c/o new onset SOB on minimal exertion . Denies chest pain.   Remains on O2 at 5L/min. Pt is noted to be tachycardic and tachypnic.   Episodes of nausea and emesis x 3 yesterday but none today   EKG revealed sinus tachycardia , ST elevation ant and lat leads and no longer PVC's   Echo revealed severely depressed left vent systolic function with EF of 15-20%, PA pressure 43.   Cardiology is recommending diuresis with IV furosemide , continue BB and start Tridil drip.   Prognosis is guarded at this time.   12/8/19-  Pt is seen at bedside . Remains mildly tachycardic and tachypnic .  Reports SOB is better today . Good urine output noted with diuresis 1.5 L yesterday   Remains on O2 at 5L/min. Tolerated BiPAP well last night.     Tridil drip discontinued yesterday due to hypotension   Remains on low dose BB and Ramipril restarted at 2.5 mg daily   Prognosis guarded.     12/9/19-  Overnight developed Atrial flutter and started on Amiodarone ggt and Heparin ggt. Amiodarone bolus was not given due to marginal BP.   On BB . Rate is fairly controlled.  EKG revealed persistent ST elevation anterior leads     Remains on O2  at 5 L/min via NC and nightly BiPAP  Good urine output 1.3 L yesterday   Leukocytosis has resolved . Blood cultures are negative     Pt denies chest pain . Reports SOB is better at rest     12/10/19-  Pt feels fair. Denies chest pain though reports some chest tightness today . This is not like when he had MI. Reports SOB is better at rest. Has not moved from bed yet. Wears BiPAP at night. Now on NC at 4L/min.   Converted to sinus rhythm noted in EKG today . Persistent mild ST elevation noted ant leads   Amiodarone switched to pill  Remains on Heparin drip  On Lasix for diuresis . Urine output is good.  Renal indices are stable   Leukocytosis has resolved   12/11/19-  Remains on O2 at 4L/min.  Nightly BiPAP  Still c/o some SOB . Intermittent A.fib overnight and  had an episode of chest discomfort last night after eating ice cream  per nursing staff.  This morning pt denies chest pain but endorses SOB . Good diuresis is noted.   On Oral amiodarone . Started on NOAC by Cardiology today.  BB as BP permits   Lasix dose adjusted to IV daily per Cardiology   On Ranolazine   PT is seeing pt and is able to participate    12/12/19-  Pt is sitting in the bedside chair . Still C/O SOB with exertion though thinks it is getting better .  Episode of nausea and vomiting shortly after taking morning meds required a dose of antiemetic   Felt better shortly after giving Zofran. Appetite is fair   Performance with PT is improving   Remains on O2 at 4L/min   Good diuresis is noted   Pt denies chest pain   12/13/19-  Remains on O2 at 4L/min  Per Nursing note ---Intermittent nausea and vomiting throughout the night. Mild improvement with PRN medications. Complaints of heartburn and feeling bloated. BM attempted multiple times.  This morning during visit pt is noted to have upper abdominal discomfort which he describes as cramp , c/o heartburn , nausea , decreased appetite . States smelling food makes him nauseated. Denies chest pain or  worsening of SOB. Not finding a comfortable position in the bed and prefers to sit in the chair.     GI cocktail and Bentyl 10 mg IV given with mild improvement of symptoms.  X-Ray KUB and U/S abd order is placed.   EKG and Troponin I ordered -  EKG - no new changes and troponin is down trend     Cardiology visited pt this morning.     12/14/19-  Deteriorated overnight with worsening respiratory status , worsening hypotension, decreased responsiveness , hypoglycemia , multiorgan failure include acute kidney injury with hyperkalemia , shock liver suggestive of  Cardiogenic shock secondary to ST elevation anterior wall MI. Pt was transferred back to ICU. Cardiology and Nephrology were called. Prognosis deemed grave.  Multiple discussions with  the patient's family which includes patient's wife, daughter and son-in-law.  Patient made DNR and comfort measures offered.  No dialysis options was discussed with the family by Nephrology given pt's current extreme poor prognosis and medically irreversible clinical condition.     Pt passed away at 1240, 2019.      Consults:   Consults (From admission, onward)        Status Ordering Provider     Inpatient consult to Nephrology  Once     Provider:  Kathleen Lebron MD    Acknowledged CHINO CASTILLO     Inpatient consult to Nephrology  Once     Provider:  Kathleen Lebron MD    Acknowledged INDY WHITE     Inpatient consult to Pulmonology  Once     Provider:  Kofi Barnhart MD    Completed IGNACIA SALOMON          No new Assessment & Plan notes have been filed under this hospital service since the last note was generated.  Service: Hospital Medicine    Final Active Diagnoses:    Diagnosis Date Noted POA    PRINCIPAL PROBLEM:  Cardiogenic shock [R57.0] 12/14/2019 No    STEMI (ST elevation myocardial infarction) [I21.3] 12/05/2019 Yes    Dyspepsia, drug induced vs ulcer dyspepsia  [R10.13] 12/13/2019 No    PENNY (acute kidney injury) [N17.9] 12/07/2019 Yes     Elevated LFTs [R94.5] 2019 No    Paroxysmal VT [I47.2] 2019 Yes    Acute systolic congestive heart failure, NYHA class 3 [I50.21] 2019 No    Cardiorenal syndrome, stage 1-4 or unspecified chronic kidney disease, with heart failure [I13.0] 2019 Yes    Hyperkalemia [E87.5] 2019 No    Hepatic encephalopathy [K72.90] 2019 No    Acute liver failure with hepatic coma [K72.01] 2019 No    Acute hypoxemic respiratory failure [J96.01] 2019 No    Metabolic acidosis [E87.2] 2019 No    Hypoglycemia [E16.2] 2019 No    Pleural effusion, bilateral [J90] 2019 No    Atrial flutter, New- onset  [I48.92] 2019 No    Leukocytosis, likely reactive  [D72.829] 2019 No    Mixed hyperlipidemia [E78.2] 2019 Yes    YESENIA (obstructive sleep apnea) [G47.33] 2019 Yes     Chronic      Problems Resolved During this Admission:    Diagnosis Date Noted Date Resolved POA    Nausea [R11.0] 2019 Yes       Discharged Condition:     Disposition:     Follow Up:    Patient Instructions:   No discharge procedures on file.    Significant Diagnostic Studies: Labs:   BMP:   Recent Labs   Lab 19  04219  0625   * 77 66*   * 133* 131*   K 4.7 5.6* 6.1*   CL 92* 92* 91*   CO2 25 20* 17*   BUN 66* 104* 110*   CREATININE 1.7* 3.6* 3.9*   CALCIUM 8.9 8.2* 8.4*   MG 2.7* 2.9*  --    , CMP   Recent Labs   Lab 19  04219  0625   * 133* 131*   K 4.7 5.6* 6.1*   CL 92* 92* 91*   CO2 25 20* 17*   * 77 66*   BUN 66* 104* 110*   CREATININE 1.7* 3.6* 3.9*   CALCIUM 8.9 8.2* 8.4*   PROT  --  5.6* 5.5*   ALBUMIN  --  2.5* 2.5*   BILITOT  --  1.6* 1.9*   ALKPHOS  --  98 97   AST  --  1,323* 1,281*   ALT  --  1,047* 1,059*   ANIONGAP 14 21* 23*   ESTGFRAFRICA 45* 18* 17*   EGFRNONAA 39* 16* 14*   , CBC   Recent Labs   Lab 19  0426 19  0625  19  0845   WBC  15.30* 17.38*  --   --    HGB 12.8* 12.7*  --   --    HCT 39.1* 38.9*   < > 19*    311  --   --     < > = values in this interval not displayed.    and Troponin   Recent Labs   Lab 12/13/19  1147   TROPONINI 10.310*       Pending Diagnostic Studies:     Procedure Component Value Units Date/Time    Ammonia [[id number]] Collected:  12/14/19 0753    Order Status:  Sent Lab Status:  In process Updated:  12/14/19 0754    Specimen:  Blood     Lactic acid, plasma [[id number]] Collected:  12/14/19 0753    Order Status:  Sent Lab Status:  In process Updated:  12/14/19 0754    Specimen:  Blood     Troponin I [[id number]] Collected:  12/14/19 0808    Order Status:  Sent Lab Status:  In process Updated:  12/14/19 0808    Specimen:  Blood     Urinalysis [[id number]] Collected:  12/08/19 0833    Order Status:  Sent Lab Status:  In process Updated:  12/08/19 0833    Specimen:  Urine          Medications:      Indwelling Lines/Drains at time of discharge:   Lines/Drains/Airways     Peripherally Inserted Central Catheter Line                 PICC Double Lumen 12/09/19 0820 right brachial 5 days          Central Venous Catheter Line                 Trialysis (Dialysis) Catheter 12/14/19 0845 right femoral less than 1 day          Drain                 Urethral Catheter 12/14/19 0645 Latex 16 Fr. less than 1 day          Arterial Line                 Arterial Line 12/14/19 0830 Right Femoral less than 1 day                Time spent on the discharge of patient: 90  minutes  Patient was seen and examined on the date of discharge and determined to be suitable for discharge.    Critical care time spent on the evaluation and treatment of severe organ dysfunction, review of pertinent labs and imaging studies, discussions with consulting providers and discussions with patient/family: 90  minutes.     Gilmar Florence MD  Department of Hospital Medicine  [facility]

## 2019-12-14 NOTE — ASSESSMENT & PLAN NOTE
-EF 15-20%, +WMA, PHTN  -Continue IV lasix BID, IV lopressor Q6H  -Use Ramipril if BP can tolerate  -Dash diet, 2 gm sodium restriction  -1200 ml fluid restriction  -Daily weights  -Strict intake and output  -Avoid exertional activities  -CXR stable  -Reassess in AM    12/9/19  -Slowly improving  -Continue IV diuresis  -Continue BB and ACEi as BP permits  -Optimization of medical therapy challenging given hypotension     12/10/19  -Appears slightly improved today  -BP marginal  -Hold BB, ACEi, and Lasix for now  -Will further optimize medical therapy as clinical condition permits  -Will consider low dose dobutamine, if needed  -LifeVest upon d/c home for SCD prevention    12/11/19  -Slowly progressing  -Decrease IV Lasix to 40 mg daily  -Continue BB and ACEi as BP permits  -Add Ranexa 500 mg BID  -Will further optimize regimen as clinical condition permits    12/12/19  -Continues to make progress  -Switch to po Lasix tmw  -Add low dose Toprol XL  -Continue Ranexa  -Will add ACEi/ARB once BP permits    12/13/19  -LifeVest prior to discharge  -Hold Lasix today given bump in renal fn  -Continue Toprol XL, Ranexa  -Add ACEi/ARB once BP, renal fn allows    12/14/19  -Deteriorated overnight with multiorgan failure/cardiogenic shock  -Meds held due to hypotension/need for pressor support and ARF and liver failure  -Comfort measures as per family wishes

## 2019-12-14 NOTE — PROGRESS NOTES
Adjusted the dose due to CrCl of 15.9. For severe infections Cefepime 2 g q 24 hr is recommended.

## 2019-12-14 NOTE — NURSING
Pt transferred from Tele report received from LAVON Ramirez. Pt placed on CM SR noted and Bipap in place. Pt not dollowing commands however does move all extremities. LAVON Anderson at bedside  to initiate D10 gtt at 75ml secondary to continued hypoglycemia.

## 2019-12-14 NOTE — SIGNIFICANT EVENT
Was called to evaluate pt at bedside . Pt with h/o ST elevation acute anterior wall MI and cardiogenic shock.   On exam no spontaneous respiration was noted . Pupils were unequal , dilated and non reactive to light . No heart sounds and breath sounds were appreciated on auscultation.     Asystole and flat line noted on telemetry.  Pt was pronounced dead at 12:40 on 12/14/19

## 2019-12-14 NOTE — PLAN OF CARE
Pt AAO x4.  VSS.  Pt remained afebrile throughout this shift.   All meds administered per order.   Pt remained free of falls this shift.   Pt wore BiPap off and on during night.PRN ativan given per family request to try and relax pt to keep from pulling mask.  After being woken up from deep sleep, pt has been restless and fidgety.    Plan of care reviewed. Patient verbalizes understanding.   Pt moving/turning independently.   Patient ST with IVCD on monitor.   Bed low, side rails up x 2, wheels locked, call light in reach.   Patient instructed to call for assistance.   Hourly rounding completed.   Will continue to monitor

## 2019-12-14 NOTE — ASSESSMENT & PLAN NOTE
Acute kidney injury most likely due to severe end-stage congestive heart failure with very poor cardiac output and hypotensive shock is also cardiogenic shock.  Lactic acid greater than 10.  Very poor prognosis discussed in detail with all the teams including Dex Chu,ANP, ICU team, nursing staff, patient's wife, patient's daughter and son-in-law.  No hemodialysis is possible at this time.  CRRT is not opted for due to poor prognosis overall and futility of care.  Patient's family completely agree with.  Recommend comfort measures only.  Critical care time spent is about 35 min total time spent is about 1 hr and 25 min out of which 35 min were critical care time which was exclusively in deciding the therapeutic options and if finally hospice care and palliative care and comfort measures were recommended and agreed upon.

## 2019-12-14 NOTE — PROGRESS NOTES
[facility]   Critical Care Medicine  Progress Note    Patient Name: Raleigh Walsh  MRN: [id number]  Admission Date: 12/5/2019  Hospital Length of Stay: 9 days  Code Status: DNR  Attending Provider: Gilmar Florence MD  Primary Care Provider: Akhil Smith MD   Principal Problem: Cardiogenic shock    Subjective:     HPI:  74 year old male with PMH including CAD s/p MI with stent to LAD; HTN; HLD; depression; insomnia; reports YESENIA diagnosis and has CPAP but has not routinely used  Presented to [facility] ED with CP and EKG showed STEMI  tnkase given at 2046 and he was urgently transferred here for evaluation by cardiology  Taken for [facility] after arrival at this facility  PCI of LAD stent with balloon + new LAD stent perfromed    Hospital/ICU Course:  Admitted to ICU overnight post C  Intermittent complaints of chest discomfort along with occasional reperfusion ectopy on cardiac monitor  Intermittent nausea/vomiting throughout today  12/7 - tachycardia, anxiety reported overnight; this am tachycardia, chest pressure, SOB with transition off nocturnal CPAP to NC; troponin remains > 50k  12/8 - less pronounced dyspnea and tachycardia today; Dr Andres reports no decline on repeat echo last evening; afebrile; diuresed 1.5L  12/9 - overnight ST converted to atrial flutter with RVR, amiodarone and heparin infusions initiated; BP remains marginal; intermittent dyspnea with some improvement  12/10 - SR on monitor with amiodarone and heparin infusions; continued intermittent SOB, chest tightness; 12 Lead EKG reveals no changes; BP remains soft, lopressor held overnight  12/11 - overnight intermittent atrial fib with RVR did not require intervention, remains on heparin infusion; one episode of mild chest pain, self resolved  12/14 - Patient had transfer orders for Tele placed 12/11.  Continued to have abd discomfort and creatine increased to 1.7 yesterday and Lasix held and patient denied SOB or CP per notes.   Until yesterday afternoon developed SOB and placed on BiPAP.  Early this AM decreased LOC, increased SOB, WBC up with 29% bandemia, creatine increased to 3.6 w/ oliguria and K+ 6.1.  LFTs increased and patient appears jaundice and Ammonia 98.  LA 10 and glucose in 40s.  CXR with mod to large bilat pleural effusions.  Transferred to ICU on BiPAP and somnolent.  Assessed patient and reviewed chart and labs.     Review of Systems   Unable to perform ROS: Mental status change         Objective:     Vital Signs (Most Recent):  Temp: 97.1 °F (36.2 °C) (12/14/19 0730)  Pulse: (!) 125 (12/14/19 0930)  Resp: (!) 29 (12/14/19 0930)  BP: (!) 114/58 (12/14/19 0904)  SpO2: (!) 90 % (12/14/19 0930) Vital Signs (24h Range):  Temp:  [96.7 °F (35.9 °C)-98.6 °F (37 °C)] 97.1 °F (36.2 °C)  Pulse:  [] 125  Resp:  [20-33] 29  SpO2:  [88 %-100 %] 90 %  BP: ()/(18-70) 114/58     Weight: 67.8 kg (149 lb 7.6 oz)  Body mass index is 24.87 kg/m².      Intake/Output Summary (Last 24 hours) at 12/14/2019 1002  Last data filed at 12/14/2019 0945  Gross per 24 hour   Intake 914.65 ml   Output 100 ml   Net 814.65 ml       Physical Exam   Constitutional: He appears well-developed and well-nourished. He appears lethargic. He appears toxic. He has a sickly appearance. He appears ill. He appears distressed. He is not intubated. Face mask in place.   HENT:   Head: Normocephalic and atraumatic.   Mouth/Throat: Mucous membranes are dry (on BiPAP).   Eyes: Pupils are equal, round, and reactive to light. Lids are normal.   Jaundice sclera   Neck: Trachea normal. Neck supple. Carotid bruit is not present.   Cardiovascular: Regular rhythm. Tachycardia present.   Murmur heard.   Systolic murmur is present.  Pulses:       Radial pulses are 1+ on the right side, and 1+ on the left side.        Dorsalis pedis pulses are 0 on the right side, and 0 on the left side.   Pulmonary/Chest: Accessory muscle usage present. Tachypnea noted. He is not  intubated. He is in respiratory distress. He has decreased breath sounds in the right lower field and the left lower field.   Abdominal: He exhibits distension. Bowel sounds are absent. There is generalized tenderness.   Genitourinary: Penis normal.   Genitourinary Comments: New in place   Musculoskeletal: Normal range of motion.        Right foot: There is no deformity.        Left foot: There is no deformity.   Mild edema   Lymphadenopathy:     He has no cervical adenopathy.   Neurological: He appears lethargic.   Mumbled speech and withdraws to pain but does not follow commands   Skin: Skin is warm, dry and intact. Capillary refill takes 2 to 3 seconds. No rash noted. No cyanosis.   Jaundice       Vents:  Oxygen Concentration (%): 50 (12/14/19 0739)    Lines/Drains/Airways     Peripherally Inserted Central Catheter Line                 PICC Double Lumen 12/09/19 0820 right brachial 5 days          Central Venous Catheter Line                 Trialysis (Dialysis) Catheter 12/14/19 0845 right femoral less than 1 day          Drain                 Urethral Catheter 12/14/19 0645 Latex 16 Fr. less than 1 day          Arterial Line                 Arterial Line 12/14/19 0830 Right Femoral less than 1 day          Peripheral Intravenous Line                 Peripheral IV - Single Lumen 12/05/19 2032 18 G Right Antecubital 8 days                Significant Labs:    CBC/Anemia Profile:  Recent Labs   Lab 12/14/19  0426 12/14/19  0625 12/14/19  0739 12/14/19  0845   WBC 15.30* 17.38*  --   --    HGB 12.8* 12.7*  --   --    HCT 39.1* 38.9* 33* 19*    311  --   --    MCV 95 97  --   --    RDW 12.6 12.7  --   --         Chemistries:  Recent Labs   Lab 12/13/19  0420 12/14/19  0426 12/14/19  0625   * 133* 131*   K 4.7 5.6* 6.1*   CL 92* 92* 91*   CO2 25 20* 17*   BUN 66* 104* 110*   CREATININE 1.7* 3.6* 3.9*   CALCIUM 8.9 8.2* 8.4*   ALBUMIN  --  2.5* 2.5*   PROT  --  5.6* 5.5*   BILITOT  --  1.6* 1.9*    ALKPHOS  --  98 97   ALT  --  1,047* 1,059*   AST  --  1,323* 1,281*   MG 2.7* 2.9*  --    PHOS 4.7* 5.8*  --        ABGs:   Recent Labs   Lab 12/14/19  0845   PH 7.287*   PCO2 23.8*   HCO3 11.3*   POCSATURATED 81*   BE -15     Coagulation:   Recent Labs   Lab 12/14/19  0734   INR 1.9*     Lactic Acid:   Recent Labs   Lab 12/14/19  0625   LACTATE 10.0*     POCT Glucose:   Recent Labs   Lab 12/14/19  0739 12/14/19  0805 12/14/19  0835   POCTGLUCOSE 72 34* 117*     Troponin:   Recent Labs   Lab 12/13/19  1147   TROPONINI 10.310*     All pertinent labs within the past 24 hours have been reviewed.    Significant Imaging:  CXR: I have reviewed all pertinent results/findings within the past 24 hours and my personal findings are:  mod to large bilat pleural effusions      ABG  Recent Labs   Lab 12/14/19  0845   PH 7.287*   PO2 50*   PCO2 23.8*   HCO3 11.3*   BE -15     Assessment/Plan:     Problem   Cardiogenic Shock   Cardiorenal Syndrome, Stage 1-4 Or Unspecified Chronic Kidney Disease, With Heart Failure   Acute Hypoxemic Respiratory Failure   Tanner (Acute Kidney Injury)   Hepatic Encephalopathy   Acute Liver Failure With Hepatic Coma   Acute Systolic Congestive Heart Failure, Nyha Class 3   Metabolic Acidosis   Hypoglycemia   Stemi (St Elevation Myocardial Infarction)   Hyperkalemia   Pleural Effusion, Bilateral   Dileep (Obstructive Sleep Apnea)     Advance Care Planning   Patient arrived to ICU in multiorgan system failure.  Emergent VasCath placed and Arterial line to right groin.  Patient received Bicarb, CaCl and Dextrose IVP.  Met with spouse and Daughters X 2 at bedside.  Family informed of patient's current critical state and need for aggressive support vs option of comfort care.  Family states patient does not want intubation and mech vent support.  Renal consulted and reviewed chart and had second conference with family and also reviewed repeat bedside Echo.  Severe WMA per Dr. Lebron who does not recommend  RRT/CRRT due to cardiorenal syndrome and hypoperfusion secondary to Severe Cardiogenic Shock.  Family elects DNR and comfort care with no further aggressive Rx.  Will provide comfort care and sign off.        Critical Care Time: 100 minutes  Critical secondary to Patient has a condition that poses threat to life and bodily function: Acute Myocardial Infarction, Severe Respiratory Distress and Acute Renal Failure  Patient has an abrupt change in neurologic status: Hepatic Encephalopathy  Patient is currently on drug therapy requiring intensive monitoring for toxicity: Levophed infusion      Critical care was time spent personally by me on the following activities: development of treatment plan with patient or surrogate and bedside caregivers, discussions with consultants, evaluation of patient's response to treatment, examination of patient, ordering and performing treatments and interventions, ordering and review of laboratory studies, ordering and review of radiographic studies, pulse oximetry, re-evaluation of patient's condition. This critical care time did not overlap with that of any other provider or involve time for any procedures.     Enmanuel Rodriguez NP  Critical Care Medicine  [facility]

## 2019-12-14 NOTE — NURSING
Pt with elevated liver enzymes, hypoglycemia, and no urine output. BC, CBC, CMP, LA ordered and drawn. New cath inserted by LAVON Anderson. 30 ml of clear urine sent to lab for UA.  Pt transferred to ICU Bed 4 via floor bed with BiPap in use. Report called to LAVON Pardo

## 2019-12-14 NOTE — PROCEDURES
"Raleigh Walsh is a 74 y.o. male patient.    Temp: 97.1 °F (36.2 °C) (12/14/19 0730)  Pulse: (!) 125 (12/14/19 0930)  Resp: (!) 29 (12/14/19 0930)  BP: (!) 114/58 (12/14/19 0904)  SpO2: (!) 90 % (12/14/19 0930)  Weight: 67.8 kg (149 lb 7.6 oz) (12/14/19 0659)  Height: 5' 5" (165.1 cm) (12/13/19 1100)       Central Line - Trialysis VasCat  Date/Time: 12/14/2019 8:36 AM  Performed by: Enmanuel Rodriguez NP  Consent Done: Yes  Time out: Immediately prior to procedure a "time out" was called to verify the correct patient, procedure, equipment, support staff and site/side marked as required.  Indications: hemodialysis  Anesthesia: local infiltration    Anesthesia:  Local Anesthetic: lidocaine 1% without epinephrine  Anesthetic total: 2 mL  Preparation: skin prepped with ChloraPrep  Skin prep agent dried: skin prep agent completely dried prior to procedure  Sterile barriers: all five maximum sterile barriers used - cap, mask, sterile gown, sterile gloves, and large sterile sheet  Hand hygiene: hand hygiene performed prior to central venous catheter insertion  Location details: right femoral  Site selection rationale: emergent and elevated INR  Catheter type: trialysis  Catheter size: 12 Fr  Catheter Length: 16cm    Ultrasound guidance: yes  Vessel Caliber: medium, patent, compressibility normal  Vascular Doppler: not done  Needle advanced into vessel with real time Ultrasound guidance.  Guidewire confirmed in vessel.  Sterile sheath used.  Manometry: No   Number of attempts: 1  Assessment: successful placement  Complications: none  Estimated blood loss (mL): 2  Specimens: No  Implants: No  Post-procedure: line sutured,  chlorhexidine patch,  sterile dressing applied and blood return through all ports  Complications: No          Enmanuel Rodriguez  12/14/2019  "

## 2019-12-14 NOTE — CONSULTS
[facility] -   Nephrology  Consult Note        Patient Name: Raleigh Walsh  MRN: [id number]  Admission Date: 12/5/2019  Hospital Length of Stay: 9 days  Attending Provider: Gilmar Florence MD   Primary Care Physician: Akhil Smith MD  Principal Problem:Cardiogenic shock    Consults  Subjective:     HPI: Patient is a 74-year-old male with history of coronary artery disease status post angioplasty in the past has been hospitalized for acute ST-elevation MI status post cardiac catheterization with worsening congestive heart failure and cardiac events as outlined in the hospital course.  Patient has acute kidney injury due to cardiorenal syndrome.  Hospitalized and then transferred to intensive care unit.  On BiPAP for severe shortness of breath.  Multiorgan failure evaluated in the ICU at the bedside.  Multiple discussions with ICU team as well as the patient's family which includes patient's wife, daughter and son-in-law.  Patient made DNR and comfort measures only with critical situation and poor prognosis made aware for all parties involved.  No dialysis options have been discussed and agreed upon at this time.    Past Medical History:   Diagnosis Date    Angina pectoris     Depression     Hyperlipidemia     Hypertension     Insomnia     MI (myocardial infarction) 2009    YESENIA on CPAP        Past Surgical History:   Procedure Laterality Date    CERVICAL FUSION      CORONARY ANGIOPLASTY WITH STENT PLACEMENT  2009       Review of patient's allergies indicates:  No Known Allergies  Current Facility-Administered Medications   Medication Frequency    lorazepam (ATIVAN) injection 2 mg Q2H PRN    morphine injection 4 mg Q30 Min PRN    norepinephrine 4 mg in dextrose 5% 250 mL infusion (premix) (titrating) Continuous    ondansetron injection 4 mg Q6H PRN     Family History     None        Tobacco Use    Smoking status: Former Smoker    Smokeless tobacco: Never Used   Substance and Sexual  Activity    Alcohol use: Never     Frequency: Never    Drug use: Never    Sexual activity: Not on file     Review of Systems   Unable to perform ROS: Acuity of condition   Respiratory: Positive for shortness of breath.    All other systems reviewed and are negative.    Objective:     Vital Signs (Most Recent):  Temp: 97.1 °F (36.2 °C) (12/14/19 0730)  Pulse: (!) 114 (12/14/19 1100)  Resp: (!) 26 (12/14/19 1100)  BP: (!) 71/45 (12/14/19 1000)  SpO2: (!) 88 % (12/14/19 1100)  O2 Device (Oxygen Therapy): BiPAP (12/14/19 1105) Vital Signs (24h Range):  Temp:  [96.7 °F (35.9 °C)-98.6 °F (37 °C)] 97.1 °F (36.2 °C)  Pulse:  [] 114  Resp:  [20-32] 26  SpO2:  [85 %-100 %] 88 %  BP: ()/(18-70) 71/45     Weight: 67.8 kg (149 lb 7.6 oz) (12/14/19 0659)  Body mass index is 24.87 kg/m².  Body surface area is 1.76 meters squared.    I/O last 3 completed shifts:  In: 740 [P.O.:740]  Out: 325 [Urine:325]    Physical Exam   Constitutional: He appears well-developed and well-nourished. He appears lethargic. He appears toxic. He has a sickly appearance. He appears ill. He appears distressed. He is not intubated. Face mask in place.   HENT:   Head: Normocephalic and atraumatic.   Mouth/Throat: Mucous membranes are dry (on BiPAP).   Eyes: Pupils are equal, round, and reactive to light. Lids are normal.   Jaundice sclera   Neck: Trachea normal. Neck supple. Carotid bruit is not present.   Cardiovascular: Regular rhythm. Tachycardia present.   Murmur heard.   Systolic murmur is present.  Pulses:       Radial pulses are 1+ on the right side, and 1+ on the left side.        Dorsalis pedis pulses are 0 on the right side, and 0 on the left side.   Pulmonary/Chest: Accessory muscle usage present. Tachypnea noted. He is not intubated. He is in respiratory distress. He has decreased breath sounds in the right lower field and the left lower field.   Abdominal: He exhibits distension. Bowel sounds are absent. There is generalized  tenderness.   Genitourinary: Penis normal.   Genitourinary Comments: New in place   Musculoskeletal: Normal range of motion.        Right foot: There is no deformity.        Left foot: There is no deformity.   Mild edema   Lymphadenopathy:     He has no cervical adenopathy.   Neurological: He appears lethargic.   Mumbled speech and withdraws to pain but does not follow commands   Skin: Skin is warm, dry and intact. Capillary refill takes 2 to 3 seconds. No rash noted. No cyanosis.   Jaundice       Significant Labs:  CBC:   Recent Labs   Lab 12/14/19  0625  12/14/19  0845   WBC 17.38*  --   --    RBC 4.00*  --   --    HGB 12.7*  --   --    HCT 38.9*   < > 19*     --   --    MCV 97  --   --    MCH 31.8*  --   --    MCHC 32.6  --   --     < > = values in this interval not displayed.     CMP:   Recent Labs   Lab 12/14/19  0625   GLU 66*   CALCIUM 8.4*   ALBUMIN 2.5*   PROT 5.5*   *   K 6.1*   CO2 17*   CL 91*   *   CREATININE 3.9*   ALKPHOS 97   ALT 1,059*   AST 1,281*   BILITOT 1.9*     All labs within the past 24 hours have been reviewed.    Significant Imaging:  Labs: Reviewed  ECG: Reviewed  X-Ray: Reviewed    Assessment/Plan:     Cardiorenal syndrome, stage 1-4 or unspecified chronic kidney disease, with heart failure  Bedside ago evaluated along with ICU team.  LV dysfunction is very severe.  Patient on multiple pressors and not doing well with very poor urine output.  Very poor prognosis.  Comfort measures only at this time.    PENNY (acute kidney injury)  Acute kidney injury most likely due to severe end-stage congestive heart failure with very poor cardiac output and hypotensive shock is also cardiogenic shock.  Lactic acid greater than 10.  Very poor prognosis discussed in detail with all the teams including NADIR Sanford, ICU team, nursing staff, patient's wife, patient's daughter and son-in-law.  No hemodialysis is possible at this time.  CRRT is not opted for due to poor prognosis  overall and futility of care.  Patient's family completely agree with.  Recommend comfort measures only.  Critical care time spent is about 35 min total time spent is about 1 hr and 25 min out of which 35 min were critical care time which was exclusively in deciding the therapeutic options and if finally hospice care and palliative care and comfort measures were recommended and agreed upon.        Thank you for your consult.     Kathleen Lebron MD   Nephrology  [facility]

## 2019-12-14 NOTE — ASSESSMENT & PLAN NOTE
Bedside ago evaluated along with ICU team.  LV dysfunction is very severe.  Patient on multiple pressors and not doing well with very poor urine output.  Very poor prognosis.  Comfort measures only at this time.

## 2019-12-15 NOTE — PLAN OF CARE
12/15/19 1428   Final Note   Assessment Type Final Discharge Note   Anticipated Discharge Disposition    Right Care Referral Info   Post Acute Recommendation Other   Facility Name

## 2019-12-16 NOTE — PHYSICIAN QUERY
PT Name: Raleigh Walsh  MR #: [id number]    Physician Query Form - Atrial Fibrillation Specificity     CDS/: Amie Philippe               Contact information: [email or web address]       This form is a permanent document in the medical record.     Query Date: December 16, 2019    By submitting this query, we are merely seeking further clarification of documentation. Please utilize your independent clinical judgment when addressing the question(s) below.    The medical record contains the following:   Indicators     Supporting Clinical Findings Location in Medical Record   x Atrial Fibrillation Still c/o some SOB . Intermittent A.fib overnight and  had an episode of chest discomfort last night after eating ice cream  per nursing staff. Discharge summary    x EKG results Normal sinus rhythm  Low voltage QRS  Left anterior fascicular block  Possible Anterolateral infarct (cited on or before 05-DEC-2019)  Abnormal ECG EKG 12/10    x Medication amiodarone tablet 200 mg   Dose: 200 mg  Freq: 2 times daily Route: Oral  Start: 12/10/19 1045     metoprolol injection 2.5 mg   Dose: 2.5 mg  Freq: Once Route: IV  Start: 12/07/19 0828 End: 12/07/19 0830     amiodarone 360 mg/200 mL (1.8 mg/mL) infusion   Rate: 16.7 mL/hr Dose: 0.5 mg/min  Freq: Continuous Route: IV  Start: 12/09/19 1145 End: 12/10/19 1033 MAR               MAR               MAR     Treatment      Other         Provider, please further specify the Atrial Fibrillation diagnosis.    [  ] Chronic   [ x ] Paroxysmal   [  ] Permanent   [  ] Persistent   [  ] Other (please specify):   [  ] Clinically Undetermined       Please document in your progress notes daily for the duration of treatment until resolved, and include in your discharge summary.

## 2019-12-17 LAB
BACTERIA BLD CULT: ABNORMAL

## 2020-03-03 NOTE — PROGRESS NOTES
New removed at this time, pt instructed on the need to urinate on own before discharge or within 6 hours. Pt verbalized understanding.  Collection container available.  Will continue to monitor.   unknown
